# Patient Record
Sex: MALE | Race: WHITE | NOT HISPANIC OR LATINO | Employment: OTHER | URBAN - METROPOLITAN AREA
[De-identification: names, ages, dates, MRNs, and addresses within clinical notes are randomized per-mention and may not be internally consistent; named-entity substitution may affect disease eponyms.]

---

## 2023-02-15 PROBLEM — N17.9 AKI (ACUTE KIDNEY INJURY) (CMS-HCC): Status: ACTIVE | Noted: 2023-02-15

## 2023-02-15 PROBLEM — M25.569 KNEE PAIN: Status: ACTIVE | Noted: 2023-02-15

## 2023-02-15 PROBLEM — E66.01 OBESITY, MORBID, BMI 40.0-49.9 (MULTI): Status: ACTIVE | Noted: 2023-02-15

## 2023-02-15 PROBLEM — G62.9 NEUROPATHY: Status: ACTIVE | Noted: 2023-02-15

## 2023-02-15 PROBLEM — R06.02 SOB (SHORTNESS OF BREATH) ON EXERTION: Status: ACTIVE | Noted: 2023-02-15

## 2023-02-15 PROBLEM — R09.82 POSTNASAL DRIP: Status: ACTIVE | Noted: 2023-02-15

## 2023-02-15 PROBLEM — S96.911A STRAIN OF FOOT, RIGHT: Status: ACTIVE | Noted: 2023-02-15

## 2023-02-15 PROBLEM — N40.0 BENIGN PROSTATIC HYPERPLASIA: Status: ACTIVE | Noted: 2023-02-15

## 2023-02-15 PROBLEM — G47.33 OBSTRUCTIVE SLEEP APNEA SYNDROME: Status: ACTIVE | Noted: 2023-02-15

## 2023-02-15 PROBLEM — G43.909 HEADACHE, MIGRAINE: Status: ACTIVE | Noted: 2023-02-15

## 2023-02-15 PROBLEM — M47.819 ARTHRITIS OF SPINE: Status: ACTIVE | Noted: 2023-02-15

## 2023-02-15 PROBLEM — N18.30 CKD (CHRONIC KIDNEY DISEASE), STAGE III (MULTI): Status: ACTIVE | Noted: 2023-02-15

## 2023-02-15 PROBLEM — R53.83 FATIGUE: Status: ACTIVE | Noted: 2023-02-15

## 2023-02-15 PROBLEM — I25.10 CAD (CORONARY ARTERY DISEASE): Status: ACTIVE | Noted: 2023-02-15

## 2023-02-15 PROBLEM — S99.921A FOOT INJURY, RIGHT, INITIAL ENCOUNTER: Status: ACTIVE | Noted: 2023-02-15

## 2023-02-15 PROBLEM — R20.2 NUMBNESS AND TINGLING IN BOTH HANDS: Status: ACTIVE | Noted: 2023-02-15

## 2023-02-15 PROBLEM — E55.9 VITAMIN D DEFICIENCY: Status: ACTIVE | Noted: 2023-02-15

## 2023-02-15 PROBLEM — E78.5 DYSLIPIDEMIA: Status: ACTIVE | Noted: 2023-02-15

## 2023-02-15 PROBLEM — R60.0 LEG EDEMA: Status: ACTIVE | Noted: 2023-02-15

## 2023-02-15 PROBLEM — E11.9 TYPE 2 DIABETES MELLITUS (MULTI): Status: ACTIVE | Noted: 2023-02-15

## 2023-02-15 PROBLEM — R53.83 MALAISE AND FATIGUE: Status: ACTIVE | Noted: 2023-02-15

## 2023-02-15 PROBLEM — M48.00 SPINAL STENOSIS: Status: ACTIVE | Noted: 2023-02-15

## 2023-02-15 PROBLEM — U07.1 COVID-19: Status: ACTIVE | Noted: 2023-02-15

## 2023-02-15 PROBLEM — R53.81 MALAISE AND FATIGUE: Status: ACTIVE | Noted: 2023-02-15

## 2023-02-15 PROBLEM — M79.89 SWELLING OF LOWER EXTREMITY: Status: ACTIVE | Noted: 2023-02-15

## 2023-02-15 PROBLEM — R07.89 CHEST PAIN, ATYPICAL: Status: ACTIVE | Noted: 2023-02-15

## 2023-02-15 PROBLEM — I10 ESSENTIAL HYPERTENSION: Status: ACTIVE | Noted: 2023-02-15

## 2023-02-15 PROBLEM — R20.0 HAND NUMBNESS: Status: ACTIVE | Noted: 2023-02-15

## 2023-02-15 PROBLEM — R20.0 NUMBNESS OF BOTH LOWER EXTREMITIES: Status: ACTIVE | Noted: 2023-02-15

## 2023-02-15 PROBLEM — R06.83 SNORES: Status: ACTIVE | Noted: 2023-02-15

## 2023-02-15 PROBLEM — K21.9 GERD (GASTROESOPHAGEAL REFLUX DISEASE): Status: ACTIVE | Noted: 2023-02-15

## 2023-02-15 PROBLEM — E11.40 DIABETIC NEUROPATHY (MULTI): Status: ACTIVE | Noted: 2023-02-15

## 2023-02-15 PROBLEM — I25.10 CORONARY ARTERY CALCIFICATION SEEN ON CAT SCAN: Status: ACTIVE | Noted: 2023-02-15

## 2023-02-15 PROBLEM — M17.10 KNEE ARTHROPATHY: Status: ACTIVE | Noted: 2023-02-15

## 2023-02-15 PROBLEM — R20.0 NUMBNESS AND TINGLING IN BOTH HANDS: Status: ACTIVE | Noted: 2023-02-15

## 2023-02-15 PROBLEM — M54.50 LOW BACK PAIN: Status: ACTIVE | Noted: 2023-02-15

## 2023-02-15 PROBLEM — E03.9 HYPOTHYROIDISM: Status: ACTIVE | Noted: 2023-02-15

## 2023-02-15 PROBLEM — R32 URINE INCONTINENCE: Status: ACTIVE | Noted: 2023-02-15

## 2023-02-15 PROBLEM — R91.8 MULTIPLE LUNG NODULES: Status: ACTIVE | Noted: 2023-02-15

## 2023-02-15 PROBLEM — R20.2 PARESTHESIA: Status: ACTIVE | Noted: 2023-02-15

## 2023-02-15 RX ORDER — ASCORBIC ACID 500 MG
500 TABLET ORAL DAILY
COMMUNITY
Start: 2022-02-23 | End: 2023-10-19 | Stop reason: ALTCHOICE

## 2023-02-15 RX ORDER — LANOLIN ALCOHOL/MO/W.PET/CERES
CREAM (GRAM) TOPICAL
COMMUNITY
End: 2023-10-19 | Stop reason: ALTCHOICE

## 2023-02-15 RX ORDER — PIOGLITAZONEHYDROCHLORIDE 45 MG/1
1 TABLET ORAL DAILY
COMMUNITY
Start: 2013-01-18 | End: 2023-03-09 | Stop reason: SDUPTHER

## 2023-02-15 RX ORDER — ASPIRIN 81 MG/1
1 TABLET ORAL DAILY
COMMUNITY

## 2023-02-15 RX ORDER — ATORVASTATIN CALCIUM 40 MG/1
1 TABLET, FILM COATED ORAL DAILY
COMMUNITY
Start: 2020-12-11 | End: 2023-10-02 | Stop reason: SDUPTHER

## 2023-02-15 RX ORDER — SAXAGLIPTIN AND METFORMIN HYDROCHLORIDE 5; 1000 MG/1; MG/1
1 TABLET, FILM COATED, EXTENDED RELEASE ORAL DAILY
COMMUNITY
Start: 2015-09-14 | End: 2023-03-30 | Stop reason: SDUPTHER

## 2023-02-15 RX ORDER — BLOOD SUGAR DIAGNOSTIC
STRIP MISCELLANEOUS 2 TIMES DAILY
COMMUNITY
Start: 2015-12-21 | End: 2023-09-12

## 2023-02-15 RX ORDER — LORATADINE 10 MG/1
1 TABLET ORAL DAILY
COMMUNITY
Start: 2021-12-31

## 2023-02-15 RX ORDER — LEVOTHYROXINE SODIUM 25 UG/1
1 TABLET ORAL DAILY
COMMUNITY
Start: 2012-10-25 | End: 2023-05-31 | Stop reason: SDUPTHER

## 2023-02-15 RX ORDER — HYDROCHLOROTHIAZIDE 25 MG/1
1 TABLET ORAL DAILY
COMMUNITY
Start: 2019-07-09 | End: 2024-04-22

## 2023-02-15 RX ORDER — TAMSULOSIN HYDROCHLORIDE 0.4 MG/1
1 CAPSULE ORAL DAILY
COMMUNITY
Start: 2015-09-10 | End: 2023-10-19 | Stop reason: ALTCHOICE

## 2023-02-15 RX ORDER — ESOMEPRAZOLE MAGNESIUM 20 MG/1
1 TABLET, DELAYED RELEASE ORAL DAILY
COMMUNITY
End: 2023-12-13 | Stop reason: WASHOUT

## 2023-02-15 RX ORDER — FLUTICASONE PROPIONATE 50 MCG
1 SPRAY, SUSPENSION (ML) NASAL DAILY
COMMUNITY
Start: 2017-06-20 | End: 2024-01-10 | Stop reason: SDUPTHER

## 2023-02-15 RX ORDER — TOPIRAMATE 100 MG/1
1 TABLET, FILM COATED ORAL 2 TIMES DAILY
COMMUNITY
Start: 2016-01-26 | End: 2023-12-01

## 2023-02-15 RX ORDER — GABAPENTIN 300 MG/1
300 CAPSULE ORAL
COMMUNITY
Start: 2016-07-28 | End: 2023-04-17 | Stop reason: SDUPTHER

## 2023-02-15 RX ORDER — OLMESARTAN MEDOXOMIL 40 MG/1
20 TABLET ORAL DAILY
COMMUNITY
Start: 2015-11-20 | End: 2023-03-30 | Stop reason: SDUPTHER

## 2023-02-15 RX ORDER — GLIMEPIRIDE 2 MG/1
1 TABLET ORAL
COMMUNITY
Start: 2012-10-08

## 2023-03-09 DIAGNOSIS — E11.9 TYPE 2 DIABETES MELLITUS WITHOUT COMPLICATION, WITHOUT LONG-TERM CURRENT USE OF INSULIN (MULTI): Primary | ICD-10-CM

## 2023-03-13 ENCOUNTER — OFFICE VISIT (OUTPATIENT)
Dept: PRIMARY CARE | Facility: CLINIC | Age: 73
End: 2023-03-13
Payer: MEDICARE

## 2023-03-13 VITALS
HEIGHT: 68 IN | RESPIRATION RATE: 20 BRPM | OXYGEN SATURATION: 96 % | TEMPERATURE: 96.5 F | BODY MASS INDEX: 42.44 KG/M2 | DIASTOLIC BLOOD PRESSURE: 62 MMHG | WEIGHT: 280 LBS | SYSTOLIC BLOOD PRESSURE: 136 MMHG | HEART RATE: 75 BPM

## 2023-03-13 DIAGNOSIS — S49.91XA: ICD-10-CM

## 2023-03-13 DIAGNOSIS — M62.10: Primary | ICD-10-CM

## 2023-03-13 PROCEDURE — 3078F DIAST BP <80 MM HG: CPT | Performed by: NURSE PRACTITIONER

## 2023-03-13 PROCEDURE — 1159F MED LIST DOCD IN RCRD: CPT | Performed by: NURSE PRACTITIONER

## 2023-03-13 PROCEDURE — 4010F ACE/ARB THERAPY RXD/TAKEN: CPT | Performed by: NURSE PRACTITIONER

## 2023-03-13 PROCEDURE — 99214 OFFICE O/P EST MOD 30 MIN: CPT | Performed by: NURSE PRACTITIONER

## 2023-03-13 PROCEDURE — 1036F TOBACCO NON-USER: CPT | Performed by: NURSE PRACTITIONER

## 2023-03-13 PROCEDURE — 3075F SYST BP GE 130 - 139MM HG: CPT | Performed by: NURSE PRACTITIONER

## 2023-03-13 RX ORDER — LANCETS 30 GAUGE
EACH MISCELLANEOUS
COMMUNITY
Start: 2023-01-09

## 2023-03-13 RX ORDER — LANCETS 33 GAUGE
EACH MISCELLANEOUS
COMMUNITY
Start: 2023-01-13 | End: 2023-07-28

## 2023-03-13 RX ORDER — METOPROLOL SUCCINATE 25 MG/1
1 TABLET, EXTENDED RELEASE ORAL DAILY
COMMUNITY
Start: 2023-03-08 | End: 2023-12-13 | Stop reason: SDUPTHER

## 2023-03-13 RX ORDER — MULTIVITAMIN/IRON/FOLIC ACID 18MG-0.4MG
1 TABLET ORAL DAILY
COMMUNITY
Start: 2023-03-08

## 2023-03-13 RX ORDER — RIZATRIPTAN BENZOATE 10 MG/1
TABLET ORAL
COMMUNITY
End: 2023-10-19 | Stop reason: ALTCHOICE

## 2023-03-13 ASSESSMENT — PAIN SCALES - GENERAL: PAINLEVEL: 4

## 2023-03-13 ASSESSMENT — ENCOUNTER SYMPTOMS: JOINT SWELLING: 0

## 2023-03-13 NOTE — PATIENT INSTRUCTIONS
You have  been referred to Orthopaedics for further evaluation and treatment of your ruptured muscle  Rest, Ice pack if needed.  Tylenol if needed for pain.

## 2023-03-13 NOTE — PROGRESS NOTES
"Subjective   Patient ID: Jeffry Lynn is a 72 y.o. male who presents for pulled muscle.    HPI     Patient presents to clinic for evaluation of pulled muscle to right upper arm x1 week.  Patient states he was lifting luggage approximately 50 pounds when he felt a pop in his right upper arm.  He states that blood  accumulated under the arm and he continues to have a point of tenderness. He is able to move his arm without difficulty.  Today he denies pain in the arm and swelling in his arm.      Review of Systems   Musculoskeletal:  Negative for joint swelling.        He denies numbness tingling pain coolness or inability to move the arm.   All other systems reviewed and are negative.      Objective   /62   Pulse 75   Temp 35.8 °C (96.5 °F) (Temporal)   Resp 20   Ht 1.727 m (5' 8\")   Wt 127 kg (280 lb)   SpO2 96%   BMI 42.57 kg/m²     Physical Exam  Constitutional:       Appearance: Normal appearance.   Cardiovascular:      Rate and Rhythm: Normal rate and regular rhythm.   Pulmonary:      Effort: Pulmonary effort is normal.      Breath sounds: Normal breath sounds.   Musculoskeletal:      Comments: Soft tissue bleeding noted to right biceps brachialis around to posterior aspect of right upper arm.  Right arm not warm to touch, no coolness noted, able to move right arm without difficulty, neurovascular intact.   Neurological:      Mental Status: He is alert and oriented to person, place, and time.         Assessment/Plan   Problem List Items Addressed This Visit    None  Visit Diagnoses       Rupture of muscle, nontraumatic    -  Primary    Relevant Orders    Referral to Orthopaedic Surgery-Appointment made for 3/14/2023    Soft tissue injury of right upper arm, initial encounter            Rest, Ice, Elevate.       Tylenol if needed.          "

## 2023-03-15 RX ORDER — PIOGLITAZONEHYDROCHLORIDE 45 MG/1
45 TABLET ORAL DAILY
Qty: 30 TABLET | Refills: 0 | Status: SHIPPED | OUTPATIENT
Start: 2023-03-15 | End: 2023-03-20 | Stop reason: SDUPTHER

## 2023-03-15 NOTE — TELEPHONE ENCOUNTER
Rx Refill Request Telephone Encounter    Name:  Jeffry Lynn  :  [id number]  Medication Name:  Pioglitazone  Dose : 45mg  Route : Oral  Frequency : 1 Daily      Specific Pharmacy location:  [facility]  Date of last appointment:  23  Date of next appointment:  23  Best number to reach patient:  [phone]

## 2023-03-20 RX ORDER — PIOGLITAZONEHYDROCHLORIDE 45 MG/1
45 TABLET ORAL DAILY
Qty: 30 TABLET | Refills: 0 | Status: SHIPPED | OUTPATIENT
Start: 2023-03-20 | End: 2023-03-28

## 2023-03-20 NOTE — TELEPHONE ENCOUNTER
Rx Refill Request Telephone Encounter    Name:  Jeffry Lynn  :  [id number]  Medication Name:  KOMBIGLYZE XR 5-1000MG ORAL TABLETS EXTENDED RELEASE 24HR    Specific Pharmacy location:  [facility] [place] [facility]    PATIENT STATES HE ONLY HAVE ABOUT 7 PILLS LEFT

## 2023-03-22 DIAGNOSIS — E11.9 TYPE 2 DIABETES MELLITUS WITHOUT COMPLICATION, WITHOUT LONG-TERM CURRENT USE OF INSULIN (MULTI): ICD-10-CM

## 2023-03-28 RX ORDER — PIOGLITAZONEHYDROCHLORIDE 45 MG/1
TABLET ORAL
Qty: 30 TABLET | Refills: 11 | Status: SHIPPED | OUTPATIENT
Start: 2023-03-28 | End: 2024-01-30

## 2023-04-05 ENCOUNTER — APPOINTMENT (OUTPATIENT)
Dept: PRIMARY CARE | Facility: CLINIC | Age: 73
End: 2023-04-05
Payer: MEDICARE

## 2023-04-17 ENCOUNTER — OFFICE VISIT (OUTPATIENT)
Dept: PRIMARY CARE | Facility: CLINIC | Age: 73
End: 2023-04-17
Payer: MEDICARE

## 2023-04-17 VITALS
HEIGHT: 68 IN | BODY MASS INDEX: 42.31 KG/M2 | SYSTOLIC BLOOD PRESSURE: 118 MMHG | TEMPERATURE: 97.2 F | WEIGHT: 279.2 LBS | HEART RATE: 70 BPM | DIASTOLIC BLOOD PRESSURE: 52 MMHG | OXYGEN SATURATION: 96 % | RESPIRATION RATE: 12 BRPM

## 2023-04-17 DIAGNOSIS — E11.9 TYPE 2 DIABETES MELLITUS WITHOUT COMPLICATION, WITHOUT LONG-TERM CURRENT USE OF INSULIN (MULTI): Primary | ICD-10-CM

## 2023-04-17 DIAGNOSIS — E11.49 OTHER DIABETIC NEUROLOGICAL COMPLICATION ASSOCIATED WITH TYPE 2 DIABETES MELLITUS (MULTI): ICD-10-CM

## 2023-04-17 DIAGNOSIS — M54.50 ACUTE BILATERAL LOW BACK PAIN WITHOUT SCIATICA: ICD-10-CM

## 2023-04-17 LAB
POC FINGERSTICK BLOOD GLUCOSE: 152 MG/DL (ref 70–100)
POC HEMOGLOBIN A1C: 6.9 % (ref 4.2–6.5)

## 2023-04-17 PROCEDURE — 82962 GLUCOSE BLOOD TEST: CPT | Performed by: NURSE PRACTITIONER

## 2023-04-17 PROCEDURE — 99214 OFFICE O/P EST MOD 30 MIN: CPT | Performed by: NURSE PRACTITIONER

## 2023-04-17 PROCEDURE — 1036F TOBACCO NON-USER: CPT | Performed by: NURSE PRACTITIONER

## 2023-04-17 PROCEDURE — 3078F DIAST BP <80 MM HG: CPT | Performed by: NURSE PRACTITIONER

## 2023-04-17 PROCEDURE — 83036 HEMOGLOBIN GLYCOSYLATED A1C: CPT | Performed by: NURSE PRACTITIONER

## 2023-04-17 PROCEDURE — 4010F ACE/ARB THERAPY RXD/TAKEN: CPT | Performed by: NURSE PRACTITIONER

## 2023-04-17 PROCEDURE — 3074F SYST BP LT 130 MM HG: CPT | Performed by: NURSE PRACTITIONER

## 2023-04-17 PROCEDURE — 1159F MED LIST DOCD IN RCRD: CPT | Performed by: NURSE PRACTITIONER

## 2023-04-17 RX ORDER — GABAPENTIN 300 MG/1
CAPSULE ORAL
Qty: 360 CAPSULE | Refills: 1 | Status: SHIPPED | OUTPATIENT
Start: 2023-04-17 | End: 2023-10-09

## 2023-04-17 ASSESSMENT — PATIENT HEALTH QUESTIONNAIRE - PHQ9
2. FEELING DOWN, DEPRESSED OR HOPELESS: NOT AT ALL
SUM OF ALL RESPONSES TO PHQ9 QUESTIONS 1 AND 2: 0
1. LITTLE INTEREST OR PLEASURE IN DOING THINGS: NOT AT ALL

## 2023-04-17 ASSESSMENT — PAIN SCALES - GENERAL: PAINLEVEL: 4

## 2023-04-17 NOTE — PROGRESS NOTES
"Subjective   Patient ID: Jeffry Lynn is a 72 y.o. male who presents for Follow-up (3 mo follow up).    HPI     Patient presents to clinic for diabetes check.  Patient presented to clinic with his meter has been checking his blood sugars at home meter average is 128.  Although patient states he has concerns if the meter is recording correctly.  He states he had a blood sugar of 66 this morning.  He states he had breakfast after reading of 66.    We tested his One Touch meter and compare it to ours his numbers stayed at 91 hours at 152.      Generally has been feeling well-states he did a lot of outside work around the house has a sore back otherwise patient without any other specific complaints or concerns.      Appetite normal bowel and bladder normal.      Has upcoming appointment May 5, 2023 with Nephrologist outside of  for further evaluation of  and Creatinine 1.56       ROS:  Low Back pain denies urinary symptoms all other systems negative.        Objective   /52 (BP Location: Left arm, Patient Position: Sitting, BP Cuff Size: Large adult)   Pulse 70   Temp 36.2 °C (97.2 °F) (Temporal)   Resp 12   Ht 1.727 m (5' 8\")   Wt 127 kg (279 lb 3.2 oz)   SpO2 96%   BMI 42.45 kg/m²     Physical Exam  Constitutional:       Appearance: Normal appearance. He is not ill-appearing.   HENT:      Mouth/Throat:      Mouth: Mucous membranes are moist.   Cardiovascular:      Rate and Rhythm: Normal rate and regular rhythm.   Pulmonary:      Effort: Pulmonary effort is normal.      Breath sounds: Normal breath sounds.   Musculoskeletal:         General: Normal range of motion.      Comments: Point tenderness low back both sides-Negative spinal tenderness   Skin:     General: Skin is warm and dry.   Neurological:      Mental Status: He is alert and oriented to person, place, and time.         Assessment/Plan   Problem List Items Addressed This Visit          Nervous    Diabetic neuropathy (CMS/HCC)       " Endocrine/Metabolic    Type 2 diabetes mellitus (CMS/HCC) - Primary    Relevant Medications    gabapentin (Neurontin) 300 mg capsule    Other Relevant Orders    POCT glycosylated hemoglobin (Hb A1C) manually resulted (Completed)    POCT Fingerstick Glucose manually resulted (Completed)  A1c 6.9         Other    Low back pain    Tylenol if needed    Warm or heat pack as needed.

## 2023-04-17 NOTE — PATIENT INSTRUCTIONS
Continue medications as prescribed.  Healthy diet and drink plenty of water.  A1c 6.9 today.  Your Glucose meter is reading inaccurate. Please return it back to pharmacy for replacement.   Follow-up in 2 months for Physical Exam  Call office any new concerns.

## 2023-05-31 DIAGNOSIS — E03.9 HYPOTHYROIDISM, UNSPECIFIED TYPE: Primary | ICD-10-CM

## 2023-06-01 RX ORDER — LEVOTHYROXINE SODIUM 25 UG/1
25 TABLET ORAL DAILY
Qty: 90 TABLET | Refills: 1 | Status: SHIPPED | OUTPATIENT
Start: 2023-06-01 | End: 2023-10-30

## 2023-08-09 ENCOUNTER — APPOINTMENT (OUTPATIENT)
Dept: PRIMARY CARE | Facility: CLINIC | Age: 73
End: 2023-08-09
Payer: MEDICARE

## 2023-08-09 DIAGNOSIS — E11.69 TYPE 2 DIABETES MELLITUS WITH OTHER SPECIFIED COMPLICATION, WITHOUT LONG-TERM CURRENT USE OF INSULIN (MULTI): ICD-10-CM

## 2023-08-09 RX ORDER — SAXAGLIPTIN AND METFORMIN HYDROCHLORIDE 5; 1000 MG/1; MG/1
1 TABLET, FILM COATED, EXTENDED RELEASE ORAL DAILY
Qty: 90 TABLET | Refills: 0 | Status: SHIPPED | OUTPATIENT
Start: 2023-08-09 | End: 2023-10-19 | Stop reason: ALTCHOICE

## 2023-08-10 ENCOUNTER — OFFICE VISIT (OUTPATIENT)
Dept: PRIMARY CARE | Facility: CLINIC | Age: 73
End: 2023-08-10
Payer: MEDICARE

## 2023-08-10 VITALS
RESPIRATION RATE: 17 BRPM | OXYGEN SATURATION: 96 % | HEART RATE: 85 BPM | BODY MASS INDEX: 42.97 KG/M2 | WEIGHT: 282.6 LBS | DIASTOLIC BLOOD PRESSURE: 70 MMHG | SYSTOLIC BLOOD PRESSURE: 140 MMHG | TEMPERATURE: 97.6 F

## 2023-08-10 DIAGNOSIS — M54.50 RIGHT-SIDED LOW BACK PAIN WITHOUT SCIATICA, UNSPECIFIED CHRONICITY: ICD-10-CM

## 2023-08-10 DIAGNOSIS — E11.9 TYPE 2 DIABETES MELLITUS WITHOUT COMPLICATION, WITHOUT LONG-TERM CURRENT USE OF INSULIN (MULTI): Primary | ICD-10-CM

## 2023-08-10 LAB — POC HEMOGLOBIN A1C: 6.6 % (ref 4.2–6.5)

## 2023-08-10 PROCEDURE — 1125F AMNT PAIN NOTED PAIN PRSNT: CPT | Performed by: NURSE PRACTITIONER

## 2023-08-10 PROCEDURE — 3078F DIAST BP <80 MM HG: CPT | Performed by: NURSE PRACTITIONER

## 2023-08-10 PROCEDURE — 99214 OFFICE O/P EST MOD 30 MIN: CPT | Performed by: NURSE PRACTITIONER

## 2023-08-10 PROCEDURE — 1036F TOBACCO NON-USER: CPT | Performed by: NURSE PRACTITIONER

## 2023-08-10 PROCEDURE — 1159F MED LIST DOCD IN RCRD: CPT | Performed by: NURSE PRACTITIONER

## 2023-08-10 PROCEDURE — 4010F ACE/ARB THERAPY RXD/TAKEN: CPT | Performed by: NURSE PRACTITIONER

## 2023-08-10 PROCEDURE — 1160F RVW MEDS BY RX/DR IN RCRD: CPT | Performed by: NURSE PRACTITIONER

## 2023-08-10 PROCEDURE — 3077F SYST BP >= 140 MM HG: CPT | Performed by: NURSE PRACTITIONER

## 2023-08-10 PROCEDURE — 83036 HEMOGLOBIN GLYCOSYLATED A1C: CPT | Performed by: NURSE PRACTITIONER

## 2023-08-10 RX ORDER — LIDOCAINE 50 MG/G
1 PATCH TOPICAL DAILY
Qty: 30 PATCH | Refills: 0 | Status: SHIPPED | OUTPATIENT
Start: 2023-08-10 | End: 2024-04-17 | Stop reason: WASHOUT

## 2023-08-10 ASSESSMENT — ENCOUNTER SYMPTOMS
NUMBNESS: 0
POLYDIPSIA: 0
DYSURIA: 0
BACK PAIN: 1
WEAKNESS: 0
POLYPHAGIA: 0
FREQUENCY: 0

## 2023-08-10 ASSESSMENT — PAIN SCALES - GENERAL: PAINLEVEL: 5

## 2023-08-10 NOTE — PATIENT INSTRUCTIONS
Hemoglobin A1c has improved not  6.6  Continue healthy diet.  Please have Xray of back will refer a spine specialist as needed.  Ice or heat to back.  Follow-up in 3 months.

## 2023-08-10 NOTE — PROGRESS NOTES
Subjective   Patient ID: Jeffry Lynn is a 72 y.o. male who presents for Back Pain (Lower right side. Reports ongoing for the last 3 weeks. Reports pain went away, then lifted a rock at home for yardwork and hurt back further when bending down to sit rock on ground. Tylenol and icing- reports minimal relief. )     Back Pain  Pertinent negatives include no dysuria, numbness or weakness.      Patient presents to clinic for diabetes check.  Patient checks his blood sugars daily blood   Blood sugar ranges 90-120s.  Appetite normal bowel and bladder normal.    Patient tells me he had a recent visit with his cardiologist who referred him to see a nephrologist because he had elevation in his kidney function test.  Nephrologist advised him to be seen by endocrinology.  Patient states he has an upcoming appointment with endocrinologist Dr. Antoine September 20, 2023.    Today patient also complains of right sided lower back pain after lifting a 15 to 20 pound rock.  He denies urinary symptoms, numbness or tingling in his legs or toes.  Patient has a history of spinal surgery approximately 9 years ago he had L4 and L5 fused.    Both bowel and bladder normal.      Review of Systems   Endocrine: Negative for polydipsia, polyphagia and polyuria.   Genitourinary:  Negative for dysuria, frequency and urgency.   Musculoskeletal:  Positive for back pain.   Neurological:  Negative for weakness and numbness.       Objective   /70 (BP Location: Left arm, Patient Position: Sitting, BP Cuff Size: Large adult)   Pulse 85   Temp 36.4 °C (97.6 °F) (Temporal)   Resp 17   Wt 128 kg (282 lb 9.6 oz)   SpO2 96%   BMI 42.97 kg/m²     Physical Exam  Vitals reviewed.   Constitutional:       Appearance: Normal appearance.   Cardiovascular:      Rate and Rhythm: Normal rate and regular rhythm.   Pulmonary:      Effort: Pulmonary effort is normal.      Breath sounds: Normal breath sounds.   Abdominal:      Tenderness: There is no right  CVA tenderness or left CVA tenderness.   Musculoskeletal:      Comments: Tenderness right side or back.   Skin:     General: Skin is warm and dry.   Neurological:      Mental Status: He is alert and oriented to person, place, and time.         Assessment/Plan   Problem List Items Addressed This Visit       Low back pain    Relevant Medications    lidocaine (Lidoderm) 5 % patch    Other Relevant Orders    XR lumbar spine complete 4+ views    Referral to Pain Medicine  Ice or heat to the back as needed.  Pain management referral submitted.    Type 2 diabetes mellitus (CMS/[facility]) - Primary    Relevant Orders    POCT glycosylated hemoglobin (Hb A1C) manually resulted (Completed) A1c 6.6 today trending downward from previous reading of 6.93 months ago.

## 2023-08-11 NOTE — TELEPHONE ENCOUNTER
Result Communication    Resulted Orders   XR lumbar spine complete 4+ views    Narrative    Interpreted By:  MP VARELA DO  STUDY:  Lumbar Spine Radiographs; 8/10/23 at 3:00 PM     INDICATION:  Right low back pain.  Prior surgery 2014.     COMPARISON:  None available.     ACCESSION NUMBER(S):  [id number]     ORDERING CLINICIAN:  NERI DEL TORO CNP     TECHNIQUE:  Five view(s) of the lumbar spine.     FINDINGS:    There is minimal leftward convexity of the lumbar spine.  This  preservation of lordosis.  There is pedicle screw and sher fixation  L4-5 with intact hardware.  There is intervertebral body spacer device  which appears slightly posterior displaced with 8 mm anterolisthesis  L4 on L5.  There is disc space narrowing and endplate degenerative  changes.  Oblique images demonstrate no spondylolysis.  There are  atherosclerotic changes of the abdominal aorta.  Sacroiliac joints are  unremarkable.       Impression    Post surgical changes at L4-5 with intact hardware.  Degenerative  changes as described.  No acute fracture or malalignment.                 Signed by Mp Varela DO       2:33 PM      Results were successfully communicated with the patient and they acknowledged their understanding.

## 2023-10-02 DIAGNOSIS — E78.5 DYSLIPIDEMIA: ICD-10-CM

## 2023-10-03 RX ORDER — ATORVASTATIN CALCIUM 40 MG/1
40 TABLET, FILM COATED ORAL DAILY
Qty: 90 TABLET | Refills: 0 | Status: SHIPPED | OUTPATIENT
Start: 2023-10-03 | End: 2023-12-15 | Stop reason: SDUPTHER

## 2023-10-08 DIAGNOSIS — E11.9 TYPE 2 DIABETES MELLITUS WITHOUT COMPLICATION, WITHOUT LONG-TERM CURRENT USE OF INSULIN (MULTI): ICD-10-CM

## 2023-10-09 RX ORDER — GABAPENTIN 300 MG/1
CAPSULE ORAL
Qty: 360 CAPSULE | Refills: 1 | Status: SHIPPED | OUTPATIENT
Start: 2023-10-09 | End: 2024-02-21 | Stop reason: SDUPTHER

## 2023-10-19 ENCOUNTER — OFFICE VISIT (OUTPATIENT)
Dept: PRIMARY CARE | Facility: CLINIC | Age: 73
End: 2023-10-19
Payer: MEDICARE

## 2023-10-19 VITALS
WEIGHT: 279 LBS | DIASTOLIC BLOOD PRESSURE: 80 MMHG | HEART RATE: 84 BPM | HEIGHT: 68 IN | BODY MASS INDEX: 42.28 KG/M2 | SYSTOLIC BLOOD PRESSURE: 132 MMHG | OXYGEN SATURATION: 96 % | TEMPERATURE: 97.9 F

## 2023-10-19 DIAGNOSIS — J01.11 ACUTE RECURRENT FRONTAL SINUSITIS: Primary | ICD-10-CM

## 2023-10-19 DIAGNOSIS — Z00.00 PHYSICAL EXAM: ICD-10-CM

## 2023-10-19 PROCEDURE — G0008 ADMIN INFLUENZA VIRUS VAC: HCPCS | Performed by: NURSE PRACTITIONER

## 2023-10-19 PROCEDURE — 1125F AMNT PAIN NOTED PAIN PRSNT: CPT | Performed by: NURSE PRACTITIONER

## 2023-10-19 PROCEDURE — 1036F TOBACCO NON-USER: CPT | Performed by: NURSE PRACTITIONER

## 2023-10-19 PROCEDURE — 99397 PER PM REEVAL EST PAT 65+ YR: CPT | Performed by: NURSE PRACTITIONER

## 2023-10-19 PROCEDURE — 3075F SYST BP GE 130 - 139MM HG: CPT | Performed by: NURSE PRACTITIONER

## 2023-10-19 PROCEDURE — 3079F DIAST BP 80-89 MM HG: CPT | Performed by: NURSE PRACTITIONER

## 2023-10-19 PROCEDURE — 1160F RVW MEDS BY RX/DR IN RCRD: CPT | Performed by: NURSE PRACTITIONER

## 2023-10-19 PROCEDURE — 1159F MED LIST DOCD IN RCRD: CPT | Performed by: NURSE PRACTITIONER

## 2023-10-19 PROCEDURE — 90662 IIV NO PRSV INCREASED AG IM: CPT | Performed by: NURSE PRACTITIONER

## 2023-10-19 RX ORDER — METFORMIN HYDROCHLORIDE 500 MG/1
500 TABLET, EXTENDED RELEASE ORAL
COMMUNITY

## 2023-10-19 RX ORDER — AMOXICILLIN AND CLAVULANATE POTASSIUM 875; 125 MG/1; MG/1
875 TABLET, FILM COATED ORAL 2 TIMES DAILY
Qty: 20 TABLET | Refills: 0 | Status: SHIPPED | OUTPATIENT
Start: 2023-10-19 | End: 2023-10-29

## 2023-10-19 RX ORDER — DULAGLUTIDE 0.75 MG/.5ML
0.75 INJECTION, SOLUTION SUBCUTANEOUS
COMMUNITY

## 2023-10-19 RX ORDER — AMOXICILLIN AND CLAVULANATE POTASSIUM 875; 125 MG/1; MG/1
875 TABLET, FILM COATED ORAL 2 TIMES DAILY
Qty: 20 TABLET | Refills: 0 | Status: SHIPPED | OUTPATIENT
Start: 2023-10-19 | End: 2023-10-19 | Stop reason: SDUPTHER

## 2023-10-19 ASSESSMENT — ENCOUNTER SYMPTOMS
FREQUENCY: 0
VOMITING: 0
DIARRHEA: 0
FLANK PAIN: 0
ABDOMINAL PAIN: 0
POLYPHAGIA: 0
SINUS COMPLAINT: 1
FEVER: 0
POLYDIPSIA: 0
CHILLS: 0
NAUSEA: 0

## 2023-10-19 ASSESSMENT — PATIENT HEALTH QUESTIONNAIRE - PHQ9
2. FEELING DOWN, DEPRESSED OR HOPELESS: NOT AT ALL
1. LITTLE INTEREST OR PLEASURE IN DOING THINGS: NOT AT ALL
SUM OF ALL RESPONSES TO PHQ9 QUESTIONS 1 AND 2: 0

## 2023-10-19 ASSESSMENT — PAIN SCALES - GENERAL: PAINLEVEL: 4

## 2023-10-19 NOTE — PROGRESS NOTES
"Subjective   Patient ID: Jeffry Lynn is a 73 y.o. male who presents for Annual Exam and Sinus Problem.    Sinus Problem  Pertinent negatives include no chills.        Patient presents to clinic for annual visit and evaluation of sinus symptoms x3 weeks    Patient is diabetic being followed by Dr. Antoine last hemoglobin A1c 6.5- He tells me blood sugars run 90-120s at home. He has an upcoming appointment with Dr. Antoine who has also ordered blood work for patient December 19, 2023.  Current weight 279 pounds 3 pound weight loss in the last 3 months.    Today he complains of sinus pressure in the forehead with postnasal drainage x3 weeks.  He states he also had a cough that resolved took a COVID test that was negative.  Denies fevers sore throat loss of taste or smell nausea vomiting or any other symptoms.    Appetite normal bowel and bladder normal.      Preventive screenings:  Colonoscopy 5/4/2022   Shingles vaccine 2019  Influenza vaccine updated today in clinic  COVID-vaccine x2 times booster will receive updated booster at local pharmacy.  Pneumonia vaccine 2018  Tdap-not up-to-date-declines today.      Review of Systems   Constitutional:  Negative for chills and fever.   HENT:          See HPI   Gastrointestinal:  Negative for abdominal pain, diarrhea, nausea and vomiting.   Endocrine: Negative for polydipsia, polyphagia and polyuria.   Genitourinary:  Negative for flank pain, frequency and urgency.   All other systems reviewed and are negative.      Objective   /80 (BP Location: Left arm, Patient Position: Sitting, BP Cuff Size: Large adult)   Pulse 84   Temp 36.6 °C (97.9 °F)   Ht 1.727 m (5' 8\")   Wt 127 kg (279 lb)   SpO2 96%   BMI 42.42 kg/m²     Physical Exam  Vitals reviewed.   Constitutional:       Appearance: Normal appearance. He is obese. He is not toxic-appearing.   HENT:      Right Ear: Tympanic membrane and external ear normal.      Left Ear: Tympanic membrane and external ear " normal.      Nose:      Right Turbinates: Not enlarged, swollen or pale.      Left Turbinates: Not enlarged, swollen or pale.      Right Sinus: Frontal sinus tenderness present.      Left Sinus: Frontal sinus tenderness present.      Mouth/Throat:      Mouth: Mucous membranes are moist.      Pharynx: Oropharynx is clear.   Eyes:      Pupils: Pupils are equal, round, and reactive to light.   Cardiovascular:      Rate and Rhythm: Normal rate and regular rhythm.   Pulmonary:      Effort: Pulmonary effort is normal.      Breath sounds: Normal breath sounds.   Abdominal:      General: Bowel sounds are normal.      Palpations: Abdomen is soft.      Tenderness: There is no abdominal tenderness. There is no guarding or rebound.   Musculoskeletal:         General: Normal range of motion.      Cervical back: Normal range of motion and neck supple.   Skin:     General: Skin is warm and dry.   Neurological:      Mental Status: He is alert and oriented to person, place, and time.   Psychiatric:         Mood and Affect: Mood normal.         Assessment/Plan   Problem List Items Addressed This Visit    None  Visit Diagnoses       Acute recurrent frontal sinusitis    -  Primary    Relevant Medications    amoxicillin-pot clavulanate (Augmentin) 875-125 mg tablet    Physical exam      Continue medications as prescribed  Healthy diet regular exercises as tolerated  Follow-up 6 months sooner if needed  Follow-up with endocrinologist Dr. Lyons for management of diabetes mellitus.

## 2023-10-19 NOTE — PROGRESS NOTES
Patient tolerated vaccine well. No c/o pain, dizziness or lightheadedness. No s/sx of infection noted.

## 2023-10-19 NOTE — PATIENT INSTRUCTIONS
Continue medications as prescribed.  Follow-up with Dr. Antoine as scheduled.  Healthy diet and drink plenty of water.  Taker antibiotic as prescribed until complete. Flonase nasal spray as needed.   Please schedule all necessary health screenings ophthalmology and dentist.    Follow-up in 4 months.  Call office any new concerns.

## 2023-10-29 DIAGNOSIS — E03.9 HYPOTHYROIDISM, UNSPECIFIED TYPE: ICD-10-CM

## 2023-10-30 RX ORDER — LEVOTHYROXINE SODIUM 25 UG/1
25 TABLET ORAL DAILY
Qty: 90 TABLET | Refills: 2 | Status: SHIPPED | OUTPATIENT
Start: 2023-10-30

## 2023-11-14 ENCOUNTER — OFFICE VISIT (OUTPATIENT)
Dept: SLEEP MEDICINE | Facility: CLINIC | Age: 73
End: 2023-11-14
Payer: MEDICARE

## 2023-11-14 VITALS
OXYGEN SATURATION: 97 % | BODY MASS INDEX: 42.04 KG/M2 | HEIGHT: 68 IN | WEIGHT: 277.4 LBS | DIASTOLIC BLOOD PRESSURE: 67 MMHG | TEMPERATURE: 98.4 F | RESPIRATION RATE: 16 BRPM | HEART RATE: 68 BPM | SYSTOLIC BLOOD PRESSURE: 129 MMHG

## 2023-11-14 DIAGNOSIS — G47.33 OBSTRUCTIVE SLEEP APNEA: Primary | ICD-10-CM

## 2023-11-14 PROCEDURE — 3074F SYST BP LT 130 MM HG: CPT | Performed by: NURSE PRACTITIONER

## 2023-11-14 PROCEDURE — 1159F MED LIST DOCD IN RCRD: CPT | Performed by: NURSE PRACTITIONER

## 2023-11-14 PROCEDURE — 99213 OFFICE O/P EST LOW 20 MIN: CPT | Performed by: NURSE PRACTITIONER

## 2023-11-14 PROCEDURE — 3078F DIAST BP <80 MM HG: CPT | Performed by: NURSE PRACTITIONER

## 2023-11-14 PROCEDURE — 1036F TOBACCO NON-USER: CPT | Performed by: NURSE PRACTITIONER

## 2023-11-14 PROCEDURE — 1160F RVW MEDS BY RX/DR IN RCRD: CPT | Performed by: NURSE PRACTITIONER

## 2023-11-14 PROCEDURE — 1126F AMNT PAIN NOTED NONE PRSNT: CPT | Performed by: NURSE PRACTITIONER

## 2023-11-14 ASSESSMENT — ENCOUNTER SYMPTOMS
DEPRESSION: 0
OCCASIONAL FEELINGS OF UNSTEADINESS: 0
LOSS OF SENSATION IN FEET: 1

## 2023-11-14 ASSESSMENT — PATIENT HEALTH QUESTIONNAIRE - PHQ9
SUM OF ALL RESPONSES TO PHQ9 QUESTIONS 1 AND 2: 0
1. LITTLE INTEREST OR PLEASURE IN DOING THINGS: NOT AT ALL
2. FEELING DOWN, DEPRESSED OR HOPELESS: NOT AT ALL

## 2023-11-14 ASSESSMENT — COLUMBIA-SUICIDE SEVERITY RATING SCALE - C-SSRS
1. IN THE PAST MONTH, HAVE YOU WISHED YOU WERE DEAD OR WISHED YOU COULD GO TO SLEEP AND NOT WAKE UP?: NO
6. HAVE YOU EVER DONE ANYTHING, STARTED TO DO ANYTHING, OR PREPARED TO DO ANYTHING TO END YOUR LIFE?: NO
2. HAVE YOU ACTUALLY HAD ANY THOUGHTS OF KILLING YOURSELF?: NO

## 2023-11-14 ASSESSMENT — PAIN SCALES - GENERAL: PAINLEVEL: 0-NO PAIN

## 2023-11-14 NOTE — PROGRESS NOTES
[unfilled] Patient: Jeffry Lynn    [id number]  : 1950 -- AGE 73 y.o.    Provider: BESSY Banks     Location [facility] [employer] [place] [address]   Service Date: 2023              [facility]  Followup Visit Note          HISTORY OF PRESENT ILLNESS     The patient's referring provider is: No ref. provider found    HISTORY OF PRESENT ILLNESS   Jeffry Lynn is a 73 y.o. male who presents to a [facility] for followup.     The patient  has a past medical history of ANSELOM (acute kidney injury) (CMS/[facility]), Apnea, sleep, Arthritis, BPH (benign prostatic hyperplasia), CAD (coronary artery disease), Cholestasis, CKD (chronic kidney disease), stage III (CMS/HCC), Coronary artery calcification seen on CAT scan, Diabetic neuropathy (CMS/HCC), Dyslipidemia, Essential familial hypercholesterolemia, Essential hypertension, GERD (gastroesophageal reflux disease), History of acute prostatitis, History of deep venous thrombosis, History of melena, Hyperesthesia, Hypotension, Hypothyroidism, Knee arthropathy, Left shoulder tendinitis, Lumbar canal stenosis, Migraine with aura, not intractable, without status migrainosus, Multiple lung nodules, Obesity, morbid, BMI 40.0-49.9 (CMS/HCC), Obstructive sleep apnea, Paresthesia, Personal history of gout, Post-nasal drip, Rickets, active, Sialoadenitis of submandibular gland, Snores, SOBOE (shortness of breath on exertion), Spondylolisthesis of lumbar region, Type 2 diabetes mellitus (CMS/HCC), Unilateral primary osteoarthritis, left knee, Urine incontinence, and Vitamin D deficiency..    PAST SLEEP HISTORY    JEFFRY has had a home sleep study in the past completed on 2020. The results were as follows:    AHI: 29 (59 central, 48 Obs, 181 hypopnea)   O2 Handy: 76%    CURRENT HISTORY    On today's visit, the patient reports:  He was last seen by KIRA Jara on 2023 and CPAP was continued at same  "settings.    In the interim:  He states he is doing pretty good  \"Put on weight\" and up to 289.  Cardiologist noted kidney function affected--> was told he was on \"too many meds\"  Was referred to Dr. Antoine--for DM management-->meds adjusted    Has some nausea at night with dry heaves, not every night--?sinuses, PND and will not tolerate the CPAP  With CPAP, gets 4.5-6 hours sleep  Denies c/o with current mask or pressure settings      Sleep Schedule: He goes to bed at 11p and his sleep latency is 5-10 minutes. He wakes by  7a. He has 1 awakenings per night that last for a few  minutes. He gets about 4-6  hours of sleep per night.    PAP Adherence:  DURABLE MEDICAL EQUIPMENT COMPANY: [employer]  Machine: THERAPY: RESMED AIRSENSE 11 PRESSURE SETTINGS: 6 cm H2O - 16 cm H2O  Mask: MASK TYPE: N30i  Issues with therapy: ISSUES WITH THERAPY: Sensation of too much pressure  Benefits with PAP: PERCEIVED BENEFITS OF PAP: refreshing sleep decreased or no snoring better sleep quality decreased morning headaches      RLS Followup:   SPPRLSFUSX: none.      Daytime Symptoms    Patient report some daytime symptoms including: DAYTIME SYMPTOMS: none  Patient denies daytime symptoms including: Denies: denies    Naps:   Yes. Naps ARE/ARENOT: are refreshing.  Fatigue: mild    ESS: 9  KEISHA 6    REVIEW OF SYSTEMS     REVIEW OF SYSTEMS  Review of Systems   All other systems reviewed and are negative.        ALLERGIES AND MEDICATIONS     ALLERGIES  Allergies   Allergen Reactions    Propoxyphene N-Acetaminophen Nausea Only       MEDICATIONS: He has a current medication list which includes the following prescription(s): aspirin - Take 1 tablet (81 mg) by mouth once daily, atorvastatin - Take 1 tablet (40 mg) by mouth once daily, trulicity - Inject 0.75 mg under the skin 1 (one) time per week, esomeprazole magnesium - Take 1 capsule by mouth once daily, fluticasone - Administer 1 spray into affected nostril(s) once daily, " gabapentin - TAKE 2 CAPSULES BY MOUTH IN THE  MORNING AND 2 CAPSULES BY MOUTH  IN THE EVENING TWICE DAILY, glimepiride - Take 1 tablet (2 mg) by mouth once daily in the morning. Take before meals, hydrochlorothiazide - Take 1 tablet (25 mg) by mouth once daily, levothyroxine - TAKE 1 TABLET BY MOUTH ONCE  DAILY, lidocaine - Place 1 patch over 12 hours on the skin once daily. Apply to painful area 12 hours per day, remove for 12 hours, loratadine - Take 1 tablet (10 mg) by mouth once daily, metformin xr - Take 1 tablet (500 mg) by mouth 2 times a day with meals. Do not crush, chew, or split, metoprolol succinate xl - Take 1 tablet (25 mg) by mouth once daily, centrum - Take 1 tablet by mouth once daily, onetouch delica plus lancet - TEST TWICE DAILY AND AS NEEDED, onetouch verio flex meter - use as directed, onetouch verio test strips - TEST TWICE DAILY AS NEEDED, pioglitazone - TAKE 1 TABLET BY MOUTH ONCE  DAILY, and topiramate - Take 1 tablet (100 mg) by mouth 2 times a day.    PAST MEDICAL HISTORY : He  has a past medical history of ANSELMO (acute kidney injury) (CMS/[facility]), Apnea, sleep, Arthritis, BPH (benign prostatic hyperplasia), CAD (coronary artery disease), Cholestasis, CKD (chronic kidney disease), stage III (CMS/HCC), Coronary artery calcification seen on CAT scan, Diabetic neuropathy (CMS/[facility]), Dyslipidemia, Essential familial hypercholesterolemia, Essential hypertension, GERD (gastroesophageal reflux disease), History of acute prostatitis, History of deep venous thrombosis, History of melena, Hyperesthesia, Hypotension, Hypothyroidism, Knee arthropathy, Left shoulder tendinitis, Lumbar canal stenosis, Migraine with aura, not intractable, without status migrainosus, Multiple lung nodules, Obesity, morbid, BMI 40.0-49.9 (CMS/HCC), Obstructive sleep apnea, Paresthesia, Personal history of gout, Post-nasal drip, Rickets, active, Sialoadenitis of submandibular gland, Snores, SOBOE (shortness of breath on  "exertion), Spondylolisthesis of lumbar region, Type 2 diabetes mellitus (CMS/HCC), Unilateral primary osteoarthritis, left knee, Urine incontinence, and Vitamin D deficiency.    PAST SURGICAL HISTORY: He  has a past surgical history that includes Lumbar fusion; Knee surgery; Umbilical hernia repair; Tonsillectomy (1950); Colonoscopy; and Carpal tunnel release.     FAMILY HISTORY: No changes since previous visit. Otherwise non-contributory as charted.       SOCIAL HISTORY  He  reports that he quit smoking about 45 years ago. His smoking use included cigarettes. He has never used smokeless tobacco. He reports current alcohol use. He reports that he does not use drugs.       PHYSICAL EXAM     Vitals:    11/14/23 1016   BP: 129/67   Pulse: 68   Resp: 16   Temp: 36.9 °C (98.4 °F)   SpO2: 97%     CURRENT WEIGHT: [unfilled]  BMI: [unfilled]  PREVIOUS WEIGHTS:  Wt Readings from Last 3 Encounters:   10/19/23 127 kg (279 lb)   08/10/23 128 kg (282 lb 9.6 oz)   06/19/23 129 kg (284 lb 2 oz)       Physical Exam  PHYSICAL EXAM: GENERAL: alert pleasant and cooperative no acute distress  MODIFIED MALLAMPATI SCORE: III (soft and hard palate and base of uvula visible)  CHEST EXAM: breath sounds are clear to auscultation bilaterally without rales, rhonchi, or wheezes  CARDIAC EXAM: Heart sounds are normal.  Regular rate and rhythm without murmur, rubs, or gallop      RESULTS/DATA     No results found for: \"IRON\", \"TRANSFERRIN\", \"IRONSAT\", \"TIBC\", \"FERRITIN\"    PAP Adherence  A PAP adherence download was obtained and data was reviewed personally today in clinic. (see scanned document in EPIC)  COMPLIANCE REPORT RESULTS: Date Range:  10/14-11/12/2023 - 26 Days used: 24 > 4 hours usage:  87 % Average usage hours on dates used: 5  hours Residual AHI 7.7    He shares that he is more comfortable with the wide mask.  He states he will get some air leak--states may occur when he has sinus difficulties or when he needs to change the pillows.  We " reviewed the recommended schedule to change supplies  We also reviewed cleaning CPAP after illness        ASSESSMENT/PLAN     Mr. Lynn is a 73 y.o. male and  has a past medical history of ANSELMO (acute kidney injury) (CMS/[facility]), Apnea, sleep, Arthritis, BPH (benign prostatic hyperplasia), CAD (coronary artery disease), Cholestasis, CKD (chronic kidney disease), stage III (CMS/[facility]), Coronary artery calcification seen on CAT scan, Diabetic neuropathy (CMS/[facility]), Dyslipidemia, Essential familial hypercholesterolemia, Essential hypertension, GERD (gastroesophageal reflux disease), History of acute prostatitis, History of deep venous thrombosis, History of melena, Hyperesthesia, Hypotension, Hypothyroidism, Knee arthropathy, Left shoulder tendinitis, Lumbar canal stenosis, Migraine with aura, not intractable, without status migrainosus, Multiple lung nodules, Obesity, morbid, BMI 40.0-49.9 (CMS/[facility]), Obstructive sleep apnea, Paresthesia, Personal history of gout, Post-nasal drip, Rickets, active, Sialoadenitis of submandibular gland, Snores, SOBOE (shortness of breath on exertion), Spondylolisthesis of lumbar region, Type 2 diabetes mellitus (CMS/[facility]), Unilateral primary osteoarthritis, left knee, Urine incontinence, and Vitamin D deficiency.       LORENA:  Continue at same settings  Continue to monitor rAHI vs leaks  Consider change in mask if leak continues and affects rAHI  Supply order to [facility].  Follow up 1 year or sooner with concerns

## 2023-11-14 NOTE — PATIENT INSTRUCTIONS
[facility] Sleep Medicine  [facility] [address] MEDICAL [facility] 1  [address] [address]       NAME: Jeffry Lynn   DATE: 11/14/2023     Your Sleep Provider Today: BESSY Banks  Your Primary Care Physician: BESSY Campos   Your Referring Provider: No ref. provider found    DIAGNOSIS:   No diagnosis found.    Thank you for coming to the Sleep Medicine Clinic today! Your sleep medicine provider today was: BESSY Banks Below is a summary of your treatment plan, other important information, and our contact numbers:      TREATMENT PLAN     You are doing well with CPAP-  You are averaging about 5 hours of usage for the last 26/30 days  The report demonstrates that you have a mask leak at times.  Despite the leak, you sleep apnea is being managed well.    I will send [employer] an order to update your supplies  ENJOY your HOLIDAYS!    See you next year:     **Please follow up earlier if you have any of the follow symptoms:  -- Snoring on CPAP  -- Stop Breathing on CPAP  --Increased fatigue or Sleepiness  --Issue tolerating the pressure        PAP Therapy Cleaning and Maintenance Recommendations  Reminder   Save an old set of disposable supplies in case the equipment breaks or gets lost.   Daily Care    Wipe the inside and outside of your mask / nasal pillows with a damp cloth.   Empty the water out of your humidifier in the morning.   Open your water reservoir and allow it to dry between uses.   Use distilled water, avoid tap water and never use well water     Weekly Care   Wash your supplies with mild soap and water. Use plain dish soap or baby shampoo.  Disconnect your tubing from the machine, remove the water reservoir and take your mask apart for cleaning. Clean any washable filters. Rinse well.   Do not use any soaps that have additives that make them 'anti-bacterial'.   Wipe dry with a clean cloth and let supplies air  dry for the day, it is recommended that you do this early in the day if you use your machine at night. You can hang your tubing up to dry on the shower sher. If the tubing is still wet in the evening, you can attach it to your PAP machine and run air through it for a few minutes to help remove more water.   You can also soak your water chamber in vinegar solution monthly (1 part white vinegar to 3 parts water for 20 minutes). Rinse well after soaking.   When you reassemble the equipment, check for air leaks.   Seasonal Care (every 3 months)    Inspect your mask seal or nasal pillow inserts. If they are leaking and not providing a good seal, call your home care/DME company for replacement. You should replace these at least every 1 to 3 months.   Wipe your machine off with a damp cloth if it is mine. Avoid use of oils or polishes.   If your machine comes with a white disposable filter, this should be changed at the beginning of each season at a minimum. Call your home care/DME company to order a replacement.   When you have a cold or nasal infection   After a respiratory illness, clean your equipment with soap and water as instructed above. Then soak your equipment in 1 part white vinegar mixed with 3 parts water for 20 minutes (ex: 1 cup vinegar with 3 cups water). Rinse well after soaking.   DO NOT USE BLEACH to clean your equipment, it can irritate your respiratory tract.   If you are hospitalized    If you are hospitalized, take your old back-up set of supplies with you to the hospital, leaving your newer supplies at home.    Ask the hospital if you can use your home PAP machine while you are staying in the hospital. You will be more comfortable using the machine and equipment you are used to.   When you are discharged, throw away the old disposable equipment and return to using your newer equipment at home. Wipe your machine off with soap and water when you bring it home.                   Follow-up Appointment:    No follow-ups on file.      IMPORTANT INFORMATION     Call 911 for medical emergencies.  Our offices are generally open from Monday-Friday, 9 am - 5 pm.  If you need to get in touch with me, you may either call me and my team(number is below) or you can use [email or web address].  If a referral for a test, for CPAP, or for another specialist was made, and you have not heard about scheduling this within a week, please call scheduling at [phone]).  If you are unable to make your appointment for clinic or an overnight study, kindly call the office at least 48 hours in advance to cancel and reschedule.  If you are on CPAP, please bring your device's card or the device to each clinic appointment.   There are no supporting services by either the sleep doctors or their staff on weekends and Holidays, or after 5 PM on weekdays.   If you have been asked to come to a sleep study, make sure you bring toiletries, a comfy pillow, and any nighttime medications that you may regularly take. Also be sure to eat dinner before you arrive. We generally do not provide meals.      PRESCRIPTIONS     We require 7 days advanced notice for prescription refills. If we do not receive the request in this time, we cannot guarantee that your medication will be refilled in time.      IMPORTANT PHONE NUMBERS     Sleep Medicine Clinic Fax: [phone]  Appointments (for Pediatric Sleep Clinic): [phone] - option 1  Appointments (for Adult Sleep Clinic): [phone] - option 2  Appointments (For Sleep Studies): [phone] - option 3  Behavioral Sleep Medicine: [phone]  Sleep Surgery: [phone]  ENT (Otolaryngology): [phone]  Headache Clinic (Neurology): [phone]  Neurology: [phone]  Psychiatry: [phone]  Pulmonary Function Testing (PFT) Center: [phone]  Pulmonary Medicine: [phone]  [employer] (DME): [phone]  [facility] ([place]): [phone]  [facility]  (DME): [phone]      OUR ADULT SLEEP MEDICINE TEAM   Please do not hesitate to call the office or sleep nurse with any questions between appointments:    Adult Sleep Nurses (Gloria Peralta, RN and Chanelle Woodson, RN):  For clinical questions and refilling prescriptions: [phone]  Email sleep diaries and other documents at: [email or web address]    Adult Sleep Medicine Secretaries:  Mariah Steiner (For Ander/Zuniga/Krise/Strohl/Yeh/Hernandez):   P: [phone]  F: [phone]  Susanna Ramirez (For [place]): P: [phone]  Fax: [phone]  Kristin Villegas (For Jurcevic/Blank): P: [phone]  F: [phone]  Patricia Macedo (For [place]): P: [phone]  F: [phone]  Rayna Raya (For Mary/Mary/Zakhary): P: [phone]  F: [phone]  Hina Pimentel (For Carlos/Ace): P: [phone]  F: [phone]     Adult Sleep Medicine Advanced Practice Providers:  Jay Gonzales ([place], [place])  Radha Hernandez ([facility])  Dee Carroll CNP ([place], [place], [place])  Janell Chowdhury CNP ([place], [place], [place])  Priyanka Huerta ([place], [place], [place])  Britta Bello CNP ([facility])        OUR SLEEP TESTING LOCATIONS     Our team will contact you to schedule your sleep study, however, you can contact us as follow:  Main Phone Line (scheduling only): [phone]), option 3  Adult and Pediatric Locations  [facility] (6 years and older): [facility] - 4th floor ([address] [place] [address]) After hours line: [phone]  [facility] (Main campus: All ages): [facility], 6th floor. After hours line: [phone]   [place] (5 years and older; younger considered on case-by-case basis): [address]; [address], [address]. Scheduling  After hours line: [phone]   [place] (6 years and older): [address]; [address]; [address]  [facility] (6  "years and older): [address], [address]  After hours line: [phone]   [place] (13 years and older) in [place]: [address], 2nd floor  After hours line: [phone]   [place] (13 year and older): [address]  After hours line: [phone]     Adult Only Locations:   [place] (18 years and older): [address] [place] [address], 2nd floor   [place] (18 years and older): [address]; 4th floor  After hours line: [phone]  [facility] (18 years and older) at [place]: [id number] [place] [address]  After hours line: [phone]          CONTACTING YOUR SLEEP MEDICINE PROVIDER     Send a message directly to your provider through \"My Chart\", which is the email service through your  Records Account: [email or web address]   Call [phone] and leave a message. One of the administrative assistants will forward the message to your sleep medicine provider through \"My Chart\" and/or email.     Your sleep medicine provider for this visit was: Georgina Hernandez, APRN-CNP      "

## 2023-11-28 DIAGNOSIS — E78.5 DYSLIPIDEMIA: ICD-10-CM

## 2023-11-29 DIAGNOSIS — G43.909 MIGRAINE, UNSPECIFIED, NOT INTRACTABLE, WITHOUT STATUS MIGRAINOSUS: ICD-10-CM

## 2023-11-29 NOTE — TELEPHONE ENCOUNTER
/Rx Refill Request Telephone Encounter    Name:  Jeffry Lynn  :  [id number]  Medication Name:    Requested Prescriptions     Pending Prescriptions Disp Refills    topiramate (Topamax) 100 mg tablet [Pharmacy Med Name: TOPIRAMATE 100 MG TABLET] 180 tablet 3     Sig: TAKE 1 TABLET BY MOUTH TWICE A DAY   Specific Pharmacy location:   [id number] [address] AT [place] [id number]  Phone: [phone] Fax: [phone]  Date of last appointment:  10/19/2023  Date of next appointment:  None scheduled  Best number to reach patient:  [phone] (home)

## 2023-12-01 RX ORDER — TOPIRAMATE 100 MG/1
100 TABLET, FILM COATED ORAL 2 TIMES DAILY
Qty: 180 TABLET | Refills: 3 | Status: SHIPPED | OUTPATIENT
Start: 2023-12-01

## 2023-12-12 PROBLEM — E66.01 MORBID OBESITY (MULTI): Status: ACTIVE | Noted: 2023-12-12

## 2023-12-12 PROBLEM — N25.81 HYPERPARATHYROIDISM DUE TO RENAL INSUFFICIENCY (MULTI): Status: ACTIVE | Noted: 2023-12-12

## 2023-12-12 PROBLEM — E78.5 DYSLIPIDEMIA: Status: RESOLVED | Noted: 2023-02-15 | Resolved: 2023-12-12

## 2023-12-12 PROBLEM — M17.10 PRIMARY LOCALIZED OSTEOARTHROSIS, LOWER LEG: Status: ACTIVE | Noted: 2023-12-12

## 2023-12-12 PROBLEM — I25.10 CAD (CORONARY ARTERY DISEASE): Status: RESOLVED | Noted: 2023-02-15 | Resolved: 2023-12-12

## 2023-12-12 PROBLEM — M17.10 KNEE ARTHROPATHY: Status: RESOLVED | Noted: 2023-02-15 | Resolved: 2023-12-12

## 2023-12-12 PROBLEM — R68.89 TOTAL SELF-CARE DEFICIT: Status: ACTIVE | Noted: 2023-12-12

## 2023-12-12 PROBLEM — E66.01 MORBID OBESITY (MULTI): Status: RESOLVED | Noted: 2023-12-12 | Resolved: 2023-12-12

## 2023-12-12 PROBLEM — E78.5 HYPERLIPIDEMIA: Status: ACTIVE | Noted: 2023-12-12

## 2023-12-12 PROBLEM — G44.209 MUSCLE TENSION HEADACHE: Status: ACTIVE | Noted: 2023-12-12

## 2023-12-12 PROBLEM — S49.90XA SHOULDER INJURY: Status: ACTIVE | Noted: 2023-03-14

## 2023-12-12 PROBLEM — M79.89 SWELLING OF LOWER EXTREMITY: Status: RESOLVED | Noted: 2023-02-15 | Resolved: 2023-12-12

## 2023-12-13 ENCOUNTER — OFFICE VISIT (OUTPATIENT)
Dept: CARDIOLOGY | Facility: CLINIC | Age: 73
End: 2023-12-13
Payer: MEDICARE

## 2023-12-13 VITALS
HEIGHT: 68 IN | WEIGHT: 275 LBS | OXYGEN SATURATION: 95 % | HEART RATE: 76 BPM | BODY MASS INDEX: 41.68 KG/M2 | DIASTOLIC BLOOD PRESSURE: 72 MMHG | SYSTOLIC BLOOD PRESSURE: 150 MMHG

## 2023-12-13 DIAGNOSIS — I25.10 CORONARY ARTERY CALCIFICATION SEEN ON CAT SCAN: ICD-10-CM

## 2023-12-13 DIAGNOSIS — R60.0 LEG EDEMA: ICD-10-CM

## 2023-12-13 DIAGNOSIS — E78.00 PURE HYPERCHOLESTEROLEMIA: ICD-10-CM

## 2023-12-13 DIAGNOSIS — N18.30 STAGE 3 CHRONIC KIDNEY DISEASE, UNSPECIFIED WHETHER STAGE 3A OR 3B CKD (MULTI): ICD-10-CM

## 2023-12-13 DIAGNOSIS — G47.33 OBSTRUCTIVE SLEEP APNEA SYNDROME: ICD-10-CM

## 2023-12-13 DIAGNOSIS — R07.89 CHEST PAIN, ATYPICAL: Primary | ICD-10-CM

## 2023-12-13 DIAGNOSIS — I10 ESSENTIAL HYPERTENSION: ICD-10-CM

## 2023-12-13 PROCEDURE — 1160F RVW MEDS BY RX/DR IN RCRD: CPT | Performed by: INTERNAL MEDICINE

## 2023-12-13 PROCEDURE — 3078F DIAST BP <80 MM HG: CPT | Performed by: INTERNAL MEDICINE

## 2023-12-13 PROCEDURE — 1159F MED LIST DOCD IN RCRD: CPT | Performed by: INTERNAL MEDICINE

## 2023-12-13 PROCEDURE — 1036F TOBACCO NON-USER: CPT | Performed by: INTERNAL MEDICINE

## 2023-12-13 PROCEDURE — 3077F SYST BP >= 140 MM HG: CPT | Performed by: INTERNAL MEDICINE

## 2023-12-13 PROCEDURE — 1126F AMNT PAIN NOTED NONE PRSNT: CPT | Performed by: INTERNAL MEDICINE

## 2023-12-13 PROCEDURE — 99214 OFFICE O/P EST MOD 30 MIN: CPT | Performed by: INTERNAL MEDICINE

## 2023-12-13 RX ORDER — HYDROGEN PEROXIDE 3 %
20 SOLUTION, NON-ORAL MISCELLANEOUS
COMMUNITY

## 2023-12-13 RX ORDER — METOPROLOL SUCCINATE 50 MG/1
50 TABLET, EXTENDED RELEASE ORAL DAILY
Qty: 90 TABLET | Refills: 3 | Status: SHIPPED | OUTPATIENT
Start: 2023-12-13 | End: 2024-12-12

## 2023-12-13 NOTE — PROGRESS NOTES
"Chief Complaint:   Hypertension (6 month follow up)     History Of Present Illness:    Jeffry Lynn is a 73 y.o. male presenting with CV DZ.  Several weeks ago had some L upper chest pain associated with arm movements  Some GROSS  No definite angina  Patient denies palpitations/dizziness/lightheadedness/edema/claudication    No regular exercise but walks in grocery store12/2023       Last Recorded Vitals:  Vitals:    12/13/23 1406 12/13/23 1428   BP: 148/78 150/72   BP Location: Left arm    Patient Position: Sitting    BP Cuff Size: Large adult    Pulse: 76    SpO2: 95%    Weight: 125 kg (275 lb)    Height: 1.727 m (5' 8\")             Allergies:  Propoxyphene n-acetaminophen    Outpatient Medications:  Current Outpatient Medications   Medication Instructions    aspirin 81 mg EC tablet 1 tablet, oral, Daily    atorvastatin (LIPITOR) 40 mg, oral, Daily    esomeprazole (NEXIUM) 20 mg, oral, Daily before breakfast, Do not open capsule.    fluticasone (Flonase) 50 mcg/actuation nasal spray 1 spray, nasal, Daily    gabapentin (Neurontin) 300 mg capsule TAKE 2 CAPSULES BY MOUTH IN THE  MORNING AND 2 CAPSULES BY MOUTH  IN THE EVENING TWICE DAILY    glimepiride (Amaryl) 2 mg tablet 1 tablet, oral, Daily before breakfast    hydroCHLOROthiazide (HYDRODiuril) 25 mg tablet 1 tablet, oral, Daily    levothyroxine (SYNTHROID, LEVOXYL) 25 mcg, oral, Daily    lidocaine (Lidoderm) 5 % patch 1 patch, transdermal, Daily, Apply to painful area 12 hours per day, remove for 12 hours.    loratadine (Claritin) 10 mg tablet 1 tablet, oral, Daily    metFORMIN XR (GLUCOPHAGE-XR) 500 mg, oral, 2 times daily with meals, Do not crush, chew, or split.    metoprolol succinate XL (Toprol-XL) 25 mg 24 hr tablet 1 tablet, oral, Daily    multivitamin-iron-folic acid (Centrum)  mg-mcg tablet tablet 1 tablet, oral, Daily    OneTouch Delica Plus Lancet 33 gauge misc TEST TWICE DAILY AND AS NEEDED    OneTouch Verio Flex meter misc use as directed.    " OneTouch Verio test strips strip TEST TWICE DAILY AS NEEDED    pioglitazone (Actos) 45 mg tablet TAKE 1 TABLET BY MOUTH ONCE  DAILY    topiramate (TOPAMAX) 100 mg, oral, 2 times daily    Trulicity 0.75 mg, subcutaneous, Weekly       Physical Exam:  Constitutional:       Appearance: Healthy appearance. Not in distress. Obese.   Neck:      Vascular: No JVR. JVD normal.   Pulmonary:      Effort: Pulmonary effort is normal.      Breath sounds: Normal breath sounds. No wheezing. No rhonchi. No rales.   Chest:      Chest wall: Not tender to palpatation.   Cardiovascular:      PMI at left midclavicular line. Normal rate. Regular rhythm. Normal S1. Normal S2.       Murmurs: There is no murmur.      No gallop.  No click. No rub.   Pulses:     Intact distal pulses.   Edema:     Peripheral edema absent.   Abdominal:      General: Abdomen is protuberant. Bowel sounds are normal.      Palpations: Abdomen is soft.      Tenderness: There is no abdominal tenderness.   Musculoskeletal: Normal range of motion.         General: No tenderness. Skin:     General: Skin is warm and dry.   Neurological:      General: No focal deficit present.      Mental Status: Alert and oriented to person, place and time.          Last Labs:  CBC -  Lab Results   Component Value Date    WBC 7.8 03/28/2022    HGB 12.4 (L) 03/28/2022    HCT 39.5 (L) 03/28/2022    MCV 96 03/28/2022     03/28/2022       CMP -  Lab Results   Component Value Date    CALCIUM 9.6 08/24/2022    PROT 6.9 03/28/2022    ALBUMIN 4.3 03/28/2022    AST 14 03/28/2022    ALT 14 03/28/2022    ALKPHOS 102 03/28/2022    BILITOT 0.7 03/28/2022       LIPID PANEL -   Lab Results   Component Value Date    CHOL 131 03/02/2022    TRIG 124 03/02/2022    HDL 32.5 (A) 03/02/2022    CHHDL 4.0 03/02/2022    LDLF 74 03/02/2022    VLDL 25 03/02/2022 6/2023  COYRP=948  NP=444  LDL=41    RENAL FUNCTION PANEL -   Lab Results   Component Value Date    GLUCOSE 126 (H) 08/24/2022      08/24/2022    K 4.3 08/24/2022     08/24/2022    CO2 26 08/24/2022    ANIONGAP 12 08/24/2022    BUN 30 (H) 08/24/2022    CREATININE 1.99 (H) 08/24/2022    GFRMALE 35 (A) 08/24/2022    CALCIUM 9.6 08/24/2022    ALBUMIN 4.3 03/28/2022 12/2023 CR=1.5    Lab Results   Component Value Date    BNP 23 04/27/2019    HGBA1C 6.6 (A) 08/10/2023           Lab review: I have personally reviewed the laboratory result(s)       Problem List Items Addressed This Visit       Chest pain, atypical - Primary    Overview     In light of CAC and no functional testing in 3 years will check stress test         CKD (chronic kidney disease), stage III (CMS/HCC)    Overview     Cr=1.43 1/2020   Cr=1.56 3/2022 - recheck BMP   Cr=1.99 8/2022 - decreased olmesartan and olmesartan then stopped  Cr=1.5   Seeing Nephrology          Coronary artery calcification seen on CAT scan    Overview     Extensive 4V cor calcification per 11/2020 chest CT   Pt with no definite angina. But atypical CP as above thus check stress test (1/2021 nuc stress test with poor exercise but NL perfusion / NL LVEF )  On ASA / statin / beta blocker   Follow         Essential hypertension    Overview     BP elevated-increase metoprolol  No ACE/ARB or increase in hydrochlorothiazide as has renal insuff         Obstructive sleep apnea syndrome    Overview     Moderate - wearing CPAP         Leg edema    Overview     Suspect more peripheral than central - 1/2021 echo with Nl LVEF   Stable - watch salt intake / elevate legs   Encourage use of compression socks   No increase in diuretic as Cr increased         Hyperlipidemia    Overview     Warrants HI statin with DM / HTN / CAC - on atorvastatin   6/2023 LDL=41          Weight loss  Lexiscan stress test=CAC/CP  Increase metoprolol to 50 mg daily for BP        Corey Farris, DO

## 2023-12-15 RX ORDER — ATORVASTATIN CALCIUM 40 MG/1
40 TABLET, FILM COATED ORAL DAILY
Qty: 90 TABLET | Refills: 3 | Status: SHIPPED | OUTPATIENT
Start: 2023-12-27

## 2023-12-19 ENCOUNTER — ANCILLARY PROCEDURE (OUTPATIENT)
Dept: RADIOLOGY | Facility: CLINIC | Age: 73
End: 2023-12-19
Payer: MEDICARE

## 2023-12-19 ENCOUNTER — HOSPITAL ENCOUNTER (OUTPATIENT)
Dept: CARDIOLOGY | Facility: CLINIC | Age: 73
Discharge: HOME | End: 2023-12-19
Payer: MEDICARE

## 2023-12-19 DIAGNOSIS — R07.89 CHEST PAIN, ATYPICAL: Primary | ICD-10-CM

## 2023-12-19 DIAGNOSIS — I25.10 CORONARY ARTERY CALCIFICATION SEEN ON CAT SCAN: ICD-10-CM

## 2023-12-19 DIAGNOSIS — I25.10 CAD (CORONARY ARTERY DISEASE): Primary | ICD-10-CM

## 2023-12-19 PROCEDURE — 78452 HT MUSCLE IMAGE SPECT MULT: CPT

## 2023-12-19 PROCEDURE — 2500000004 HC RX 250 GENERAL PHARMACY W/ HCPCS (ALT 636 FOR OP/ED): Performed by: INTERNAL MEDICINE

## 2023-12-19 PROCEDURE — 78452 HT MUSCLE IMAGE SPECT MULT: CPT | Performed by: INTERNAL MEDICINE

## 2023-12-19 PROCEDURE — 93016 CV STRESS TEST SUPVJ ONLY: CPT | Performed by: INTERNAL MEDICINE

## 2023-12-19 PROCEDURE — 93017 CV STRESS TEST TRACING ONLY: CPT | Mod: MUE

## 2023-12-19 PROCEDURE — 93017 CV STRESS TEST TRACING ONLY: CPT

## 2023-12-19 RX ORDER — REGADENOSON 0.08 MG/ML
0.4 INJECTION, SOLUTION INTRAVENOUS ONCE
Status: COMPLETED | OUTPATIENT
Start: 2023-12-19 | End: 2023-12-19

## 2023-12-19 RX ADMIN — REGADENOSON 0.4 MG: 0.08 INJECTION, SOLUTION INTRAVENOUS at 09:25

## 2024-01-06 DIAGNOSIS — E11.9 TYPE 2 DIABETES MELLITUS WITHOUT COMPLICATION, WITHOUT LONG-TERM CURRENT USE OF INSULIN (MULTI): ICD-10-CM

## 2024-01-08 RX ORDER — BLOOD-GLUCOSE METER
EACH MISCELLANEOUS
Qty: 100 STRIP | Refills: 1 | Status: SHIPPED | OUTPATIENT
Start: 2024-01-08 | End: 2024-04-05

## 2024-01-10 ENCOUNTER — OFFICE VISIT (OUTPATIENT)
Dept: PRIMARY CARE | Facility: CLINIC | Age: 74
End: 2024-01-10
Payer: MEDICARE

## 2024-01-10 VITALS
BODY MASS INDEX: 41.37 KG/M2 | HEART RATE: 58 BPM | DIASTOLIC BLOOD PRESSURE: 78 MMHG | OXYGEN SATURATION: 96 % | WEIGHT: 273 LBS | HEIGHT: 68 IN | SYSTOLIC BLOOD PRESSURE: 138 MMHG

## 2024-01-10 DIAGNOSIS — Z23 ENCOUNTER FOR VACCINATION: ICD-10-CM

## 2024-01-10 DIAGNOSIS — I25.10 CORONARY ARTERY CALCIFICATION SEEN ON CAT SCAN: ICD-10-CM

## 2024-01-10 DIAGNOSIS — E11.69 TYPE 2 DIABETES MELLITUS WITH OTHER SPECIFIED COMPLICATION, WITH LONG-TERM CURRENT USE OF INSULIN (MULTI): ICD-10-CM

## 2024-01-10 DIAGNOSIS — N40.0 BENIGN PROSTATIC HYPERPLASIA, UNSPECIFIED WHETHER LOWER URINARY TRACT SYMPTOMS PRESENT: ICD-10-CM

## 2024-01-10 DIAGNOSIS — Z00.00 ANNUAL PHYSICAL EXAM: Primary | ICD-10-CM

## 2024-01-10 DIAGNOSIS — E03.9 HYPOTHYROIDISM, UNSPECIFIED TYPE: ICD-10-CM

## 2024-01-10 DIAGNOSIS — J34.9 SINUS PROBLEM: ICD-10-CM

## 2024-01-10 DIAGNOSIS — I10 ESSENTIAL HYPERTENSION: ICD-10-CM

## 2024-01-10 DIAGNOSIS — Z79.4 TYPE 2 DIABETES MELLITUS WITH OTHER SPECIFIED COMPLICATION, WITH LONG-TERM CURRENT USE OF INSULIN (MULTI): ICD-10-CM

## 2024-01-10 DIAGNOSIS — Z13.6 ENCOUNTER FOR ABDOMINAL AORTIC ANEURYSM (AAA) SCREENING: ICD-10-CM

## 2024-01-10 DIAGNOSIS — N18.30 STAGE 3 CHRONIC KIDNEY DISEASE, UNSPECIFIED WHETHER STAGE 3A OR 3B CKD (MULTI): ICD-10-CM

## 2024-01-10 DIAGNOSIS — G47.33 OBSTRUCTIVE SLEEP APNEA SYNDROME: ICD-10-CM

## 2024-01-10 DIAGNOSIS — K21.9 GASTROESOPHAGEAL REFLUX DISEASE WITHOUT ESOPHAGITIS: ICD-10-CM

## 2024-01-10 DIAGNOSIS — G58.9 PINCHED NERVE IN NECK: ICD-10-CM

## 2024-01-10 PROCEDURE — 1160F RVW MEDS BY RX/DR IN RCRD: CPT | Performed by: NURSE PRACTITIONER

## 2024-01-10 PROCEDURE — 1036F TOBACCO NON-USER: CPT | Performed by: NURSE PRACTITIONER

## 2024-01-10 PROCEDURE — 3078F DIAST BP <80 MM HG: CPT | Performed by: NURSE PRACTITIONER

## 2024-01-10 PROCEDURE — 1126F AMNT PAIN NOTED NONE PRSNT: CPT | Performed by: NURSE PRACTITIONER

## 2024-01-10 PROCEDURE — G0009 ADMIN PNEUMOCOCCAL VACCINE: HCPCS | Performed by: NURSE PRACTITIONER

## 2024-01-10 PROCEDURE — 90677 PCV20 VACCINE IM: CPT | Performed by: NURSE PRACTITIONER

## 2024-01-10 PROCEDURE — 99215 OFFICE O/P EST HI 40 MIN: CPT | Performed by: NURSE PRACTITIONER

## 2024-01-10 PROCEDURE — 3075F SYST BP GE 130 - 139MM HG: CPT | Performed by: NURSE PRACTITIONER

## 2024-01-10 PROCEDURE — 1159F MED LIST DOCD IN RCRD: CPT | Performed by: NURSE PRACTITIONER

## 2024-01-10 RX ORDER — FLUTICASONE PROPIONATE 50 MCG
1 SPRAY, SUSPENSION (ML) NASAL DAILY
Qty: 16 G | Refills: 1 | Status: SHIPPED | OUTPATIENT
Start: 2024-01-10 | End: 2024-04-17 | Stop reason: SDUPTHER

## 2024-01-10 RX ORDER — METHYLPREDNISOLONE 4 MG/1
TABLET ORAL
Qty: 21 TABLET | Refills: 0 | Status: SHIPPED | OUTPATIENT
Start: 2024-01-10 | End: 2024-01-17

## 2024-01-10 ASSESSMENT — ENCOUNTER SYMPTOMS
WEAKNESS: 0
DIZZINESS: 0
HEMATURIA: 0
ENDOCRINE NEGATIVE: 1
NEUROLOGICAL NEGATIVE: 1
WOUND: 0
LIGHT-HEADEDNESS: 0
MYALGIAS: 1
NECK PAIN: 1
NERVOUS/ANXIOUS: 0
DYSURIA: 0
RESPIRATORY NEGATIVE: 1
FACIAL ASYMMETRY: 0
FLANK PAIN: 0
CARDIOVASCULAR NEGATIVE: 1
HEMATOLOGIC/LYMPHATIC NEGATIVE: 1
SLEEP DISTURBANCE: 0
EYES NEGATIVE: 1
FREQUENCY: 0
GASTROINTESTINAL NEGATIVE: 1
CONFUSION: 0
NECK STIFFNESS: 1
DIFFICULTY URINATING: 0
POLYPHAGIA: 0
PSYCHIATRIC NEGATIVE: 1
DEPRESSION: 0
CONSTITUTIONAL NEGATIVE: 1

## 2024-01-16 ENCOUNTER — TELEPHONE (OUTPATIENT)
Dept: PRIMARY CARE | Facility: CLINIC | Age: 74
End: 2024-01-16
Payer: MEDICARE

## 2024-01-25 ENCOUNTER — HOSPITAL ENCOUNTER (OUTPATIENT)
Dept: VASCULAR MEDICINE | Facility: HOSPITAL | Age: 74
Discharge: HOME | End: 2024-01-25
Payer: MEDICARE

## 2024-01-25 DIAGNOSIS — Z13.6 ENCOUNTER FOR ABDOMINAL AORTIC ANEURYSM (AAA) SCREENING: ICD-10-CM

## 2024-01-25 PROCEDURE — 76706 US ABDL AORTA SCREEN AAA: CPT | Performed by: SURGERY

## 2024-01-25 PROCEDURE — 76706 US ABDL AORTA SCREEN AAA: CPT

## 2024-01-29 DIAGNOSIS — E11.9 TYPE 2 DIABETES MELLITUS WITHOUT COMPLICATION, WITHOUT LONG-TERM CURRENT USE OF INSULIN (MULTI): ICD-10-CM

## 2024-01-30 RX ORDER — PIOGLITAZONEHYDROCHLORIDE 45 MG/1
TABLET ORAL
Qty: 90 TABLET | Refills: 3 | Status: SHIPPED | OUTPATIENT
Start: 2024-01-30

## 2024-02-19 DIAGNOSIS — E11.9 TYPE 2 DIABETES MELLITUS WITHOUT COMPLICATION, WITHOUT LONG-TERM CURRENT USE OF INSULIN (MULTI): ICD-10-CM

## 2024-02-20 ENCOUNTER — OFFICE VISIT (OUTPATIENT)
Dept: PAIN MEDICINE | Facility: CLINIC | Age: 74
End: 2024-02-20
Payer: MEDICARE

## 2024-02-20 VITALS — HEART RATE: 50 BPM | DIASTOLIC BLOOD PRESSURE: 85 MMHG | SYSTOLIC BLOOD PRESSURE: 183 MMHG | RESPIRATION RATE: 20 BRPM

## 2024-02-20 DIAGNOSIS — M54.50 CHRONIC BILATERAL LOW BACK PAIN WITHOUT SCIATICA: Primary | ICD-10-CM

## 2024-02-20 DIAGNOSIS — G89.29 CHRONIC BILATERAL LOW BACK PAIN WITHOUT SCIATICA: Primary | ICD-10-CM

## 2024-02-20 PROCEDURE — 1159F MED LIST DOCD IN RCRD: CPT | Performed by: NURSE PRACTITIONER

## 2024-02-20 PROCEDURE — 1036F TOBACCO NON-USER: CPT | Performed by: NURSE PRACTITIONER

## 2024-02-20 PROCEDURE — 3079F DIAST BP 80-89 MM HG: CPT | Performed by: NURSE PRACTITIONER

## 2024-02-20 PROCEDURE — 99214 OFFICE O/P EST MOD 30 MIN: CPT | Performed by: NURSE PRACTITIONER

## 2024-02-20 PROCEDURE — 3077F SYST BP >= 140 MM HG: CPT | Performed by: NURSE PRACTITIONER

## 2024-02-20 PROCEDURE — 1125F AMNT PAIN NOTED PAIN PRSNT: CPT | Performed by: NURSE PRACTITIONER

## 2024-02-20 PROCEDURE — 99214 OFFICE O/P EST MOD 30 MIN: CPT | Mod: ZK | Performed by: NURSE PRACTITIONER

## 2024-02-20 RX ORDER — TRAMADOL HYDROCHLORIDE 50 MG/1
50 TABLET ORAL 2 TIMES DAILY PRN
Qty: 60 TABLET | Refills: 0 | Status: SHIPPED | OUTPATIENT
Start: 2024-02-20 | End: 2024-03-21

## 2024-02-20 ASSESSMENT — PAIN SCALES - GENERAL
PAINLEVEL: 7
PAINLEVEL_OUTOF10: 7

## 2024-02-20 ASSESSMENT — PAIN DESCRIPTION - DESCRIPTORS: DESCRIPTORS: SHOOTING;SHARP

## 2024-02-20 ASSESSMENT — PAIN - FUNCTIONAL ASSESSMENT: PAIN_FUNCTIONAL_ASSESSMENT: 0-10

## 2024-02-20 NOTE — H&P
Subjective   Patient ID: Jeffry Lynn is a 73 y.o. male who presents for Back Pain.  HPI  73 years old, male he is here today for a follow up. He is known in this clinic because of chronic back pain. He is maintained on Gabapentin from another Provider.  Today he states that he is having a flare up with his back pain. Level of pain is about 8/10 at this time.  Started about a week and a half ago, while he and wife on a cruise.  He states that he lifted a heavy suitcase and that flared up his back pain. He had Physical Therapy for his back less than a  Year ago, and it helped some.  He describes the pain as going across his back. OARRS obtained and reviewed, no abuse or misuse with prescribed medication noted.  He is able to perform activities of daily living, with some difficulty.  He had history of 2 back surgery in 2014. He had fusion at level L4-L5 complicated with a seroma that required second intervention and rehabilitation for multiple weeks. His Oswestry Low Back disability score is 21 (moderate disability level).    Review of Systems   Review of systems x 10 is negative.   No recent injury or falls reported.   No recent change in medical condition reported.   No recent weakness reported.   Still able to control bowel and bladder function.   Denies fever, cough, shortness of breath recently.   No interval change with medication/health issues reported.    Objective   Physical Exam  Awake,alert, no acute distress, appropriate.  Spine is of normal curvature.  Full ROM on all 4 extremities, sensation and motor intact, no vascular compromise.  No pedal edema, normal gait, slow.  Skin warm, dry, intact, turgor is normal.  Denies any numbness, tingling.  BP (!) 183/85 (BP Location: Right arm, Patient Position: Sitting)   Pulse 50   Resp 20   Taking Metoprolol, Hydrodiuril as prescribed by his other Doctor.  Assessment/Plan     Discuss options for him. Explained about diagnostic facet median nerve branch block  to rule out the facet as the cause of the back pain. If there is improvement will proceeed with radiofrequency ablation for further control of the back pain.  Will obtain first lumbar spine xray for further assessment of his back pain. He is also requesting for a stronger pain medications for his back. Discussed case with Dr. Nuñez, he recommends Tramadol. Will try on Tramadol 50 mg BID as needed for pain for one month. Continue Gabapentin as prescribed by another Doctor.  Explained plan to this patient, and patient verbalized understanding and agreement with the plan. If there is questions or concerns, please feel free to contact me to clarify.    Chronic back pain associated with lumbago, lumbar spondylosis, s/p lumbar fusion with acute exacervation.       Mali Alfonso, ESTRELLA-CNP 02/20/24 11:03 AM

## 2024-02-20 NOTE — PROGRESS NOTES
Subjective   Jeffry Lynn is a 73 y.o. male who presents for evaluation of his back pain.  The pain is located in the lumbar area..  The pain started a few weeks ago and symptoms have been worsening. He states his pain 7/10 and hurting after he lifted something heavy.   Past Medical History:   Diagnosis Date    ANSELMO (acute kidney injury) (CMS/HCC)     Apnea, sleep     Arthritis     BPH (benign prostatic hyperplasia)     CAD (coronary artery disease)     Cholestasis     CKD (chronic kidney disease), stage III (CMS/HCC)     Coronary artery calcification seen on CAT scan     Diabetic neuropathy (CMS/[facility])     Dyslipidemia     Essential familial hypercholesterolemia     Essential hypertension     GERD (gastroesophageal reflux disease)     History of acute prostatitis     History of deep venous thrombosis     History of melena     Hyperesthesia     Abdominal hyperesthesia    Hypotension     Hypothyroidism     Knee arthropathy     Left shoulder tendinitis     Lumbar canal stenosis     Migraine with aura, not intractable, without status migrainosus     Multiple lung nodules     Obesity, morbid, BMI 40.0-49.9 (CMS/HCC)     Obstructive sleep apnea     Paresthesia     Personal history of gout     Post-nasal drip     Rickets, active     Sialoadenitis of submandibular gland     Snores     SOBOE (shortness of breath on exertion)     Spondylolisthesis of lumbar region     Type 2 diabetes mellitus (CMS/HCC)     Unilateral primary osteoarthritis, left knee     Left knee DJD    Urine incontinence     Vitamin D deficiency      Past Surgical History:   Procedure Laterality Date    CARPAL TUNNEL RELEASE      COLONOSCOPY      KNEE SURGERY      LUMBAR FUSION      TONSILLECTOMY  1950    UMBILICAL HERNIA REPAIR         I have reviewed the nurses notes and am aware of family/social history.     Review of Systems    Objective   Physical Exam    ASSESSMENT:     PLAN:   Discussed treatment plan with patient.   Call or return to clinic prn if  these symptoms worsen or fail to improve as anticipated.     Tracey Mccoy RN

## 2024-02-21 ENCOUNTER — HOSPITAL ENCOUNTER (OUTPATIENT)
Dept: RADIOLOGY | Facility: HOSPITAL | Age: 74
Discharge: HOME | End: 2024-02-21
Payer: MEDICARE

## 2024-02-21 DIAGNOSIS — G89.29 CHRONIC BILATERAL LOW BACK PAIN WITHOUT SCIATICA: ICD-10-CM

## 2024-02-21 DIAGNOSIS — M54.50 CHRONIC BILATERAL LOW BACK PAIN WITHOUT SCIATICA: ICD-10-CM

## 2024-02-21 PROCEDURE — 72110 X-RAY EXAM L-2 SPINE 4/>VWS: CPT | Performed by: RADIOLOGY

## 2024-02-21 PROCEDURE — 72110 X-RAY EXAM L-2 SPINE 4/>VWS: CPT

## 2024-02-21 RX ORDER — GABAPENTIN 300 MG/1
CAPSULE ORAL
Qty: 360 CAPSULE | Refills: 3 | Status: SHIPPED | OUTPATIENT
Start: 2024-02-21 | End: 2024-06-04 | Stop reason: SDUPTHER

## 2024-02-27 ENCOUNTER — OFFICE VISIT (OUTPATIENT)
Dept: URGENT CARE | Facility: CLINIC | Age: 74
End: 2024-02-27
Payer: MEDICARE

## 2024-02-27 VITALS
TEMPERATURE: 97.4 F | DIASTOLIC BLOOD PRESSURE: 76 MMHG | RESPIRATION RATE: 20 BRPM | SYSTOLIC BLOOD PRESSURE: 144 MMHG | HEART RATE: 72 BPM | OXYGEN SATURATION: 93 %

## 2024-02-27 DIAGNOSIS — Z78.9 RECENT FOREIGN TRAVEL: ICD-10-CM

## 2024-02-27 DIAGNOSIS — J01.90 ACUTE SINUSITIS, RECURRENCE NOT SPECIFIED, UNSPECIFIED LOCATION: Primary | ICD-10-CM

## 2024-02-27 PROCEDURE — 1125F AMNT PAIN NOTED PAIN PRSNT: CPT | Performed by: PHYSICIAN ASSISTANT

## 2024-02-27 PROCEDURE — 99203 OFFICE O/P NEW LOW 30 MIN: CPT | Performed by: PHYSICIAN ASSISTANT

## 2024-02-27 PROCEDURE — 1036F TOBACCO NON-USER: CPT | Performed by: PHYSICIAN ASSISTANT

## 2024-02-27 PROCEDURE — 3078F DIAST BP <80 MM HG: CPT | Performed by: PHYSICIAN ASSISTANT

## 2024-02-27 PROCEDURE — 3077F SYST BP >= 140 MM HG: CPT | Performed by: PHYSICIAN ASSISTANT

## 2024-02-27 PROCEDURE — 1159F MED LIST DOCD IN RCRD: CPT | Performed by: PHYSICIAN ASSISTANT

## 2024-02-27 RX ORDER — BENZONATATE 100 MG/1
100 CAPSULE ORAL 3 TIMES DAILY PRN
Qty: 30 CAPSULE | Refills: 0 | Status: SHIPPED | OUTPATIENT
Start: 2024-02-27 | End: 2024-03-08

## 2024-02-27 RX ORDER — DOXYCYCLINE 100 MG/1
100 CAPSULE ORAL 2 TIMES DAILY
Qty: 20 CAPSULE | Refills: 0 | Status: SHIPPED | OUTPATIENT
Start: 2024-02-27 | End: 2024-03-08

## 2024-02-27 ASSESSMENT — ENCOUNTER SYMPTOMS
SINUS PRESSURE: 1
SORE THROAT: 1
COUGH: 1

## 2024-02-27 ASSESSMENT — PAIN SCALES - GENERAL: PAINLEVEL: 1

## 2024-02-27 NOTE — PROGRESS NOTES
Subjective   Patient ID: Jeffry Lynn is a 73 y.o. male.    Patient is a 73-year-old male who complains of worsening congestion, sinus pressure and cough that he has been experiencing for the past 2 weeks.  Patient denies fever, chills or myalgia.  Patient states that he recently returned from a cruise.  Patient states that his wife developed similar symptoms but her symptoms are now resolving.      Cough  Associated symptoms include a sore throat.   The following portions of the chart were reviewed this encounter and updated as appropriate:       Review of Systems   HENT:  Positive for congestion, sinus pressure and sore throat.    Respiratory:  Positive for cough.    All other systems reviewed and are negative.  Objective   Physical Exam  Vitals and nursing note reviewed.   Constitutional:       Appearance: Normal appearance. He is normal weight.   HENT:      Head: Normocephalic and atraumatic.      Right Ear: Tympanic membrane, ear canal and external ear normal.      Left Ear: Tympanic membrane, ear canal and external ear normal.      Nose: Nose normal. No congestion or rhinorrhea.      Mouth/Throat:      Mouth: Mucous membranes are moist.      Pharynx: Oropharynx is clear. No oropharyngeal exudate or posterior oropharyngeal erythema.   Eyes:      Extraocular Movements: Extraocular movements intact.      Conjunctiva/sclera: Conjunctivae normal.      Pupils: Pupils are equal, round, and reactive to light.   Cardiovascular:      Rate and Rhythm: Normal rate and regular rhythm.      Pulses: Normal pulses.      Heart sounds: Normal heart sounds.   Pulmonary:      Effort: Pulmonary effort is normal. No respiratory distress.      Breath sounds: Normal breath sounds. No stridor. No wheezing, rhonchi or rales.   Musculoskeletal:      Cervical back: Normal range of motion and neck supple.   Skin:     General: Skin is warm and dry.      Capillary Refill: Capillary refill takes less than 2 seconds.   Neurological:       General: No focal deficit present.      Mental Status: He is alert and oriented to person, place, and time.   Psychiatric:         Mood and Affect: Mood normal.         Behavior: Behavior normal.         Thought Content: Thought content normal.         Judgment: Judgment normal.     Assessment/Plan   Physical exam findings as noted above.  Patient was provided with prescriptions for doxycycline 100 mg and Tessalon 100 mg.  Supportive care instructions were discussed and the patient verbalizes good understanding of same.    CLINICAL IMPRESSION:  Acute Sinusitis;  Recent Foreign Travel    Diagnoses and all orders for this visit:  Acute sinusitis, recurrence not specified, unspecified location  -     doxycycline (Monodox) 100 mg capsule; Take 1 capsule (100 mg) by mouth 2 times a day for 10 days. Take with at least 8 ounces (large glass) of water, do not lie down for 30 minutes after  -     benzonatate (Tessalon Perles) 100 mg capsule; Take 1 capsule (100 mg) by mouth 3 times a day as needed for cough for up to 10 days.  Recent foreign travel

## 2024-02-29 DIAGNOSIS — M47.816 LUMBAR SPONDYLOSIS: Primary | ICD-10-CM

## 2024-04-04 ENCOUNTER — TELEPHONE (OUTPATIENT)
Dept: PRIMARY CARE | Facility: CLINIC | Age: 74
End: 2024-04-04
Payer: MEDICARE

## 2024-04-04 DIAGNOSIS — S93.699A: ICD-10-CM

## 2024-04-04 DIAGNOSIS — R53.1 WEAKNESS: Primary | ICD-10-CM

## 2024-04-05 DIAGNOSIS — E11.9 TYPE 2 DIABETES MELLITUS WITHOUT COMPLICATION, WITHOUT LONG-TERM CURRENT USE OF INSULIN (MULTI): ICD-10-CM

## 2024-04-05 RX ORDER — BLOOD-GLUCOSE METER
EACH MISCELLANEOUS
Qty: 200 STRIP | Refills: 3 | Status: SHIPPED | OUTPATIENT
Start: 2024-04-05

## 2024-04-08 ENCOUNTER — HOSPITAL ENCOUNTER (OUTPATIENT)
Dept: RADIOLOGY | Facility: EXTERNAL LOCATION | Age: 74
Discharge: HOME | End: 2024-04-08

## 2024-04-08 ENCOUNTER — OFFICE VISIT (OUTPATIENT)
Dept: URGENT CARE | Facility: CLINIC | Age: 74
End: 2024-04-08
Payer: MEDICARE

## 2024-04-08 VITALS
TEMPERATURE: 97.9 F | SYSTOLIC BLOOD PRESSURE: 125 MMHG | WEIGHT: 262 LBS | DIASTOLIC BLOOD PRESSURE: 71 MMHG | RESPIRATION RATE: 20 BRPM | BODY MASS INDEX: 39.84 KG/M2 | HEART RATE: 74 BPM | OXYGEN SATURATION: 96 %

## 2024-04-08 DIAGNOSIS — S93.402A SPRAIN OF LEFT ANKLE, UNSPECIFIED LIGAMENT, INITIAL ENCOUNTER: ICD-10-CM

## 2024-04-08 DIAGNOSIS — S93.402A SPRAIN OF LEFT ANKLE, UNSPECIFIED LIGAMENT, INITIAL ENCOUNTER: Primary | ICD-10-CM

## 2024-04-08 PROCEDURE — 3074F SYST BP LT 130 MM HG: CPT | Performed by: PHYSICIAN ASSISTANT

## 2024-04-08 PROCEDURE — 3078F DIAST BP <80 MM HG: CPT | Performed by: PHYSICIAN ASSISTANT

## 2024-04-08 PROCEDURE — 1036F TOBACCO NON-USER: CPT | Performed by: PHYSICIAN ASSISTANT

## 2024-04-08 PROCEDURE — 99213 OFFICE O/P EST LOW 20 MIN: CPT | Performed by: PHYSICIAN ASSISTANT

## 2024-04-08 PROCEDURE — 1159F MED LIST DOCD IN RCRD: CPT | Performed by: PHYSICIAN ASSISTANT

## 2024-04-08 NOTE — PROGRESS NOTES
Subjective   Patient ID: Jeffry Lynn is a 73 y.o. male.    Patient is a 73-year-old male who complains of continuing pain and swelling to his left ankle secondary to a fall injury that the patient sustained 8 days ago.  Patient reports that he was walking in his yard when he tripped over a stake twisting his left ankle.  Patient has been bearing weight and ambulating since his injury.  Patient denies paresthesia or paralysis to his left toes and foot.  Patient states he has no history of fracture or surgery to his left ankle.  Patient states that he is concerned that he is experienced no notable improvement in his pain and swelling.  Patient denies pain or injury to his left hip and knee.      Ankle Injury  The following portions of the chart were reviewed this encounter and updated as appropriate:       Review of Systems   Musculoskeletal:         Left Ankle Pain and Swelling   All other systems reviewed and are negative.  Objective   Physical Exam  Vitals and nursing note reviewed.   Constitutional:       Appearance: Normal appearance. He is obese.   Cardiovascular:      Pulses: Normal pulses.   Pulmonary:      Effort: Pulmonary effort is normal.      Breath sounds: Normal breath sounds.   Musculoskeletal:         General: Swelling, tenderness and signs of injury present. No deformity. Normal range of motion.   Skin:     General: Skin is warm and dry.      Capillary Refill: Capillary refill takes less than 2 seconds.      Findings: Bruising present. No erythema.   Neurological:      General: No focal deficit present.      Mental Status: He is alert and oriented to person, place, and time.      Motor: No weakness.      Gait: Gait normal.   Psychiatric:         Mood and Affect: Mood normal.         Behavior: Behavior normal.         Thought Content: Thought content normal.         Judgment: Judgment normal.     Assessment/Plan   Physical exam findings as noted above.  X-ray left ankle is negative for acute  findings and the radiologist does report degenerative changes which the patient states he is aware of.  Patient was provided with contact information for Dr. Goran Kelley and he was instructed to contact his office to schedule an appointment for further evaluation and management.  Additional supportive care was discussed and the patient verbalizes clear understanding of all of the above instructions.    CLINICAL IMPRESSION:  Sprain/Strain Left Ankle    Diagnoses and all orders for this visit:  Sprain of left ankle, unspecified ligament, initial encounter  -     XR ankle left 3+ views; Future      Patient disposition: Home

## 2024-04-16 DIAGNOSIS — G47.33 OBSTRUCTIVE SLEEP APNEA SYNDROME: Primary | ICD-10-CM

## 2024-04-17 ENCOUNTER — OFFICE VISIT (OUTPATIENT)
Dept: PRIMARY CARE | Facility: CLINIC | Age: 74
End: 2024-04-17
Payer: MEDICARE

## 2024-04-17 VITALS
BODY MASS INDEX: 40.14 KG/M2 | DIASTOLIC BLOOD PRESSURE: 78 MMHG | OXYGEN SATURATION: 97 % | WEIGHT: 264 LBS | SYSTOLIC BLOOD PRESSURE: 149 MMHG | HEART RATE: 58 BPM

## 2024-04-17 DIAGNOSIS — G47.33 OBSTRUCTIVE SLEEP APNEA SYNDROME: ICD-10-CM

## 2024-04-17 DIAGNOSIS — J34.9 SINUS PROBLEM: ICD-10-CM

## 2024-04-17 DIAGNOSIS — L03.116 CELLULITIS OF LEFT LOWER EXTREMITY: Primary | ICD-10-CM

## 2024-04-17 PROCEDURE — 1036F TOBACCO NON-USER: CPT | Performed by: NURSE PRACTITIONER

## 2024-04-17 PROCEDURE — 99213 OFFICE O/P EST LOW 20 MIN: CPT | Performed by: NURSE PRACTITIONER

## 2024-04-17 PROCEDURE — 3077F SYST BP >= 140 MM HG: CPT | Performed by: NURSE PRACTITIONER

## 2024-04-17 PROCEDURE — 1159F MED LIST DOCD IN RCRD: CPT | Performed by: NURSE PRACTITIONER

## 2024-04-17 PROCEDURE — 3078F DIAST BP <80 MM HG: CPT | Performed by: NURSE PRACTITIONER

## 2024-04-17 RX ORDER — CEPHALEXIN 500 MG/1
500 CAPSULE ORAL 2 TIMES DAILY
Qty: 20 CAPSULE | Refills: 0 | Status: SHIPPED | OUTPATIENT
Start: 2024-04-17 | End: 2024-04-27

## 2024-04-17 RX ORDER — FLUTICASONE PROPIONATE 50 MCG
1 SPRAY, SUSPENSION (ML) NASAL DAILY
Qty: 16 G | Refills: 1 | Status: SHIPPED | OUTPATIENT
Start: 2024-04-17

## 2024-04-17 ASSESSMENT — ENCOUNTER SYMPTOMS
NERVOUS/ANXIOUS: 0
FACIAL ASYMMETRY: 0
GASTROINTESTINAL NEGATIVE: 1
WOUND: 0
PSYCHIATRIC NEGATIVE: 1
CONFUSION: 0
DIZZINESS: 0
WEAKNESS: 0
RESPIRATORY NEGATIVE: 1
CARDIOVASCULAR NEGATIVE: 1
SLEEP DISTURBANCE: 0
POLYPHAGIA: 0
EYES NEGATIVE: 1
LIGHT-HEADEDNESS: 0
HEMATOLOGIC/LYMPHATIC NEGATIVE: 1
MUSCULOSKELETAL NEGATIVE: 1
NEUROLOGICAL NEGATIVE: 1
CONSTITUTIONAL NEGATIVE: 1

## 2024-04-17 NOTE — PROGRESS NOTES
Subjective   Patient ID: Jeffry Lynn is a 73 y.o. male who presents for Ankle Injury (LT/Hit it on something/No broken bones/Went to  urgent care/Aches/3/31).    HPI presents to clinic for sick visit  Hit leg on sign and developed swelling   Was seen in urgeert care.  Worsening soft tissue swelling and pain noted to left anterior leg       Review of Systems   Constitutional: Negative.    Eyes: Negative.    Respiratory: Negative.     Cardiovascular: Negative.    Gastrointestinal: Negative.    Endocrine: Negative for polyphagia.   Genitourinary: Negative.    Musculoskeletal: Negative.    Skin:  Positive for rash. Negative for pallor and wound.   Neurological: Negative.  Negative for dizziness, facial asymmetry, weakness and light-headedness.   Hematological: Negative.    Psychiatric/Behavioral: Negative.  Negative for confusion, sleep disturbance and suicidal ideas. The patient is not nervous/anxious.    All other systems reviewed and are negative.      Objective   /78 (BP Location: Right arm, Patient Position: Sitting, BP Cuff Size: Large adult)   Pulse 58   Wt 120 kg (264 lb)   SpO2 97%   BMI 40.14 kg/m²     Physical Exam  Vitals and nursing note reviewed.   Constitutional:       Appearance: Normal appearance. He is normal weight.   HENT:      Head: Normocephalic.      Nose: Nose normal.      Mouth/Throat:      Mouth: Mucous membranes are moist.   Eyes:      Extraocular Movements: Extraocular movements intact.      Conjunctiva/sclera: Conjunctivae normal.      Pupils: Pupils are equal, round, and reactive to light.   Cardiovascular:      Rate and Rhythm: Normal rate and regular rhythm.      Pulses: Normal pulses.      Heart sounds: Normal heart sounds.   Pulmonary:      Effort: Pulmonary effort is normal.   Abdominal:      General: Abdomen is flat. Bowel sounds are normal.      Palpations: Abdomen is soft.   Musculoskeletal:         General: Normal range of motion.      Cervical back: Normal range of  motion.      Left lower leg: Edema present.   Skin:     General: Skin is warm and dry.      Findings: Lesion present.          Neurological:      General: No focal deficit present.      Mental Status: He is alert and oriented to person, place, and time.   Psychiatric:         Mood and Affect: Mood normal.         Behavior: Behavior normal.         Assessment/Plan   1. Obstructive sleep apnea syndrome    - fluticasone (Flonase) 50 mcg/actuation nasal spray; Administer 1 spray into each nostril once daily.  Dispense: 16 g; Refill: 1    2. Sinus problem    - fluticasone (Flonase) 50 mcg/actuation nasal spray; Administer 1 spray into each nostril once daily.  Dispense: 16 g; Refill: 1    3. Cellulitis of left lower extremity    - cephalexin (Keflex) 500 mg capsule; Take 1 capsule (500 mg) by mouth 2 times a day for 10 days.  Dispense: 20 capsule; Refill: 0      FU as needed

## 2024-04-20 DIAGNOSIS — R60.0 LEG EDEMA: ICD-10-CM

## 2024-04-20 DIAGNOSIS — I10 ESSENTIAL HYPERTENSION: ICD-10-CM

## 2024-04-22 RX ORDER — HYDROCHLOROTHIAZIDE 25 MG/1
25 TABLET ORAL DAILY
Qty: 90 TABLET | Refills: 2 | Status: SHIPPED | OUTPATIENT
Start: 2024-05-16

## 2024-05-20 PROBLEM — Z86.718 HISTORY OF DEEP VENOUS THROMBOSIS: Status: ACTIVE | Noted: 2024-05-20

## 2024-05-20 PROBLEM — I95.9 HYPOTENSION: Status: ACTIVE | Noted: 2024-05-20

## 2024-05-20 PROBLEM — K92.1 MELENA: Status: ACTIVE | Noted: 2024-05-20

## 2024-05-20 PROBLEM — T14.8XXA MUSCLE STRAIN: Status: ACTIVE | Noted: 2024-05-20

## 2024-05-20 PROBLEM — E87.6 HYPOKALEMIA: Status: ACTIVE | Noted: 2024-05-20

## 2024-05-20 PROBLEM — M79.672 PAIN OF LEFT FOOT: Status: ACTIVE | Noted: 2024-02-22

## 2024-05-20 PROBLEM — R42 DIZZINESS: Status: ACTIVE | Noted: 2024-05-20

## 2024-05-20 PROBLEM — M17.12 OSTEOARTHRITIS OF LEFT KNEE: Status: ACTIVE | Noted: 2023-12-12

## 2024-05-20 PROBLEM — T14.8XXA COMPRESSION INJURY OF NERVE: Status: ACTIVE | Noted: 2024-05-20

## 2024-05-20 PROBLEM — R51.9 HEADACHE: Status: ACTIVE | Noted: 2024-05-20

## 2024-05-20 PROBLEM — M79.674 PAIN IN RIGHT TOE(S): Status: ACTIVE | Noted: 2024-01-24

## 2024-05-20 PROBLEM — R06.2 WHEEZING: Status: ACTIVE | Noted: 2024-05-20

## 2024-05-20 PROBLEM — J12.9 VIRAL PNEUMONIA: Status: ACTIVE | Noted: 2024-05-20

## 2024-05-20 PROBLEM — K83.1 CHOLESTASIS (CMS-HCC): Status: ACTIVE | Noted: 2024-05-20

## 2024-05-20 PROBLEM — B35.1 ONYCHOMYCOSIS: Status: ACTIVE | Noted: 2024-01-24

## 2024-06-04 ENCOUNTER — OFFICE VISIT (OUTPATIENT)
Dept: PRIMARY CARE | Facility: CLINIC | Age: 74
End: 2024-06-04
Payer: MEDICARE

## 2024-06-04 ENCOUNTER — HOSPITAL ENCOUNTER (OUTPATIENT)
Dept: RADIOLOGY | Facility: HOSPITAL | Age: 74
Discharge: HOME | End: 2024-06-04
Payer: MEDICARE

## 2024-06-04 VITALS
DIASTOLIC BLOOD PRESSURE: 62 MMHG | WEIGHT: 263 LBS | OXYGEN SATURATION: 97 % | BODY MASS INDEX: 39.99 KG/M2 | HEART RATE: 58 BPM | TEMPERATURE: 97.3 F | SYSTOLIC BLOOD PRESSURE: 138 MMHG

## 2024-06-04 DIAGNOSIS — Z00.00 ANNUAL PHYSICAL EXAM: ICD-10-CM

## 2024-06-04 DIAGNOSIS — R06.02 SOB (SHORTNESS OF BREATH): ICD-10-CM

## 2024-06-04 DIAGNOSIS — M79.89 LEG SWELLING: Primary | ICD-10-CM

## 2024-06-04 DIAGNOSIS — E08.59 DIABETES MELLITUS DUE TO UNDERLYING CONDITION WITH OTHER CIRCULATORY COMPLICATIONS (MULTI): ICD-10-CM

## 2024-06-04 DIAGNOSIS — R60.0 LEG EDEMA: ICD-10-CM

## 2024-06-04 DIAGNOSIS — E11.9 TYPE 2 DIABETES MELLITUS WITHOUT COMPLICATION, WITHOUT LONG-TERM CURRENT USE OF INSULIN (MULTI): ICD-10-CM

## 2024-06-04 PROCEDURE — 3078F DIAST BP <80 MM HG: CPT | Performed by: NURSE PRACTITIONER

## 2024-06-04 PROCEDURE — 3075F SYST BP GE 130 - 139MM HG: CPT | Performed by: NURSE PRACTITIONER

## 2024-06-04 PROCEDURE — 1159F MED LIST DOCD IN RCRD: CPT | Performed by: NURSE PRACTITIONER

## 2024-06-04 PROCEDURE — 71046 X-RAY EXAM CHEST 2 VIEWS: CPT | Performed by: RADIOLOGY

## 2024-06-04 PROCEDURE — 1036F TOBACCO NON-USER: CPT | Performed by: NURSE PRACTITIONER

## 2024-06-04 PROCEDURE — 99213 OFFICE O/P EST LOW 20 MIN: CPT | Performed by: NURSE PRACTITIONER

## 2024-06-04 PROCEDURE — 71046 X-RAY EXAM CHEST 2 VIEWS: CPT

## 2024-06-04 RX ORDER — GABAPENTIN 600 MG/1
600 TABLET ORAL 3 TIMES DAILY
Qty: 90 CAPSULE | Refills: 1 | Status: SHIPPED | OUTPATIENT
Start: 2024-06-04 | End: 2024-07-04

## 2024-06-04 NOTE — PROGRESS NOTES
"Subjective   Patient ID: Jeffry Lynn is a 73 y.o. male who presents for Follow-up (Bruising  still present /neurapothy).    HPI     Jefrfy is here for FU.   Hit leg on sign and developed swelling   Was seen in [facility] care.  Worsening soft tissue swelling and pain noted to left anterior leg    was provided antibiotics    Still with 2+ LE edema   And \" shiny : leg appearance   Is on ACTOS: ( following with Endocrine)  Follows with Dr. Farris : cardiology       Review of Systems    Objective   /62   Pulse 58   Temp 36.3 °C (97.3 °F)   Wt 119 kg (263 lb)   SpO2 97%   BMI 39.99 kg/m²     Physical Exam    Assessment/Plan   1. Leg swelling  - Transthoracic Echo (TTE) Complete; Future  - Vascular US PVR without exercise; Future    2. Type 2 diabetes mellitus without complication, without long-term current use of insulin (Multi)  - gabapentin (Neurontin) 600 mg tablet; Take 1 tablet (600 mg) by mouth 3 times a day.  Dispense: 90 capsule; Refill: 1    3. Leg edema  - Transthoracic Echo (TTE) Complete; Future  - CBC; Future    4. 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 Annual physical exam    - Comprehensive Metabolic Panel; Future  - CBC; Future  - Prostate Specific Antigen, Screen; Future  - TSH with reflex to Free T4 if abnormal; Future  - Triiodothyronine, Total; Future  - Lipid Panel; Future  - Hemoglobin A1C; Future  - Creatinine, urine, random; Future  - Protein, Urine Random; Future  - Albumin, urine, random; Future    6. SOB (shortness of breath)    - XR chest 2 views; Future           FU in 1 month     "

## 2024-06-06 ENCOUNTER — LAB (OUTPATIENT)
Dept: LAB | Facility: LAB | Age: 74
End: 2024-06-06
Payer: MEDICARE

## 2024-06-06 DIAGNOSIS — R60.0 LEG EDEMA: ICD-10-CM

## 2024-06-06 DIAGNOSIS — Z00.00 ANNUAL PHYSICAL EXAM: ICD-10-CM

## 2024-06-06 LAB
ALBUMIN SERPL BCP-MCNC: 4.1 G/DL (ref 3.4–5)
ALP SERPL-CCNC: 98 U/L (ref 33–136)
ALT SERPL W P-5'-P-CCNC: 10 U/L (ref 10–52)
ANION GAP SERPL CALC-SCNC: 13 MMOL/L (ref 10–20)
AST SERPL W P-5'-P-CCNC: 13 U/L (ref 9–39)
BILIRUB SERPL-MCNC: 0.6 MG/DL (ref 0–1.2)
BUN SERPL-MCNC: 28 MG/DL (ref 6–23)
CALCIUM SERPL-MCNC: 9.5 MG/DL (ref 8.6–10.3)
CHLORIDE SERPL-SCNC: 106 MMOL/L (ref 98–107)
CHOLEST SERPL-MCNC: 115 MG/DL (ref 0–199)
CHOLESTEROL/HDL RATIO: 3.8
CO2 SERPL-SCNC: 27 MMOL/L (ref 21–32)
CREAT SERPL-MCNC: 1.5 MG/DL (ref 0.5–1.3)
CREAT UR-MCNC: 175.8 MG/DL (ref 20–370)
CREAT UR-MCNC: 175.8 MG/DL (ref 20–370)
EGFRCR SERPLBLD CKD-EPI 2021: 49 ML/MIN/1.73M*2
ERYTHROCYTE [DISTWIDTH] IN BLOOD BY AUTOMATED COUNT: 14.7 % (ref 11.5–14.5)
EST. AVERAGE GLUCOSE BLD GHB EST-MCNC: 166 MG/DL
GLUCOSE SERPL-MCNC: 131 MG/DL (ref 74–99)
HBA1C MFR BLD: 7.4 %
HCT VFR BLD AUTO: 39.5 % (ref 41–52)
HDLC SERPL-MCNC: 30.4 MG/DL
HGB BLD-MCNC: 12.4 G/DL (ref 13.5–17.5)
LDLC SERPL CALC-MCNC: 63 MG/DL
MCH RBC QN AUTO: 29.7 PG (ref 26–34)
MCHC RBC AUTO-ENTMCNC: 31.4 G/DL (ref 32–36)
MCV RBC AUTO: 95 FL (ref 80–100)
MICROALBUMIN UR-MCNC: 45.1 MG/L
MICROALBUMIN/CREAT UR: 25.7 UG/MG CREAT
NON HDL CHOLESTEROL: 85 MG/DL (ref 0–149)
NRBC BLD-RTO: 0 /100 WBCS (ref 0–0)
PLATELET # BLD AUTO: 260 X10*3/UL (ref 150–450)
POTASSIUM SERPL-SCNC: 3.9 MMOL/L (ref 3.5–5.3)
PROT SERPL-MCNC: 6.6 G/DL (ref 6.4–8.2)
PROT UR-ACNC: 56 MG/DL (ref 5–25)
PROT/CREAT UR: 0.32 MG/MG CREAT (ref 0–0.17)
PSA SERPL-MCNC: 0.99 NG/ML
RBC # BLD AUTO: 4.17 X10*6/UL (ref 4.5–5.9)
SODIUM SERPL-SCNC: 142 MMOL/L (ref 136–145)
T3 SERPL-MCNC: 94 NG/DL (ref 60–200)
TRIGL SERPL-MCNC: 109 MG/DL (ref 0–149)
TSH SERPL-ACNC: 2.87 MIU/L (ref 0.44–3.98)
VLDL: 22 MG/DL (ref 0–40)
WBC # BLD AUTO: 5.9 X10*3/UL (ref 4.4–11.3)

## 2024-06-06 PROCEDURE — 83036 HEMOGLOBIN GLYCOSYLATED A1C: CPT

## 2024-06-06 PROCEDURE — 84156 ASSAY OF PROTEIN URINE: CPT

## 2024-06-06 PROCEDURE — 82043 UR ALBUMIN QUANTITATIVE: CPT

## 2024-06-06 PROCEDURE — 82570 ASSAY OF URINE CREATININE: CPT

## 2024-06-06 PROCEDURE — 80053 COMPREHEN METABOLIC PANEL: CPT

## 2024-06-06 PROCEDURE — 80061 LIPID PANEL: CPT

## 2024-06-06 PROCEDURE — 36415 COLL VENOUS BLD VENIPUNCTURE: CPT

## 2024-06-06 PROCEDURE — 84443 ASSAY THYROID STIM HORMONE: CPT

## 2024-06-06 PROCEDURE — 84480 ASSAY TRIIODOTHYRONINE (T3): CPT

## 2024-06-06 PROCEDURE — 84153 ASSAY OF PSA TOTAL: CPT

## 2024-06-06 PROCEDURE — 85027 COMPLETE CBC AUTOMATED: CPT

## 2024-06-12 ENCOUNTER — APPOINTMENT (OUTPATIENT)
Dept: CARDIOLOGY | Facility: CLINIC | Age: 74
End: 2024-06-12
Payer: MEDICARE

## 2024-06-12 VITALS
DIASTOLIC BLOOD PRESSURE: 65 MMHG | SYSTOLIC BLOOD PRESSURE: 130 MMHG | WEIGHT: 264 LBS | OXYGEN SATURATION: 94 % | BODY MASS INDEX: 40.14 KG/M2 | HEART RATE: 71 BPM

## 2024-06-12 DIAGNOSIS — G47.33 OBSTRUCTIVE SLEEP APNEA SYNDROME: ICD-10-CM

## 2024-06-12 DIAGNOSIS — R07.89 CHEST PAIN, ATYPICAL: ICD-10-CM

## 2024-06-12 DIAGNOSIS — I25.10 CORONARY ARTERY CALCIFICATION SEEN ON CAT SCAN: ICD-10-CM

## 2024-06-12 DIAGNOSIS — E78.00 PURE HYPERCHOLESTEROLEMIA: ICD-10-CM

## 2024-06-12 DIAGNOSIS — I10 ESSENTIAL HYPERTENSION: Primary | ICD-10-CM

## 2024-06-12 PROCEDURE — 1036F TOBACCO NON-USER: CPT | Performed by: INTERNAL MEDICINE

## 2024-06-12 PROCEDURE — 3048F LDL-C <100 MG/DL: CPT | Performed by: INTERNAL MEDICINE

## 2024-06-12 PROCEDURE — 1159F MED LIST DOCD IN RCRD: CPT | Performed by: INTERNAL MEDICINE

## 2024-06-12 PROCEDURE — 99213 OFFICE O/P EST LOW 20 MIN: CPT | Performed by: INTERNAL MEDICINE

## 2024-06-12 PROCEDURE — 3051F HG A1C>EQUAL 7.0%<8.0%: CPT | Performed by: INTERNAL MEDICINE

## 2024-06-12 PROCEDURE — 3078F DIAST BP <80 MM HG: CPT | Performed by: INTERNAL MEDICINE

## 2024-06-12 PROCEDURE — 3060F POS MICROALBUMINURIA REV: CPT | Performed by: INTERNAL MEDICINE

## 2024-06-12 PROCEDURE — 3075F SYST BP GE 130 - 139MM HG: CPT | Performed by: INTERNAL MEDICINE

## 2024-06-12 PROCEDURE — 93000 ELECTROCARDIOGRAM COMPLETE: CPT | Performed by: INTERNAL MEDICINE

## 2024-06-12 RX ORDER — TAMSULOSIN HYDROCHLORIDE 0.4 MG/1
CAPSULE ORAL
COMMUNITY

## 2024-06-12 NOTE — PROGRESS NOTES
Chief Complaint:   Follow-up (Patient is here for 3 month follow up)     History Of Present Illness:    Jeffry Lynn is a 73 y.o. male presenting with CV DZ.  No additional CP    Patient denies SOB/palpitations/dizziness/lightheadedness/edema/claudication    Active-walks     Last Recorded Vitals:  Vitals:    06/12/24 1011 06/12/24 1033   BP: 147/75 130/65   BP Location: Left arm    Patient Position: Sitting    BP Cuff Size: Adult    Pulse: 71    SpO2: 94%    Weight: 120 kg (264 lb)             Allergies:  Patient has no known allergies.    Outpatient Medications:  Current Outpatient Medications   Medication Instructions    aspirin 81 mg EC tablet 1 tablet, oral, Daily    atorvastatin (LIPITOR) 40 mg, oral, Daily    blood sugar diagnostic (OneTouch Verio test strips) strip TEST TWICE DAILY AS NEEDED    esomeprazole (NEXIUM) 20 mg, oral, Daily before breakfast, Do not open capsule.    fluticasone (Flonase) 50 mcg/actuation nasal spray 1 spray, Each Nostril, Daily    gabapentin (NEURONTIN) 600 mg, oral, 3 times daily    glimepiride (Amaryl) 2 mg tablet 1 tablet, oral, Daily before breakfast    hydroCHLOROthiazide (HYDRODIURIL) 25 mg, oral, Daily    levothyroxine (SYNTHROID, LEVOXYL) 25 mcg, oral, Daily    loratadine (Claritin) 10 mg tablet 1 tablet, oral, Daily    metFORMIN XR (GLUCOPHAGE-XR) 500 mg, oral, 2 times daily (morning and late afternoon), Do not crush, chew, or split.    metoprolol succinate XL (TOPROL-XL) 50 mg, oral, Daily    multivitamin-iron-folic acid (Centrum)  mg-mcg tablet tablet 1 tablet, oral, Daily    OneTouch Delica Plus Lancet 33 gauge misc TEST TWICE DAILY AND AS NEEDED    OneTouch Verio Flex meter misc use as directed.    pioglitazone (Actos) 45 mg tablet TAKE 1 TABLET BY MOUTH ONCE  DAILY    tamsulosin (Flomax) 0.4 mg 24 hr capsule     topiramate (TOPAMAX) 100 mg, oral, 2 times daily    Trulicity 0.75 mg, subcutaneous, Once Weekly       Physical Exam:  Constitutional:        Appearance: Healthy appearance. Not in distress. Obese.   Neck:      Vascular: No JVR. JVD normal.   Pulmonary:      Effort: Pulmonary effort is normal.      Breath sounds: Normal breath sounds. No wheezing. No rhonchi. No rales.   Chest:      Chest wall: Not tender to palpatation.   Cardiovascular:      PMI at left midclavicular line. Normal rate. Regular rhythm. Normal S1. Normal S2.       Murmurs: There is no murmur.      No gallop.  No click. No rub.   Pulses:     Intact distal pulses.   Edema:     Peripheral edema absent.   Abdominal:      General: Abdomen is protuberant. Bowel sounds are normal.      Palpations: Abdomen is soft.      Tenderness: There is no abdominal tenderness.   Musculoskeletal: Normal range of motion.         General: No tenderness. Skin:     General: Skin is warm and dry.   Neurological:      General: No focal deficit present.      Mental Status: Alert and oriented to person, place and time.          Last Labs:  CBC -  Lab Results   Component Value Date    WBC 5.9 06/06/2024    HGB 12.4 (L) 06/06/2024    HCT 39.5 (L) 06/06/2024    MCV 95 06/06/2024     06/06/2024       CMP -  Lab Results   Component Value Date    CALCIUM 9.5 06/06/2024    PROT 6.6 06/06/2024    ALBUMIN 4.1 06/06/2024    AST 13 06/06/2024    ALT 10 06/06/2024    ALKPHOS 98 06/06/2024    BILITOT 0.6 06/06/2024       LIPID PANEL -   Lab Results   Component Value Date    CHOL 115 06/06/2024    TRIG 109 06/06/2024    HDL 30.4 06/06/2024    CHHDL 3.8 06/06/2024    LDLF 74 03/02/2022    VLDL 22 06/06/2024    NHDL 85 06/06/2024       Ldl=63    RENAL FUNCTION PANEL -   Lab Results   Component Value Date    GLUCOSE 131 (H) 06/06/2024     06/06/2024    K 3.9 06/06/2024     06/06/2024    CO2 27 06/06/2024    ANIONGAP 13 06/06/2024    BUN 28 (H) 06/06/2024    CREATININE 1.50 (H) 06/06/2024    GFRMALE 35 (A) 08/24/2022    CALCIUM 9.5 06/06/2024    ALBUMIN 4.1 06/06/2024 12/2023 CR=1.5    Lab Results   Component  Value Date    BNP 23 04/27/2019    HGBA1C 7.4 (H) 06/06/2024    HGBA1C 6.6 (A) 08/10/2023           Lab review: I have personally reviewed the laboratory result(s)       Problem List Items Addressed This Visit       Chest pain, atypical    Overview     Resolved  12/2023 stress test NL         Coronary artery calcification seen on CAT scan    Overview     Extensive 4V cor calcification per 11/2020 chest CT   Pt with no definite angina.   12/2023 John nuc stress test NL  On ASA / statin / beta blocker   Follow         Essential hypertension - Primary    Overview     BP controlled  No ACE/ARB or increase in hydrochlorothiazide as has renal insuff         Relevant Orders    ECG 12 Lead (Completed)    Obstructive sleep apnea syndrome    Overview     Moderate - wearing CPAP         Hyperlipidemia    Overview     Warrants HI statin with DM / HTN / CAC - on atorvastatin   6/2024 LDL=63          Weight loss  Lipids        Corey Farris, DO

## 2024-07-01 ENCOUNTER — LAB (OUTPATIENT)
Dept: LAB | Facility: LAB | Age: 74
End: 2024-07-01
Payer: MEDICARE

## 2024-07-01 DIAGNOSIS — N40.1 BENIGN PROSTATIC HYPERPLASIA WITH LOWER URINARY TRACT SYMPTOMS: Primary | ICD-10-CM

## 2024-07-01 DIAGNOSIS — N18.32 CHRONIC KIDNEY DISEASE, STAGE 3B (MULTI): ICD-10-CM

## 2024-07-01 DIAGNOSIS — N25.81 SECONDARY HYPERPARATHYROIDISM OF RENAL ORIGIN (MULTI): ICD-10-CM

## 2024-07-01 LAB
25(OH)D3 SERPL-MCNC: 32 NG/ML (ref 30–100)
ALBUMIN SERPL BCP-MCNC: 4.1 G/DL (ref 3.4–5)
ANION GAP SERPL CALC-SCNC: 11 MMOL/L (ref 10–20)
APPEARANCE UR: CLEAR
BILIRUB UR STRIP.AUTO-MCNC: NEGATIVE MG/DL
BUN SERPL-MCNC: 20 MG/DL (ref 6–23)
CALCIUM SERPL-MCNC: 9.2 MG/DL (ref 8.6–10.3)
CHLORIDE SERPL-SCNC: 104 MMOL/L (ref 98–107)
CO2 SERPL-SCNC: 29 MMOL/L (ref 21–32)
COLOR UR: NORMAL
CREAT SERPL-MCNC: 1.62 MG/DL (ref 0.5–1.3)
CREAT UR-MCNC: 116.8 MG/DL (ref 20–370)
EGFRCR SERPLBLD CKD-EPI 2021: 44 ML/MIN/1.73M*2
GLUCOSE SERPL-MCNC: 131 MG/DL (ref 74–99)
GLUCOSE UR STRIP.AUTO-MCNC: NORMAL MG/DL
KETONES UR STRIP.AUTO-MCNC: NEGATIVE MG/DL
LEUKOCYTE ESTERASE UR QL STRIP.AUTO: NEGATIVE
MICROALBUMIN UR-MCNC: 7.1 MG/L
MICROALBUMIN/CREAT UR: 6.1 UG/MG CREAT
NITRITE UR QL STRIP.AUTO: NEGATIVE
PH UR STRIP.AUTO: 6.5 [PH]
PHOSPHATE SERPL-MCNC: 3.9 MG/DL (ref 2.5–4.9)
POTASSIUM SERPL-SCNC: 3.8 MMOL/L (ref 3.5–5.3)
PROT UR STRIP.AUTO-MCNC: NEGATIVE MG/DL
PSA SERPL-MCNC: 0.95 NG/ML
PTH-INTACT SERPL-MCNC: 126.2 PG/ML (ref 18.5–88)
RBC # UR STRIP.AUTO: NEGATIVE /UL
SODIUM SERPL-SCNC: 140 MMOL/L (ref 136–145)
SP GR UR STRIP.AUTO: 1.01
UROBILINOGEN UR STRIP.AUTO-MCNC: NORMAL MG/DL

## 2024-07-01 PROCEDURE — 82570 ASSAY OF URINE CREATININE: CPT

## 2024-07-01 PROCEDURE — 82043 UR ALBUMIN QUANTITATIVE: CPT

## 2024-07-01 PROCEDURE — 82306 VITAMIN D 25 HYDROXY: CPT

## 2024-07-01 PROCEDURE — 80069 RENAL FUNCTION PANEL: CPT

## 2024-07-01 PROCEDURE — 81003 URINALYSIS AUTO W/O SCOPE: CPT

## 2024-07-01 PROCEDURE — 84153 ASSAY OF PSA TOTAL: CPT

## 2024-07-01 PROCEDURE — 36415 COLL VENOUS BLD VENIPUNCTURE: CPT

## 2024-07-01 PROCEDURE — 83970 ASSAY OF PARATHORMONE: CPT

## 2024-07-02 ENCOUNTER — APPOINTMENT (OUTPATIENT)
Dept: PRIMARY CARE | Facility: CLINIC | Age: 74
End: 2024-07-02
Payer: MEDICARE

## 2024-07-02 VITALS
BODY MASS INDEX: 40.16 KG/M2 | SYSTOLIC BLOOD PRESSURE: 138 MMHG | HEART RATE: 57 BPM | OXYGEN SATURATION: 96 % | DIASTOLIC BLOOD PRESSURE: 72 MMHG | HEIGHT: 68 IN | TEMPERATURE: 96.8 F | WEIGHT: 265 LBS

## 2024-07-02 DIAGNOSIS — M79.642 PAIN OF LEFT HAND: ICD-10-CM

## 2024-07-02 DIAGNOSIS — Z00.00 ROUTINE GENERAL MEDICAL EXAMINATION AT HEALTH CARE FACILITY: Primary | ICD-10-CM

## 2024-07-02 DIAGNOSIS — N25.81 HYPERPARATHYROIDISM DUE TO RENAL INSUFFICIENCY (MULTI): ICD-10-CM

## 2024-07-02 DIAGNOSIS — J42 CHRONIC BRONCHITIS, UNSPECIFIED CHRONIC BRONCHITIS TYPE (MULTI): ICD-10-CM

## 2024-07-02 PROBLEM — J01.90 ACUTE SINUSITIS: Status: RESOLVED | Noted: 2024-02-27 | Resolved: 2024-07-02

## 2024-07-02 PROCEDURE — 1158F ADVNC CARE PLAN TLK DOCD: CPT | Performed by: NURSE PRACTITIONER

## 2024-07-02 PROCEDURE — 3078F DIAST BP <80 MM HG: CPT | Performed by: NURSE PRACTITIONER

## 2024-07-02 PROCEDURE — 1159F MED LIST DOCD IN RCRD: CPT | Performed by: NURSE PRACTITIONER

## 2024-07-02 PROCEDURE — 3075F SYST BP GE 130 - 139MM HG: CPT | Performed by: NURSE PRACTITIONER

## 2024-07-02 PROCEDURE — 3061F NEG MICROALBUMINURIA REV: CPT | Performed by: NURSE PRACTITIONER

## 2024-07-02 PROCEDURE — 3048F LDL-C <100 MG/DL: CPT | Performed by: NURSE PRACTITIONER

## 2024-07-02 PROCEDURE — 3051F HG A1C>EQUAL 7.0%<8.0%: CPT | Performed by: NURSE PRACTITIONER

## 2024-07-02 PROCEDURE — G0439 PPPS, SUBSEQ VISIT: HCPCS | Performed by: NURSE PRACTITIONER

## 2024-07-02 PROCEDURE — 1036F TOBACCO NON-USER: CPT | Performed by: NURSE PRACTITIONER

## 2024-07-02 PROCEDURE — 1125F AMNT PAIN NOTED PAIN PRSNT: CPT | Performed by: NURSE PRACTITIONER

## 2024-07-02 PROCEDURE — 1123F ACP DISCUSS/DSCN MKR DOCD: CPT | Performed by: NURSE PRACTITIONER

## 2024-07-02 RX ORDER — RIZATRIPTAN BENZOATE 10 MG/1
TABLET ORAL
COMMUNITY

## 2024-07-02 ASSESSMENT — PAIN SCALES - GENERAL: PAINLEVEL: 8

## 2024-07-02 NOTE — PROGRESS NOTES
"Subjective   Reason for Visit: Jeffry Lynn is an 73 y.o. male here for a Medicare Wellness visit.          Reviewed all medications by prescribing practitioner or clinical pharmacist (such as prescriptions, OTCs, herbal therapies and supplements) and documented in the medical record.    HPI  Jeffry is here for medicare visit  Follows with cardiology and nephrology/endocrinology   Of note: patients wife just , feeling sad/ tearful  Eating /drinking ok   UTD on recent testing   No formal complaints   UTD on eye/ dental visits    C/o left hand pain joint pain     ACP dicussed nephew with take over as POA if medically necsseary     Patient Care Team:  Sarika Fernandez APRN-CNP as PCP - General (Internal Medicine)  Corey Farris DO as Consulting Physician (Cardiology)     Review of Systems    Objective   Vitals:  /72 (BP Location: Left arm, Patient Position: Sitting, BP Cuff Size: Adult)   Pulse 57   Temp 36 °C (96.8 °F) (Temporal)   Ht 1.727 m (5' 8\")   Wt 120 kg (265 lb)   SpO2 96%   BMI 40.29 kg/m²       Physical Exam    Assessment/Plan   1. Pain of left hand  - XR hand left 3+ views; Future    2. Chronic bronchitis, unspecified chronic bronchitis type (Multi)  Stable     3. Hyperparathyroidism due to renal insufficiency (Multi)  Continue with nephology     FU in 2 weeks for depression screening   1 yearly medicare visit        "

## 2024-07-03 ENCOUNTER — HOSPITAL ENCOUNTER (OUTPATIENT)
Dept: RADIOLOGY | Facility: HOSPITAL | Age: 74
Discharge: HOME | End: 2024-07-03
Payer: MEDICARE

## 2024-07-03 DIAGNOSIS — M79.642 PAIN OF LEFT HAND: ICD-10-CM

## 2024-07-03 PROCEDURE — 73130 X-RAY EXAM OF HAND: CPT | Mod: LEFT SIDE | Performed by: RADIOLOGY

## 2024-07-03 PROCEDURE — 73130 X-RAY EXAM OF HAND: CPT | Mod: LT

## 2024-07-07 DIAGNOSIS — M25.532 WRIST PAIN, ACUTE, LEFT: Primary | ICD-10-CM

## 2024-07-10 ENCOUNTER — HOSPITAL ENCOUNTER (OUTPATIENT)
Dept: VASCULAR MEDICINE | Facility: CLINIC | Age: 74
Discharge: HOME | End: 2024-07-10
Payer: MEDICARE

## 2024-07-10 ENCOUNTER — HOSPITAL ENCOUNTER (OUTPATIENT)
Dept: CARDIOLOGY | Facility: CLINIC | Age: 74
Discharge: HOME | End: 2024-07-10
Payer: MEDICARE

## 2024-07-10 VITALS
HEIGHT: 68 IN | WEIGHT: 265 LBS | BODY MASS INDEX: 40.16 KG/M2 | DIASTOLIC BLOOD PRESSURE: 72 MMHG | SYSTOLIC BLOOD PRESSURE: 138 MMHG

## 2024-07-10 DIAGNOSIS — M79.89 LEG SWELLING: ICD-10-CM

## 2024-07-10 DIAGNOSIS — I25.10 CORONARY ARTERY CALCIFICATION SEEN ON CAT SCAN: Primary | ICD-10-CM

## 2024-07-10 DIAGNOSIS — R06.02 SHORTNESS OF BREATH: ICD-10-CM

## 2024-07-10 DIAGNOSIS — E08.59 DIABETES MELLITUS DUE TO UNDERLYING CONDITION WITH OTHER CIRCULATORY COMPLICATIONS (MULTI): ICD-10-CM

## 2024-07-10 DIAGNOSIS — R60.0 LEG EDEMA: ICD-10-CM

## 2024-07-10 LAB
AORTIC VALVE MEAN GRADIENT: 5.4 MMHG
AORTIC VALVE PEAK VELOCITY: 1.49 M/S
AV PEAK GRADIENT: 8.9 MMHG
AVA (PEAK VEL): 2.5 CM2
AVA (VTI): 2.36 CM2
EJECTION FRACTION APICAL 4 CHAMBER: 62.3
EJECTION FRACTION: 69 %
LEFT ATRIUM VOLUME AREA LENGTH INDEX BSA: 29.5 ML/M2
LEFT VENTRICLE INTERNAL DIMENSION DIASTOLE: 4.86 CM (ref 3.5–6)
LEFT VENTRICULAR OUTFLOW TRACT DIAMETER: 2.22 CM
MITRAL VALVE E/A RATIO: 0.72
RIGHT VENTRICLE FREE WALL PEAK S': 1.8 CM/S
TRICUSPID ANNULAR PLANE SYSTOLIC EXCURSION: 3.5 CM

## 2024-07-10 PROCEDURE — 93306 TTE W/DOPPLER COMPLETE: CPT | Performed by: INTERNAL MEDICINE

## 2024-07-10 PROCEDURE — 2500000004 HC RX 250 GENERAL PHARMACY W/ HCPCS (ALT 636 FOR OP/ED): Performed by: INTERNAL MEDICINE

## 2024-07-10 PROCEDURE — 93923 UPR/LXTR ART STDY 3+ LVLS: CPT | Performed by: INTERNAL MEDICINE

## 2024-07-10 PROCEDURE — 93306 TTE W/DOPPLER COMPLETE: CPT

## 2024-07-10 PROCEDURE — 93923 UPR/LXTR ART STDY 3+ LVLS: CPT

## 2024-07-10 RX ORDER — REGADENOSON 0.08 MG/ML
0.4 INJECTION, SOLUTION INTRAVENOUS ONCE
Status: DISCONTINUED | OUTPATIENT
Start: 2024-07-10 | End: 2024-07-10

## 2024-07-12 ENCOUNTER — HOSPITAL ENCOUNTER (OUTPATIENT)
Dept: RADIOLOGY | Facility: HOSPITAL | Age: 74
Discharge: HOME | End: 2024-07-12
Payer: MEDICARE

## 2024-07-12 DIAGNOSIS — M25.532 WRIST PAIN, ACUTE, LEFT: ICD-10-CM

## 2024-07-12 PROCEDURE — 73110 X-RAY EXAM OF WRIST: CPT | Mod: LT

## 2024-07-18 DIAGNOSIS — E08.59 DIABETES MELLITUS DUE TO UNDERLYING CONDITION WITH OTHER CIRCULATORY COMPLICATIONS (MULTI): Primary | ICD-10-CM

## 2024-07-18 RX ORDER — GLIMEPIRIDE 2 MG/1
2 TABLET ORAL
Qty: 90 TABLET | Refills: 3 | Status: SHIPPED | OUTPATIENT
Start: 2024-07-18 | End: 2025-07-13

## 2024-07-26 ENCOUNTER — APPOINTMENT (OUTPATIENT)
Dept: PRIMARY CARE | Facility: CLINIC | Age: 74
End: 2024-07-26
Payer: MEDICARE

## 2024-07-26 VITALS
HEART RATE: 63 BPM | WEIGHT: 266 LBS | BODY MASS INDEX: 40.32 KG/M2 | HEIGHT: 68 IN | DIASTOLIC BLOOD PRESSURE: 70 MMHG | OXYGEN SATURATION: 97 % | SYSTOLIC BLOOD PRESSURE: 130 MMHG

## 2024-07-26 DIAGNOSIS — F43.21 GRIEF: Primary | ICD-10-CM

## 2024-07-26 PROCEDURE — 1123F ACP DISCUSS/DSCN MKR DOCD: CPT | Performed by: NURSE PRACTITIONER

## 2024-07-26 PROCEDURE — 3008F BODY MASS INDEX DOCD: CPT | Performed by: NURSE PRACTITIONER

## 2024-07-26 PROCEDURE — 99213 OFFICE O/P EST LOW 20 MIN: CPT | Performed by: NURSE PRACTITIONER

## 2024-07-26 PROCEDURE — 1158F ADVNC CARE PLAN TLK DOCD: CPT | Performed by: NURSE PRACTITIONER

## 2024-07-26 PROCEDURE — 1159F MED LIST DOCD IN RCRD: CPT | Performed by: NURSE PRACTITIONER

## 2024-07-26 PROCEDURE — 3051F HG A1C>EQUAL 7.0%<8.0%: CPT | Performed by: NURSE PRACTITIONER

## 2024-07-26 PROCEDURE — 1036F TOBACCO NON-USER: CPT | Performed by: NURSE PRACTITIONER

## 2024-07-26 PROCEDURE — 3078F DIAST BP <80 MM HG: CPT | Performed by: NURSE PRACTITIONER

## 2024-07-26 PROCEDURE — 3061F NEG MICROALBUMINURIA REV: CPT | Performed by: NURSE PRACTITIONER

## 2024-07-26 PROCEDURE — 1160F RVW MEDS BY RX/DR IN RCRD: CPT | Performed by: NURSE PRACTITIONER

## 2024-07-26 PROCEDURE — 3048F LDL-C <100 MG/DL: CPT | Performed by: NURSE PRACTITIONER

## 2024-07-26 PROCEDURE — 3075F SYST BP GE 130 - 139MM HG: CPT | Performed by: NURSE PRACTITIONER

## 2024-07-26 ASSESSMENT — ENCOUNTER SYMPTOMS
EYES NEGATIVE: 1
WEAKNESS: 0
CONFUSION: 0
FACIAL ASYMMETRY: 0
RESPIRATORY NEGATIVE: 1
GASTROINTESTINAL NEGATIVE: 1
NEUROLOGICAL NEGATIVE: 1
MUSCULOSKELETAL NEGATIVE: 1
POLYPHAGIA: 0
LIGHT-HEADEDNESS: 0
NERVOUS/ANXIOUS: 0
ALLERGIC/IMMUNOLOGIC NEGATIVE: 1
DIZZINESS: 0
DEPRESSION: 1
HEMATOLOGIC/LYMPHATIC NEGATIVE: 1
CARDIOVASCULAR NEGATIVE: 1
WOUND: 0
CONSTITUTIONAL NEGATIVE: 1
SLEEP DISTURBANCE: 0
ENDOCRINE NEGATIVE: 1
PSYCHIATRIC NEGATIVE: 1

## 2024-07-26 NOTE — PROGRESS NOTES
"Subjective   Patient ID: Jeffry Lynn is a 73 y.o. male who presents for Follow-up (Follow up for ankle, and depression screening ).    VERENICE Gresham is here for up visit   Recently fell: planting flowers down an hill doing ok denies LOC   Ankle looks better   Saw cardiology  no medication changes noted  Renal function stable  Working with  to complete advanced directives   Depression screening completed: no thought of SI or HI   Does not want take any meds   Staying active with family and friends  Eating ok drinking ok   Not sleeping well using Cpap   Review of Systems   Constitutional: Negative.    HENT: Negative.     Eyes: Negative.    Respiratory: Negative.     Cardiovascular: Negative.    Gastrointestinal: Negative.    Endocrine: Negative.  Negative for polyphagia.   Genitourinary: Negative.    Musculoskeletal: Negative.    Skin: Negative.  Negative for pallor, rash and wound.   Allergic/Immunologic: Negative.    Neurological: Negative.  Negative for dizziness, facial asymmetry, weakness and light-headedness.   Hematological: Negative.    Psychiatric/Behavioral: Negative.  Negative for confusion, sleep disturbance and suicidal ideas. The patient is not nervous/anxious.    All other systems reviewed and are negative.      Objective   /70   Pulse 63   Ht 1.727 m (5' 8\")   Wt 121 kg (266 lb)   SpO2 97%   BMI 40.45 kg/m²     Physical Exam  Vitals and nursing note reviewed.   Constitutional:       Appearance: Normal appearance. He is normal weight.   HENT:      Head: Normocephalic.      Nose: Nose normal.      Mouth/Throat:      Mouth: Mucous membranes are moist.   Eyes:      Extraocular Movements: Extraocular movements intact.      Conjunctiva/sclera: Conjunctivae normal.      Pupils: Pupils are equal, round, and reactive to light.   Cardiovascular:      Rate and Rhythm: Normal rate and regular rhythm.      Pulses: Normal pulses.      Heart sounds: Normal heart sounds.   Pulmonary:      Effort: " Pulmonary effort is normal.   Abdominal:      General: Abdomen is flat. Bowel sounds are normal.      Palpations: Abdomen is soft.   Musculoskeletal:         General: Normal range of motion.      Cervical back: Normal range of motion.   Skin:     General: Skin is warm and dry.   Neurological:      General: No focal deficit present.      Mental Status: He is alert and oriented to person, place, and time.   Psychiatric:         Mood and Affect: Mood normal.         Behavior: Behavior normal.         Assessment/Plan   1. Grief  Stable       6 month FU or sooner if problems arise    Patient was identified as a fall risk. Risk prevention instructions provided.

## 2024-07-30 ENCOUNTER — OFFICE VISIT (OUTPATIENT)
Dept: ORTHOPEDIC SURGERY | Facility: CLINIC | Age: 74
End: 2024-07-30
Payer: MEDICARE

## 2024-07-30 VITALS — WEIGHT: 265 LBS | BODY MASS INDEX: 40.16 KG/M2 | HEIGHT: 68 IN

## 2024-07-30 DIAGNOSIS — S63.502A LEFT WRIST SPRAIN, INITIAL ENCOUNTER: Primary | ICD-10-CM

## 2024-07-30 DIAGNOSIS — M25.532 WRIST PAIN, ACUTE, LEFT: ICD-10-CM

## 2024-07-30 PROCEDURE — 3008F BODY MASS INDEX DOCD: CPT | Performed by: ORTHOPAEDIC SURGERY

## 2024-07-30 PROCEDURE — 3061F NEG MICROALBUMINURIA REV: CPT | Performed by: ORTHOPAEDIC SURGERY

## 2024-07-30 PROCEDURE — 3051F HG A1C>EQUAL 7.0%<8.0%: CPT | Performed by: ORTHOPAEDIC SURGERY

## 2024-07-30 PROCEDURE — 1036F TOBACCO NON-USER: CPT | Performed by: ORTHOPAEDIC SURGERY

## 2024-07-30 PROCEDURE — 99213 OFFICE O/P EST LOW 20 MIN: CPT | Performed by: ORTHOPAEDIC SURGERY

## 2024-07-30 PROCEDURE — 3048F LDL-C <100 MG/DL: CPT | Performed by: ORTHOPAEDIC SURGERY

## 2024-07-30 PROCEDURE — 1123F ACP DISCUSS/DSCN MKR DOCD: CPT | Performed by: ORTHOPAEDIC SURGERY

## 2024-07-30 PROCEDURE — L3908 WHO COCK-UP NONMOLDE PRE OTS: HCPCS | Performed by: ORTHOPAEDIC SURGERY

## 2024-07-30 PROCEDURE — 1159F MED LIST DOCD IN RCRD: CPT | Performed by: ORTHOPAEDIC SURGERY

## 2024-07-30 ASSESSMENT — PATIENT HEALTH QUESTIONNAIRE - PHQ9
1. LITTLE INTEREST OR PLEASURE IN DOING THINGS: NOT AT ALL
10. IF YOU CHECKED OFF ANY PROBLEMS, HOW DIFFICULT HAVE THESE PROBLEMS MADE IT FOR YOU TO DO YOUR WORK, TAKE CARE OF THINGS AT HOME, OR GET ALONG WITH OTHER PEOPLE: VERY DIFFICULT
SUM OF ALL RESPONSES TO PHQ9 QUESTIONS 1 AND 2: 1
2. FEELING DOWN, DEPRESSED OR HOPELESS: SEVERAL DAYS

## 2024-07-30 NOTE — PROGRESS NOTES
History of Present Illness:   Patient with history of bilateral total knee arthroplasty presents today for evaluation of left wrist pain.  The patient denies any significant issues with the wrist but did have a fall recently.  He states he has some discomfort in the wrist but also some catching occasionally and some swelling in his index and long finger.    Denies significant neurologic symptoms has a history of carpal tunnel release on that side    Review of Systems   GENERAL: Negative for malaise, significant weight loss, fever  MUSCULOSKELETAL: see HPI  NEURO:  Negative    Physical Examination:  Left wrist:  Skin healthy and intact  No gross swelling or ecchymosis    Volar flexion, dorsiflexion, pronation/supination without limitation  Mild tenderness to palpation over distal radius and radiocarpal joint  No tenderness to palpation over distal ulna or TFCC  No tenderness to palpation over the scaphoid  Negative piano key sign  Negative Finkelstein test  Negative Díaz's test    Neurovascular exam normal distally      Imaging:  X-rays demonstrate some mild widening of the scapholunate interval, possible old dorsal triquetral fracture, mild arthritis    Assessment:   Patient with likely scapholunate ligament injury, mild osteoarthritis of the hand as well as a possible old avulsion fracture and early trigger finger left index and long    Plan:  Patient unable to take anti-inflammatories due to his renal insufficiency.  We discussed occupational therapy and continued home exercises for the trigger fingers.  If those symptoms worsen could consider corticosteroid injections.    Regarding his wrist discussed wrist splint to be worn removable brace as needed for the next 4 to 6 weeks.  If symptoms worsen consider referral to specialist.

## 2024-08-12 ENCOUNTER — OFFICE VISIT (OUTPATIENT)
Dept: PRIMARY CARE | Facility: CLINIC | Age: 74
End: 2024-08-12
Payer: MEDICARE

## 2024-08-12 VITALS
BODY MASS INDEX: 39.84 KG/M2 | DIASTOLIC BLOOD PRESSURE: 68 MMHG | OXYGEN SATURATION: 98 % | SYSTOLIC BLOOD PRESSURE: 130 MMHG | HEART RATE: 64 BPM | WEIGHT: 262 LBS

## 2024-08-12 DIAGNOSIS — M79.89 LEG SWELLING: Primary | ICD-10-CM

## 2024-08-12 DIAGNOSIS — L97.929 ULCER OF LEFT LOWER EXTREMITY, UNSPECIFIED ULCER STAGE (MULTI): ICD-10-CM

## 2024-08-12 PROCEDURE — 1036F TOBACCO NON-USER: CPT | Performed by: STUDENT IN AN ORGANIZED HEALTH CARE EDUCATION/TRAINING PROGRAM

## 2024-08-12 PROCEDURE — 3048F LDL-C <100 MG/DL: CPT | Performed by: STUDENT IN AN ORGANIZED HEALTH CARE EDUCATION/TRAINING PROGRAM

## 2024-08-12 PROCEDURE — 3051F HG A1C>EQUAL 7.0%<8.0%: CPT | Performed by: STUDENT IN AN ORGANIZED HEALTH CARE EDUCATION/TRAINING PROGRAM

## 2024-08-12 PROCEDURE — 3075F SYST BP GE 130 - 139MM HG: CPT | Performed by: STUDENT IN AN ORGANIZED HEALTH CARE EDUCATION/TRAINING PROGRAM

## 2024-08-12 PROCEDURE — 1158F ADVNC CARE PLAN TLK DOCD: CPT | Performed by: STUDENT IN AN ORGANIZED HEALTH CARE EDUCATION/TRAINING PROGRAM

## 2024-08-12 PROCEDURE — 3078F DIAST BP <80 MM HG: CPT | Performed by: STUDENT IN AN ORGANIZED HEALTH CARE EDUCATION/TRAINING PROGRAM

## 2024-08-12 PROCEDURE — 3061F NEG MICROALBUMINURIA REV: CPT | Performed by: STUDENT IN AN ORGANIZED HEALTH CARE EDUCATION/TRAINING PROGRAM

## 2024-08-12 PROCEDURE — 99214 OFFICE O/P EST MOD 30 MIN: CPT | Performed by: STUDENT IN AN ORGANIZED HEALTH CARE EDUCATION/TRAINING PROGRAM

## 2024-08-12 PROCEDURE — 1159F MED LIST DOCD IN RCRD: CPT | Performed by: STUDENT IN AN ORGANIZED HEALTH CARE EDUCATION/TRAINING PROGRAM

## 2024-08-12 PROCEDURE — 1123F ACP DISCUSS/DSCN MKR DOCD: CPT | Performed by: STUDENT IN AN ORGANIZED HEALTH CARE EDUCATION/TRAINING PROGRAM

## 2024-08-12 RX ORDER — FUROSEMIDE 20 MG/1
20 TABLET ORAL DAILY
Qty: 5 TABLET | Refills: 0 | Status: SHIPPED | OUTPATIENT
Start: 2024-08-12 | End: 2025-08-12

## 2024-08-12 ASSESSMENT — PATIENT HEALTH QUESTIONNAIRE - PHQ9
1. LITTLE INTEREST OR PLEASURE IN DOING THINGS: NOT AT ALL
2. FEELING DOWN, DEPRESSED OR HOPELESS: NOT AT ALL
SUM OF ALL RESPONSES TO PHQ9 QUESTIONS 1 AND 2: 0
2. FEELING DOWN, DEPRESSED OR HOPELESS: NOT AT ALL
SUM OF ALL RESPONSES TO PHQ9 QUESTIONS 1 AND 2: 0
1. LITTLE INTEREST OR PLEASURE IN DOING THINGS: NOT AT ALL

## 2024-08-12 ASSESSMENT — ENCOUNTER SYMPTOMS: DEPRESSION: 0

## 2024-08-12 NOTE — PROGRESS NOTES
Subjective   Patient ID: Jeffry Lynn is a 73 y.o. male who presents for Follow-up (Sore/LT lower leg/Has a wound/ulcer ).    HPI     Pt presents with ulcer/wound on left lower leg. He noticed the ulcer last Friday, 8/09. States that the ulcer is painful, erythematous, non-pruritic. Patient described an injury he had on Mar 31st in the same area, says that it was swollen and bruised for 2 months. Thinks that arterial damage may be causing ulcer.     Review of Systems   All other systems reviewed and are negative.      Objective   /68 (BP Location: Right arm, Patient Position: Sitting, BP Cuff Size: Large adult)   Pulse 64   Wt 119 kg (262 lb)   SpO2 98%   BMI 39.84 kg/m²     Physical Exam  Constitutional:       Appearance: Normal appearance.   HENT:      Head: Normocephalic and atraumatic.      Right Ear: Tympanic membrane and ear canal normal.      Left Ear: Tympanic membrane and ear canal normal.      Mouth/Throat:      Mouth: Mucous membranes are moist.      Pharynx: Oropharynx is clear.   Eyes:      Extraocular Movements: Extraocular movements intact.      Conjunctiva/sclera: Conjunctivae normal.      Pupils: Pupils are equal, round, and reactive to light.   Cardiovascular:      Rate and Rhythm: Normal rate and regular rhythm.      Pulses: Normal pulses.      Heart sounds: Normal heart sounds.   Pulmonary:      Effort: Pulmonary effort is normal.      Breath sounds: Normal breath sounds.   Abdominal:      General: Abdomen is flat. Bowel sounds are normal.      Palpations: Abdomen is soft.   Musculoskeletal:         General: Normal range of motion.      Cervical back: Normal range of motion and neck supple.   Skin:     General: Skin is warm and dry.      Capillary Refill: Capillary refill takes 2 to 3 seconds.   Neurological:      General: No focal deficit present.      Mental Status: He is alert and oriented to person, place, and time. Mental status is at baseline.   Psychiatric:         Mood and  Affect: Mood normal.         Behavior: Behavior normal.         Thought Content: Thought content normal.         Judgment: Judgment normal.         Assessment/Plan   1. Leg swelling    - furosemide (Lasix) 20 mg tablet; Take 1 tablet (20 mg) by mouth once daily.  Dispense: 5 tablet; Refill: 0  - some lasix go get swelling down    2. Diabetic leg ulcer  - ulcer on leg with clean mt, no redness or puss  - wound care  - follow up in 1 week

## 2024-08-16 NOTE — PROGRESS NOTES
"       Occupational Therapy Evaluation    Patient Name:  Jeffry Lynn   Patient MRN: [id number]  Date: 8/20/2024                              ASSESSMENT:  Patient was referred to occupational therapy for an evaluation and treatment s/p ***. OT evaluation completed this date.  Patient's main functional deficits include *** .  Patient would benefit from skilled OT in order to increase ***  Patient was issued written and illustrated handouts for *** for HEP to address these deficits. Patient verbalizes good understanding of HEP.    PLAN:       OT intervention plan includes: education/instruction, home program, manual therapy, therapeutic activities, therapeutic exercises, and ***.  Frequency and duration: *** time(s) a week, for *** weeks.   Potential to achieve rehab goals is ***.    Plan of care was developed with input and agreement by the patient.     Insurance:  Visit number: 1 of ***  Insurance Type: Payor: [employer] / Plan: [employer] MEDICARE / Product Type: *No Product type* /   Authorization or Plan of Care date Range:   Copay:  Referred by: Zia Lawrence MD     SUBJECTIVE:  Patient is a 73 old who attends OT evaluation today accompanied by ***. he reports he had been experiencing L wrist pain for several weeks. Notes no significant injury but did have a fall recently. Patient had a consult with orthopaedic surgery who conducted x-rays indicating \"some mild widening of the scapholunate interval, possible old dorsal triquetral fracture, mild arthritis.\" Patient also experiences some \"catching\" and swelling of the index and long finger. Orthopaedic prescribed patient an anti-inflammatory, provided patient with OT orders, provided him with a removable wrist brae, and educated patient on trigger finger exercises.     he is ***HD   his chief complaint is ***.  his goal for Occupational Therapy is to ***     he lives with *** in a ***. he works *** time as a ***.    General:  Reason for " "visit: ***  Referred by: ***    Type of surgery: No surgery found  Date of surgery: No surgery found  Days since surgery: No surgery found        Current Problem:         Problem List Items Addressed This Visit    None      Medical Screening:       Reviewed medical history form with patient and medical screening assessed.        Medical History Form scanned into chart    Precautions:         Fall Risk: {Fall Risk:50024::\"None\"}         Denies: {talprecautions:27429}        Past Surgical history:        Past Medical history: {talprecautions:52236}        Pain Assessment:      Pain Assessment: 0-10      Pain (0-10): {TJP Pain 0-10:56233}       Pain Location L wrist and digits    OBJECTIVE:  Active range of motion:  L Wrist:  - Flexion: ***  - Extension: ***  - Rd dev: ***  - Ul dev: ***    L 2nd Digit:  - MCP ext: ***  - MCP flex: ***  - PIP ext: ***  - PIP flex: ***  - DIP ext: ***  - DIP flex: ***    L 3rd Digit:  - MCP ext: ***  - MCP flex: ***  - PIP ext: ***  - PIP flex: ***  - DIP ext: ***  - DIP flex: ***    Strength (MMT):  L Wrist:  - Flexion: ***/5  - Extension: ***/5  - Rd Dev. ***/5  - Ul Dev. ***/5     strength:  - RUE : ***  - LUE : ***    Pinch Strength:  - Lateral:       - RUE: ***       - LUE: ***  - 3 Jaw:       - RUE: ***       - LUE: ***    Outcome Measures:  OT Adult Other Outcome Measures:   9 Hole Peg Test: R: ***      L: ***  {OT Outcome Measures:12138}  (***%)      Goals:  Patient will present with a pain score of ***/10 in ***.    Patient to increase *** AROM by *** degrees in order to improve independent performance in daily activities.     Patient will improve ***  strength by ***lb to improve performance in lifting and grasping tasks.     Patient to improve *** hand coordination on 9 hole peg test to *** for dexterity tasks such as ***    Patient to improve QuickDASH score to at or below 20% to increase independency in ADLs and IADLs.     Patient will demonstrate full *** " "ROM to complete ADLs/IADLs independently by discharge.     Patient will report understanding of home program, demonstrate independence and verbalize precautions.     Patient will report follow through with wearing of orthosis as instructed by therapist.     Patient will report pain free use of hand for completing ADLs/IADLS by discharge.     Patient's scar will be supple and demonstrate good healing that does not limit function by discharge.     Treatment Performed: (\"NP\" = Not Performed)     Treatment:  Low Complex OT Eval    Therapeutic Exercise:  -   Therapeutic Activities:   -   Manual Therapy:  -   Neuromuscular Re-Education:   -   Modalities:   -     Education: Reviewed home exercise program.  "

## 2024-08-19 ENCOUNTER — APPOINTMENT (OUTPATIENT)
Dept: PRIMARY CARE | Facility: CLINIC | Age: 74
End: 2024-08-19
Payer: MEDICARE

## 2024-08-19 ENCOUNTER — LAB (OUTPATIENT)
Dept: LAB | Facility: LAB | Age: 74
End: 2024-08-19
Payer: MEDICARE

## 2024-08-19 VITALS
OXYGEN SATURATION: 97 % | HEART RATE: 63 BPM | WEIGHT: 260 LBS | DIASTOLIC BLOOD PRESSURE: 68 MMHG | SYSTOLIC BLOOD PRESSURE: 132 MMHG | BODY MASS INDEX: 39.53 KG/M2

## 2024-08-19 DIAGNOSIS — E03.9 HYPOTHYROIDISM, UNSPECIFIED: ICD-10-CM

## 2024-08-19 DIAGNOSIS — E78.5 HYPERLIPIDEMIA, UNSPECIFIED: ICD-10-CM

## 2024-08-19 DIAGNOSIS — L97.929 ULCER OF LEFT LOWER EXTREMITY, UNSPECIFIED ULCER STAGE (MULTI): Primary | ICD-10-CM

## 2024-08-19 DIAGNOSIS — E11.9 TYPE 2 DIABETES MELLITUS WITHOUT COMPLICATIONS (MULTI): Primary | ICD-10-CM

## 2024-08-19 LAB
ALBUMIN SERPL BCP-MCNC: 4.1 G/DL (ref 3.4–5)
ALP SERPL-CCNC: 103 U/L (ref 33–136)
ALT SERPL W P-5'-P-CCNC: 10 U/L (ref 10–52)
ANION GAP SERPL CALC-SCNC: 14 MMOL/L (ref 10–20)
AST SERPL W P-5'-P-CCNC: 14 U/L (ref 9–39)
BILIRUB SERPL-MCNC: 0.7 MG/DL (ref 0–1.2)
BUN SERPL-MCNC: 27 MG/DL (ref 6–23)
CALCIUM SERPL-MCNC: 9.8 MG/DL (ref 8.6–10.6)
CHLORIDE SERPL-SCNC: 101 MMOL/L (ref 98–107)
CHOLEST SERPL-MCNC: 119 MG/DL (ref 0–199)
CHOLESTEROL/HDL RATIO: 3.4
CO2 SERPL-SCNC: 30 MMOL/L (ref 21–32)
CREAT SERPL-MCNC: 1.7 MG/DL (ref 0.5–1.3)
CREAT UR-MCNC: 97.1 MG/DL (ref 20–370)
EGFRCR SERPLBLD CKD-EPI 2021: 42 ML/MIN/1.73M*2
EST. AVERAGE GLUCOSE BLD GHB EST-MCNC: 171 MG/DL
GLUCOSE SERPL-MCNC: 141 MG/DL (ref 74–99)
HBA1C MFR BLD: 7.6 %
HDLC SERPL-MCNC: 34.5 MG/DL
LDLC SERPL CALC-MCNC: 55 MG/DL
MICROALBUMIN UR-MCNC: 7.6 MG/L
MICROALBUMIN/CREAT UR: 7.8 UG/MG CREAT
NON HDL CHOLESTEROL: 85 MG/DL (ref 0–149)
POTASSIUM SERPL-SCNC: 3.5 MMOL/L (ref 3.5–5.3)
PROT SERPL-MCNC: 6.9 G/DL (ref 6.4–8.2)
SODIUM SERPL-SCNC: 141 MMOL/L (ref 136–145)
TRIGL SERPL-MCNC: 147 MG/DL (ref 0–149)
TSH SERPL-ACNC: 4.05 MIU/L (ref 0.44–3.98)
VLDL: 29 MG/DL (ref 0–40)

## 2024-08-19 PROCEDURE — 3075F SYST BP GE 130 - 139MM HG: CPT | Performed by: STUDENT IN AN ORGANIZED HEALTH CARE EDUCATION/TRAINING PROGRAM

## 2024-08-19 PROCEDURE — 1159F MED LIST DOCD IN RCRD: CPT | Performed by: STUDENT IN AN ORGANIZED HEALTH CARE EDUCATION/TRAINING PROGRAM

## 2024-08-19 PROCEDURE — 3078F DIAST BP <80 MM HG: CPT | Performed by: STUDENT IN AN ORGANIZED HEALTH CARE EDUCATION/TRAINING PROGRAM

## 2024-08-19 PROCEDURE — 36415 COLL VENOUS BLD VENIPUNCTURE: CPT

## 2024-08-19 PROCEDURE — 82043 UR ALBUMIN QUANTITATIVE: CPT

## 2024-08-19 PROCEDURE — 84443 ASSAY THYROID STIM HORMONE: CPT

## 2024-08-19 PROCEDURE — 80061 LIPID PANEL: CPT

## 2024-08-19 PROCEDURE — 3061F NEG MICROALBUMINURIA REV: CPT | Performed by: STUDENT IN AN ORGANIZED HEALTH CARE EDUCATION/TRAINING PROGRAM

## 2024-08-19 PROCEDURE — 82570 ASSAY OF URINE CREATININE: CPT

## 2024-08-19 PROCEDURE — 99213 OFFICE O/P EST LOW 20 MIN: CPT | Performed by: STUDENT IN AN ORGANIZED HEALTH CARE EDUCATION/TRAINING PROGRAM

## 2024-08-19 PROCEDURE — 1036F TOBACCO NON-USER: CPT | Performed by: STUDENT IN AN ORGANIZED HEALTH CARE EDUCATION/TRAINING PROGRAM

## 2024-08-19 PROCEDURE — 80053 COMPREHEN METABOLIC PANEL: CPT

## 2024-08-19 PROCEDURE — 83036 HEMOGLOBIN GLYCOSYLATED A1C: CPT

## 2024-08-19 PROCEDURE — 3051F HG A1C>EQUAL 7.0%<8.0%: CPT | Performed by: STUDENT IN AN ORGANIZED HEALTH CARE EDUCATION/TRAINING PROGRAM

## 2024-08-19 PROCEDURE — 3048F LDL-C <100 MG/DL: CPT | Performed by: STUDENT IN AN ORGANIZED HEALTH CARE EDUCATION/TRAINING PROGRAM

## 2024-08-19 PROCEDURE — 1123F ACP DISCUSS/DSCN MKR DOCD: CPT | Performed by: STUDENT IN AN ORGANIZED HEALTH CARE EDUCATION/TRAINING PROGRAM

## 2024-08-19 ASSESSMENT — ENCOUNTER SYMPTOMS: DEPRESSION: 0

## 2024-08-19 NOTE — PROGRESS NOTES
Occupational Therapy Evaluation    Patient Name:  Jeffry Lynn   Patient MRN: [id number]  Date: 8/20/2024  Time Calculation  Start Time: 0935  Stop Time: 1020  Time Calculation (min): 45 min    OT Evaluation Time Entry  OT Evaluation (Low) Time Entry: 15  OT Therapeutic Procedures Time Entry  Therapeutic Exercise Time Entry: 15  OT Modalities Time Entry  Whirlpool Time Entry: 15                ASSESSMENT:  Patient was referred to occupational therapy for an evaluation and treatment s/p left wrist sprain. OT evaluation completed this date.  Patient's main functional deficits include unable to open jars, difficulty lifting and carrying objects in left, pain with weight bearing and difficulty with meal prep/knife and fork.  Patient would benefit from skilled OT in order to increase left wrist AROM, left  and pinch strength, and reduce pain levels to increase functional use of LUE.  Patient was issued written and illustrated handouts for wrist AROM, tendon gliding, heat v cold and fingerless isotoner glove for edema control for HEP to address these deficits. Patient verbalizes good understanding of HEP.    PLAN:       OT intervention plan includes: education/instruction, home program, manual therapy, therapeutic activities, therapeutic exercises, splinting, ultrasound and fluidotherapy.  Frequency and duration: 1-2 time(s) a week, for 4-6 weeks.   Potential to achieve rehab goals is good.    Plan of care was developed with input and agreement by the patient.     Insurance:  Visit number: 1  Insurance Type: Payor: [employer]IC[employer] / Plan: [employer] MEDICARE / Product Type: *No Product type* /   Authorization or Plan of Care date Range: med necessity 100% covered  Copay: NA  Referred by: Zia Lawrence MD     SUBJECTIVE:  Patient is a 73 year old who attends OT evaluation today. he reports  he had been experiencing L wrist pain for several weeks. Notes no significant injury but did have a  "fall recently. Patient had a consult with orthopaedic surgery who conducted x-rays indicating \"some mild widening of the scapholunate interval, possible old dorsal triquetral fracture, mild arthritis.\" Patient also experiences some \"catching\" and swelling of the index and long finger. Orthopaedic prescribed patient an anti-inflammatory (patient unable to take due to renal insufficiency), provided patient with OT orders, provided him with a removable wrist brace, and educated patient on trigger finger exercises.     he is RHD   his chief complaint is pain and triggering of hand/digits.  his goal for Occupational Therapy is to reduce pain and swelling and increase use of left hand.     he is a  and is retired from [employer].    General:  Reason for visit: left wrist sprain (S-L ligament injury)  Referred by: Dr. Zia Lawrence    Type of surgery: No surgery found  Date of surgery: No surgery found  Days since surgery: No surgery found        Current Problem:         Problem List Items Addressed This Visit             ICD-10-CM    Wrist pain, acute, left - Primary M25.532    Relevant Orders    Follow Up In Occupational Therapy     Other Visit Diagnoses         Codes    Left wrist sprain, initial encounter     S63.502A            Medical Screening:       Reviewed medical history form with patient and medical screening assessed.        Medical History Form scanned into chart    Precautions:         Fall Risk: None         Denies: Cancer and Pacemaker        Past Surgical history: NA        Past Medical history: DM II, HBP, headaches, kidney disease        Pain Assessment:      Pain Assessment: 0-10      Pain (0-10): 7       Pain Location left wrist    OBJECTIVE:  Active range of motion:  left Elbow:  - Flexion: WNL  - Extension: WNL  - Supination: wnl  - Pronation: wnl    left Wrist:  - Flexion: 30  - Extension: 60  - Rd dev: 15  - Ul dev: 25    II Digit:  Limitations in MP flexion     Strength:  Right  " "60#  Left  35# with pain  Right lat pinch 15#  Left lat pinch 13.5#  Right 3pt pinch 12#  Left 3pt pinch 11# with pain    Outcome Measures:  OT Adult Other Outcome Measures  Other Outcome Measures: Quick Dash 35  (54.55%)      Goals:  Patient will present with a pain score of 1/10 in left wrist and hand.    Patient to increase left wrist flexion AROM by 10 degrees in order to improve independent performance in daily activities.     Patient will improve left  strength by 15lb to improve performance in lifting and grasping tasks.     Patient to improve QuickDASH score to at or below 20% to increase independency in ADLs and IADLs.      Patient will report understanding of home program, demonstrate independence and verbalize precautions.     Patient will report follow through with wearing of orthosis as instructed by therapist.      Treatment Performed: (\"NP\" = Not Performed)     Treatment:  Low Complex OT Eval 15 min    Therapeutic Exercise: 15 mn  - HEP: wrist AROM and tendon gliding  - education on splint wearing schedle  - education on heat v cold  - fit and train on fingerless isotoner glove  Therapeutic Activities:   -   Manual Therapy:  -   Neuromuscular Re-Education:   -   Modalities: 15 min  - fluidotherapy with AROM    Education: Home exercise program instructed and issued.  "

## 2024-08-19 NOTE — PROGRESS NOTES
Subjective   Patient ID: Jeffry Lynn is a 73 y.o. male who presents for Follow-up (LT lower leg ulcer/He thinks its getting better).    HPI     Woudn check, ucler on shin has healed, no scab. Thin skin applied no signs of infection    Review of Systems   All other systems reviewed and are negative.      Objective   /68 (BP Location: Right arm, Patient Position: Sitting, BP Cuff Size: Large adult)   Pulse 63   Wt 118 kg (260 lb)   SpO2 97%   BMI 39.53 kg/m²     Physical Exam  Constitutional:       Appearance: Normal appearance.   HENT:      Head: Normocephalic and atraumatic.      Right Ear: Tympanic membrane and ear canal normal.      Left Ear: Tympanic membrane and ear canal normal.      Mouth/Throat:      Mouth: Mucous membranes are moist.      Pharynx: Oropharynx is clear.   Eyes:      Extraocular Movements: Extraocular movements intact.      Conjunctiva/sclera: Conjunctivae normal.      Pupils: Pupils are equal, round, and reactive to light.   Cardiovascular:      Rate and Rhythm: Normal rate and regular rhythm.      Pulses: Normal pulses.      Heart sounds: Normal heart sounds.   Pulmonary:      Effort: Pulmonary effort is normal.      Breath sounds: Normal breath sounds.   Abdominal:      General: Abdomen is flat. Bowel sounds are normal.      Palpations: Abdomen is soft.   Musculoskeletal:         General: Normal range of motion.      Cervical back: Normal range of motion and neck supple.   Skin:     General: Skin is warm and dry.      Capillary Refill: Capillary refill takes 2 to 3 seconds.   Neurological:      General: No focal deficit present.      Mental Status: He is alert and oriented to person, place, and time. Mental status is at baseline.   Psychiatric:         Mood and Affect: Mood normal.         Behavior: Behavior normal.         Thought Content: Thought content normal.         Judgment: Judgment normal.         Assessment/Plan   1. Ulcer of left lower extremity, unspecified ulcer  stage (Multi)  Healed no issues  Keep dry

## 2024-08-20 ENCOUNTER — EVALUATION (OUTPATIENT)
Dept: OCCUPATIONAL THERAPY | Facility: CLINIC | Age: 74
End: 2024-08-20
Payer: MEDICARE

## 2024-08-20 DIAGNOSIS — M25.532 WRIST PAIN, ACUTE, LEFT: Primary | ICD-10-CM

## 2024-08-20 DIAGNOSIS — S63.502A LEFT WRIST SPRAIN, INITIAL ENCOUNTER: ICD-10-CM

## 2024-08-20 PROCEDURE — 97110 THERAPEUTIC EXERCISES: CPT | Mod: GO | Performed by: OCCUPATIONAL THERAPIST

## 2024-08-20 PROCEDURE — 97022 WHIRLPOOL THERAPY: CPT | Mod: GO | Performed by: OCCUPATIONAL THERAPIST

## 2024-08-20 PROCEDURE — 97165 OT EVAL LOW COMPLEX 30 MIN: CPT | Mod: GO | Performed by: OCCUPATIONAL THERAPIST

## 2024-08-20 ASSESSMENT — ENCOUNTER SYMPTOMS
DEPRESSION: 0
OCCASIONAL FEELINGS OF UNSTEADINESS: 0
LOSS OF SENSATION IN FEET: 0

## 2024-08-20 ASSESSMENT — PAIN - FUNCTIONAL ASSESSMENT: PAIN_FUNCTIONAL_ASSESSMENT: 0-10

## 2024-08-20 ASSESSMENT — PAIN SCALES - GENERAL: PAINLEVEL_OUTOF10: 7

## 2024-08-22 NOTE — PROGRESS NOTES
OCCUPATIONAL THERAPY TREATMENT NOTE    Patient Name:  Jeffry Lynn   Patient MRN: [id number]  Date: 2024  Time Calculation  Start Time: 842  Stop Time: 925  Time Calculation (min): 43 min    Insurance:  Visit number: 2  Insurance Type: Payor: [facility] MEDICARE / Plan: [employer] MEDICARE / Product Type: *No Product type* /   Authorization or Plan of Care date Range: med necessity 100% covered   Copay:NA  Referred by: Zia Lawrence MD        OT Therapeutic Procedures Time Entry  Manual Therapy Time Entry: 15  Therapeutic Exercise Time Entry: 3  OT Modalities Time Entry  Ultrasound Time Entry: 10  Whirlpool Time Entry: 15                  General:  Reason for visit: left wrist sprain (S-L ligament injury)  Referred by: Dr. Zia Lawrence    Type of surgery: No surgery found  Date of surgery: No surgery found  Days since surgery: No surgery found    Current Problem:         Problem List Items Addressed This Visit             ICD-10-CM    Wrist pain, acute, left M25.532       Assessment:  Progress towards functional goals: Reduce swelling  Response to interventions:  Palpable nodules present at A1 pulley of left III and IV digits with tenderness.  Good fit with Oval 8 splints to decrease triggering and patient verbalized good understanding of wearing schedule and precautions.  Justification for continued skilled care: To address remaining functional, objective and subjective deficits to allow them to return to full independence with ADLs. Assess effectiveness of HEP. Modify and progress home exercise program. Reduce pain to improve function. Reduce swelling to improve joint ROM and reduce muscular inhibition.    Plan:  Therapeutic exercises to strengthen left UE for functional use in daily activities. , Continued education of techniques such as heat to decrease joint stiffness and muscle tightness., Manual therapy to  muscle  "tightness and improve joint mobility. , and Modalities as needed to address symptoms.    Subjective:  Patient reports he is unable to perform the tendon glides due to triggering in the III and V digit     Progress towards functional goal:  Reduce swelling and Reduce pain level  Pain Location left wrist  Pain (0-10): 5   HEP adherence / understanding: compliance with the instructed home exercises.    Precautions:  Fall Risk: None    Precautions listed: none    Therapy diagnoses:   1. Wrist pain, acute, left  Follow Up In Occupational Therapy           Objective:  Measured 8/20/24    Treatment Performed: (\"NP\" = Not Performed)     Therapeutic Exercise: 3 min  - Oval 8 splint fitting for night wear to decrease triggering III, IV  Therapeutic Activities:   -   Manual Therapy: 15 min  - joint traction and stretches to all digits in the left hand  Neuromuscular Re-Education:   -   Modalities: 25 min  - fluidotherapy with AROM 15 min  - US to dorsal wrist- 3Mhz, 1.0W/cm2, 10 min continuous    Education: Added oval 8 splints for night wear exercise(s) to HEP program  "

## 2024-08-23 ENCOUNTER — TREATMENT (OUTPATIENT)
Dept: OCCUPATIONAL THERAPY | Facility: CLINIC | Age: 74
End: 2024-08-23
Payer: MEDICARE

## 2024-08-23 DIAGNOSIS — M25.532 WRIST PAIN, ACUTE, LEFT: ICD-10-CM

## 2024-08-23 PROCEDURE — 97022 WHIRLPOOL THERAPY: CPT | Mod: GO | Performed by: OCCUPATIONAL THERAPIST

## 2024-08-23 PROCEDURE — 97140 MANUAL THERAPY 1/> REGIONS: CPT | Mod: GO | Performed by: OCCUPATIONAL THERAPIST

## 2024-08-23 PROCEDURE — 97035 APP MDLTY 1+ULTRASOUND EA 15: CPT | Mod: GO | Performed by: OCCUPATIONAL THERAPIST

## 2024-08-29 ENCOUNTER — TREATMENT (OUTPATIENT)
Dept: OCCUPATIONAL THERAPY | Facility: CLINIC | Age: 74
End: 2024-08-29
Payer: MEDICARE

## 2024-08-29 DIAGNOSIS — M25.532 WRIST PAIN, ACUTE, LEFT: ICD-10-CM

## 2024-08-29 PROCEDURE — 97035 APP MDLTY 1+ULTRASOUND EA 15: CPT | Mod: GO | Performed by: OCCUPATIONAL THERAPIST

## 2024-08-29 PROCEDURE — 97140 MANUAL THERAPY 1/> REGIONS: CPT | Mod: GO | Performed by: OCCUPATIONAL THERAPIST

## 2024-08-29 PROCEDURE — 97022 WHIRLPOOL THERAPY: CPT | Mod: GO | Performed by: OCCUPATIONAL THERAPIST

## 2024-08-29 NOTE — PROGRESS NOTES
OCCUPATIONAL THERAPY TREATMENT NOTE    Patient Name:  Jeffry Lynn   Patient MRN: [id number]  Date: 2024  Time Calculation  Start Time: 0830  Stop Time: 15  Time Calculation (min): 45 min    Insurance:  Visit number: 3  Insurance Type: Payor: [facility] MEDICARE / Plan: [employer] MEDICARE / Product Type: *No Product type* /   Authorization or Plan of Care date Range: med necessity 100% covered   Copay:NA  Referred by: Zia Lawrence MD        OT Therapeutic Procedures Time Entry  Manual Therapy Time Entry: 15  Therapeutic Exercise Time Entry: 5  OT Modalities Time Entry  Ultrasound Time Entry: 10  Whirlpool Time Entry: 15                  General:  Reason for visit: left wrist sprain (S-L ligament injury)  Referred by: Dr. Zia Lawrence    Type of surgery: No surgery found  Date of surgery: No surgery found  Days since surgery: No surgery found    Current Problem:         Problem List Items Addressed This Visit             ICD-10-CM    Wrist pain, acute, left M25.532         Assessment:  Progress towards functional goals: Reduce swelling  Response to interventions:  Increased pain and tenderness with manual techniques over III A1 pulley.  OT instructed patient in contrast bath for edema reduction in left hand.  Patient verbalized good understanding.  Justification for continued skilled care: To address remaining functional, objective and subjective deficits to allow them to return to full independence with ADLs. Assess effectiveness of HEP. Modify and progress home exercise program. Reduce pain to improve function. Reduce swelling to improve joint ROM and reduce muscular inhibition.    Plan:  Therapeutic exercises to strengthen left UE for functional use in daily activities. , Continued education of techniques such as heat to decrease joint stiffness and muscle tightness., Manual therapy to  muscle tightness and improve joint mobility. ,  "and Modalities as needed to address symptoms.    Subjective:  Patient reports he has been wearing the Oval 8 splints and he has not had triggering while he sleeps.      Progress towards functional goal:  Reduce swelling and Reduce pain level  Pain Location left wrist  Pain (0-10): 5   HEP adherence / understanding: compliance with the instructed home exercises.    Precautions:  Fall Risk: None    Precautions listed: none    Therapy diagnoses:   1. Wrist pain, acute, left  Follow Up In Occupational Therapy             Objective:  Measured 8/20/24    Treatment Performed: (\"NP\" = Not Performed)     Therapeutic Exercise: 5 min  -contrast bath instruction  Therapeutic Activities:   -   Manual Therapy: 15 min  - joint traction and stretches to all digits in the left hand  -soft tissue mobilization to volar MP  Neuromuscular Re-Education:   -   Modalities: 25 min  - fluidotherapy with AROM 15 min  - US to dorsal wrist- 3Mhz, 1.0W/cm2, 10 min continuous    Education: Added oval 8 splints for night wear exercise(s) to HEP program  "

## 2024-08-29 NOTE — PROGRESS NOTES
OCCUPATIONAL THERAPY TREATMENT NOTE    Patient Name:  Jeffry Lynn   Patient MRN: [id number]  Date: 2024  Time Calculation  Start Time: 0845  Stop Time: 930  Time Calculation (min): 45 min    Insurance:  Visit number: 4  Insurance Type: Payor: [facility] MEDICARE / Plan: [employer] MEDICARE / Product Type: *No Product type* /   Authorization or Plan of Care date Range: med necessity 100% covered   Copay:NA  Referred by: Zia Lawrence MD        OT Therapeutic Procedures Time Entry  Manual Therapy Time Entry: 20  OT Modalities Time Entry  Ultrasound Time Entry: 10  Whirlpool Time Entry: 15                  General:  Reason for visit: left wrist sprain (S-L ligament injury)  Referred by: Dr. Zia Lawrence    Type of surgery: No surgery found  Date of surgery: No surgery found  Days since surgery: No surgery found    Current Problem:         Problem List Items Addressed This Visit             ICD-10-CM    Wrist pain, acute, left M25.532           Assessment:  Progress towards functional goals: Reduce swelling  Response to interventions:  Decreased tenderness and decreased edema in volar surface of hand after completion of manual techniques and modalities this session.  Patient will continue to wear Oval 8 splints while out of town.  Justification for continued skilled care: To address remaining functional, objective and subjective deficits to allow them to return to full independence with ADLs. Assess effectiveness of HEP. Modify and progress home exercise program. Reduce pain to improve function. Reduce swelling to improve joint ROM and reduce muscular inhibition.    Plan:  Therapeutic exercises to strengthen left UE for functional use in daily activities. , Continued education of techniques such as heat to decrease joint stiffness and muscle tightness., Manual therapy to  muscle tightness and improve joint mobility. , and Modalities as  "needed to address symptoms.  Patient to go out of town for several weeks to visit children.  Will call when returns to re-schedule OT appts.    Subjective:  Patient reports he has not had any incidents of triggering in the left hand since last visit.      Progress towards functional goal:  Reduce swelling and Reduce pain level  Pain Location left wrist/hand  Pain (0-10): 1 arthritis pain    HEP adherence / understanding: compliance with the instructed home exercises.    Precautions:  Fall Risk: None    Precautions listed: none    Therapy diagnoses:   1. Wrist pain, acute, left  Follow Up In Occupational Therapy               Objective:  Measured 8/20/24    Treatment Performed: (\"NP\" = Not Performed)     Therapeutic Exercise: NP  Therapeutic Activities:   -   Manual Therapy: 20 min  - joint traction and stretches to all digits in the left hand  -soft tissue mobilization to volar MP  Neuromuscular Re-Education:   -   Modalities: 25 min  - fluidotherapy with AROM 15 min  - US to dorsal wrist- 3Mhz, 1.0W/cm2, 10 min continuous    Education: Reviewed home exercise program.  "

## 2024-08-30 ENCOUNTER — TREATMENT (OUTPATIENT)
Dept: OCCUPATIONAL THERAPY | Facility: CLINIC | Age: 74
End: 2024-08-30
Payer: MEDICARE

## 2024-08-30 DIAGNOSIS — M25.532 WRIST PAIN, ACUTE, LEFT: ICD-10-CM

## 2024-08-30 PROCEDURE — 97035 APP MDLTY 1+ULTRASOUND EA 15: CPT | Mod: GO | Performed by: OCCUPATIONAL THERAPIST

## 2024-08-30 PROCEDURE — 97022 WHIRLPOOL THERAPY: CPT | Mod: GO | Performed by: OCCUPATIONAL THERAPIST

## 2024-08-30 PROCEDURE — 97140 MANUAL THERAPY 1/> REGIONS: CPT | Mod: GO | Performed by: OCCUPATIONAL THERAPIST

## 2024-09-05 RX ORDER — OLMESARTAN MEDOXOMIL 20 MG/1
20 TABLET ORAL DAILY
COMMUNITY
Start: 2024-09-05

## 2024-09-05 NOTE — TELEPHONE ENCOUNTER
Spoke with pt and he does not think he is on olmesartan  Will call back if refill is needed  According to fill date [facility] it was 12-5-23

## 2024-09-30 ENCOUNTER — TREATMENT (OUTPATIENT)
Dept: OCCUPATIONAL THERAPY | Facility: CLINIC | Age: 74
End: 2024-09-30
Payer: MEDICARE

## 2024-09-30 DIAGNOSIS — M25.532 WRIST PAIN, ACUTE, LEFT: ICD-10-CM

## 2024-09-30 PROCEDURE — 97140 MANUAL THERAPY 1/> REGIONS: CPT | Mod: GO | Performed by: OCCUPATIONAL THERAPIST

## 2024-09-30 PROCEDURE — 97035 APP MDLTY 1+ULTRASOUND EA 15: CPT | Mod: GO | Performed by: OCCUPATIONAL THERAPIST

## 2024-09-30 PROCEDURE — 97022 WHIRLPOOL THERAPY: CPT | Mod: GO | Performed by: OCCUPATIONAL THERAPIST

## 2024-09-30 NOTE — PROGRESS NOTES
OCCUPATIONAL THERAPY TREATMENT NOTE    Patient Name:  Jeffry Lynn   Patient MRN: [id number]  Date: 9/30/2024  Time Calculation  Start Time: 0435  Stop Time: 0515  Time Calculation (min): 40 min    Insurance:  Visit number: 5  Insurance Type: Payor: [employer] / Plan: [employer] MEDICARE / Product Type: *No Product type* /   Authorization or Plan of Care date Range: med necessity 100% covered   Copay:NA  Referred by: Zia Lawrence MD        OT Therapeutic Procedures Time Entry  Manual Therapy Time Entry: 10  Therapeutic Exercise Time Entry: 5  OT Modalities Time Entry  Ultrasound Time Entry: 10  Whirlpool Time Entry: 15                  General:  Reason for visit: left wrist sprain (S-L ligament injury)  Referred by: Dr. Zia Lawrence    Type of surgery: No surgery found  Date of surgery: No surgery found  Days since surgery: No surgery found    Current Problem:         Problem List Items Addressed This Visit             ICD-10-CM    Wrist pain, acute, left M25.532    Relevant Orders    Follow Up In Occupational Therapy             Assessment:  Progress towards functional goals: Reduce swelling  Response to interventions:  Increased active wrist flexion and extension with no complaints of pain since initial evaluation.  Minimal changes in  and pinch strength, however, no compaints of pain with use of dynamometer or pinch gauge.  Jeffry would benefit from additional skilled OT to increase strength and reduce pain levels for improved home management tasks.  Justification for continued skilled care: To address remaining functional, objective and subjective deficits to allow them to return to full independence with ADLs. Assess effectiveness of HEP. Modify and progress home exercise program. Reduce pain to improve function. Reduce swelling to improve joint ROM and reduce muscular inhibition.    Plan:  Therapeutic exercises to strengthen left  "UE for functional use in daily activities. , Continued education of techniques such as heat to decrease joint stiffness and muscle tightness., Manual therapy to  muscle tightness and improve joint mobility. , and Modalities as needed to address symptoms.  Continue 1-2x/week for 4 weeks    Subjective:  Patient returns to OT after several weeks out of town visiting family. Jeffry reports his main complaint is achiness in the left hand.  He has had no incidents of trigger finger since last OT visit.      Progress towards functional goal:  Reduce swelling and Reduce pain level  Pain Location left wrist/hand  Pain (0-10): 1 arthritis pain    HEP adherence / understanding: compliance with the instructed home exercises.    Precautions:  Fall Risk: None    Precautions listed: none    Therapy diagnoses:   1. Wrist pain, acute, left  Follow Up In Occupational Therapy    Follow Up In Occupational Therapy              Objective:  Measured 24  left Wrist:  - Flexion: 55  - Extension: 65  - Rd dev: 15  - Ul dev: 25     II Digit:  Limitations in MP flexion     Strength:  Right  60#  Left  35#   Right lat pinch 15#  Left lat pinch 11#  Right 3pt pinch 12#  Left 3pt pinch 11# no pain    Treatment Performed: (\"NP\" = Not Performed)     Therapeutic Exercise: 5 min  -objective measures of wrist AROM and /pinch strength  Therapeutic Activities: NP  -   Manual Therapy: 10 min  - joint traction and stretches to all digits in the left hand  -soft tissue mobilization to volar MP  Neuromuscular Re-Education:   -   Modalities: 25 min  - fluidotherapy with AROM 15 min  - US to dorsal wrist- 3Mhz, 1.0W/cm2, 10 min continuous    Education: Reviewed home exercise program.    Goals:  Patient will present with a pain score of 1/10 in left wrist and hand.   Goal progressing 24  Patient to increase left wrist flexion AROM by 10 degrees in order to improve independent performance in daily activities.  Goal achieved " 9/30/24     Patient will improve left  strength by 15lb to improve performance in lifting and grasping tasks.  Goal progressing 9/30/24     Patient to improve QuickDASH score to at or below 20% to increase independency in ADLs and IADLs.  Goal progressing 9/30/24     Patient will report understanding of home program, demonstrate independence and verbalize precautions. Goal progressing 9/30/24     Patient will report follow through with wearing of orthosis as instructed by therapist.Goal achieved 9/30/24

## 2024-10-02 NOTE — PROGRESS NOTES
OCCUPATIONAL THERAPY TREATMENT NOTE    Patient Name:  Jeffry Lynn   Patient MRN: [id number]  Date: 10/3/2024  Time Calculation  Start Time: 0200  Stop Time: 0240  Time Calculation (min): 40 min    Insurance:  Visit number: 6  Insurance Type: Payor: [facility] MEDICARE / Plan: [employer] MEDICARE / Product Type: *No Product type* /   Authorization or Plan of Care date Range: med necessity 100% covered   Copay:NA  Referred by: Zia Lawrence MD        OT Therapeutic Procedures Time Entry  Manual Therapy Time Entry: 15  OT Modalities Time Entry  Ultrasound Time Entry: 10  Whirlpool Time Entry: 15                  General:  Reason for visit: left wrist sprain (S-L ligament injury)  Referred by: Dr. Zia Lawrence    Type of surgery: No surgery found  Date of surgery: No surgery found  Days since surgery: No surgery found    Current Problem:         Problem List Items Addressed This Visit             ICD-10-CM    Wrist pain, acute, left M25.532               Assessment:  Progress towards functional goals: Reduce swelling  Response to interventions:  After manual techniques and modalities, patient was able to make a composite fist in the left hand without pain or triggering.  Tightness in the PIP and DIP joints of the left III digit with edema present in digit..  Justification for continued skilled care: To address remaining functional, objective and subjective deficits to allow them to return to full independence with ADLs. Assess effectiveness of HEP. Modify and progress home exercise program. Reduce pain to improve function. Reduce swelling to improve joint ROM and reduce muscular inhibition.    Plan:  Therapeutic exercises to strengthen left UE for functional use in daily activities. , Continued education of techniques such as heat to decrease joint stiffness and muscle tightness., Manual therapy to  muscle tightness and improve joint mobility. , and Modalities as  "needed to address symptoms.  Continue 1-2x/week for 4 weeks    Subjective:  Patient reports hands are achey today.      Progress towards functional goal:  Reduce swelling and Reduce pain level  Pain Location left wrist/hand  Pain (0-10): 1 arthritis pain    HEP adherence / understanding: compliance with the instructed home exercises.    Precautions:  Fall Risk: None    Precautions listed: none    Therapy diagnoses:   1. Wrist pain, acute, left  Follow Up In Occupational Therapy          Objective:  Measured 8/20/24, 9/30/24    Treatment Performed: (\"NP\" = Not Performed)     Therapeutic Exercise: NP  Therapeutic Activities: NP  -   Manual Therapy: 15 min  - joint traction and stretches to all digits in the left hand  -soft tissue mobilization to volar MP  Neuromuscular Re-Education:   -   Modalities: 25 min  - fluidotherapy with AROM 15 min  - US to dorsal wrist- 3Mhz, 1.0W/cm2, 10 min continuous    Education: Reviewed home exercise program.    "

## 2024-10-03 ENCOUNTER — TREATMENT (OUTPATIENT)
Dept: OCCUPATIONAL THERAPY | Facility: CLINIC | Age: 74
End: 2024-10-03
Payer: MEDICARE

## 2024-10-03 DIAGNOSIS — M25.532 WRIST PAIN, ACUTE, LEFT: ICD-10-CM

## 2024-10-03 PROCEDURE — 97022 WHIRLPOOL THERAPY: CPT | Mod: GO | Performed by: OCCUPATIONAL THERAPIST

## 2024-10-03 PROCEDURE — 97140 MANUAL THERAPY 1/> REGIONS: CPT | Mod: GO | Performed by: OCCUPATIONAL THERAPIST

## 2024-10-03 PROCEDURE — 97035 APP MDLTY 1+ULTRASOUND EA 15: CPT | Mod: GO | Performed by: OCCUPATIONAL THERAPIST

## 2024-10-07 ENCOUNTER — TREATMENT (OUTPATIENT)
Dept: OCCUPATIONAL THERAPY | Facility: CLINIC | Age: 74
End: 2024-10-07
Payer: MEDICARE

## 2024-10-07 DIAGNOSIS — M25.532 WRIST PAIN, ACUTE, LEFT: ICD-10-CM

## 2024-10-07 PROCEDURE — 97140 MANUAL THERAPY 1/> REGIONS: CPT | Mod: GO | Performed by: OCCUPATIONAL THERAPIST

## 2024-10-07 PROCEDURE — 97022 WHIRLPOOL THERAPY: CPT | Mod: GO | Performed by: OCCUPATIONAL THERAPIST

## 2024-10-07 PROCEDURE — 97035 APP MDLTY 1+ULTRASOUND EA 15: CPT | Mod: GO | Performed by: OCCUPATIONAL THERAPIST

## 2024-10-07 NOTE — PROGRESS NOTES
OCCUPATIONAL THERAPY TREATMENT NOTE    Patient Name:  Jeffry Lynn   Patient MRN: [id number]  Date: 10/7/2024  Time Calculation  Start Time: 345  Stop Time: 0430  Time Calculation (min): 45 min    Insurance:  Visit number: 7  Insurance Type: Payor: [facility] MEDICARE / Plan: [employer] MEDICARE / Product Type: *No Product type* /   Authorization or Plan of Care date Range: med necessity 100% covered   Copay:NA  Referred by: Zia Lawrence MD        OT Therapeutic Procedures Time Entry  Manual Therapy Time Entry: 15  OT Modalities Time Entry  Ultrasound Time Entry: 10  Whirlpool Time Entry: 15                  General:  Reason for visit: left wrist sprain (S-L ligament injury)  Referred by: Dr. Zia Lawrecne    Type of surgery: No surgery found  Date of surgery: No surgery found  Days since surgery: No surgery found    Current Problem:         Problem List Items Addressed This Visit             ICD-10-CM    Wrist pain, acute, left M25.532                 Assessment:  Progress towards functional goals: Reduce swelling  Response to interventions:  After modalities and manual techniques this visit, patient's pain levels decreased and and range of motion increased both passively and actively.  Justification for continued skilled care: To address remaining functional, objective and subjective deficits to allow them to return to full independence with ADLs. Assess effectiveness of HEP. Modify and progress home exercise program. Reduce pain to improve function. Reduce swelling to improve joint ROM and reduce muscular inhibition.    Plan:  Therapeutic exercises to strengthen left UE for functional use in daily activities. , Continued education of techniques such as heat to decrease joint stiffness and muscle tightness., Manual therapy to  muscle tightness and improve joint mobility. , and Modalities as needed to address symptoms.  Continue 1-2x/week for 4  "weeks    Subjective:  Patient reports pain in the top of his hand began this morning at a level of 4/10.  He also reports having difficulty straightening this middle finger.      Progress towards functional goal:  Reduce swelling and Reduce pain level  Pain Location left wrist/hand  Pain (0-10): 1 arthritis pain    HEP adherence / understanding: compliance with the instructed home exercises.    Precautions:  Fall Risk: None    Precautions listed: none    Therapy diagnoses:   1. Wrist pain, acute, left  Follow Up In Occupational Therapy            Objective:  Measured 8/20/24, 9/30/24    Treatment Performed: (\"NP\" = Not Performed)     Therapeutic Exercise: NP  Therapeutic Activities: NP  -   Manual Therapy: 20 min  - joint traction and stretches to all digits in the left hand  -composite fist with wrist flex stretches  Neuromuscular Re-Education:   -   Modalities: 25 min  - fluidotherapy with AROM 15 min  - US to dorsal wrist- 3Mhz, 1.0W/cm2, 10 min continuous    Education: Reviewed home exercise program.    "

## 2024-10-08 DIAGNOSIS — I10 ESSENTIAL HYPERTENSION: ICD-10-CM

## 2024-10-08 DIAGNOSIS — I25.10 CORONARY ARTERY CALCIFICATION SEEN ON CAT SCAN: ICD-10-CM

## 2024-10-09 RX ORDER — METOPROLOL SUCCINATE 50 MG/1
50 TABLET, EXTENDED RELEASE ORAL DAILY
Qty: 90 TABLET | Refills: 3 | Status: SHIPPED | OUTPATIENT
Start: 2024-11-04

## 2024-10-10 ENCOUNTER — TREATMENT (OUTPATIENT)
Dept: OCCUPATIONAL THERAPY | Facility: CLINIC | Age: 74
End: 2024-10-10
Payer: MEDICARE

## 2024-10-10 DIAGNOSIS — M25.532 WRIST PAIN, ACUTE, LEFT: ICD-10-CM

## 2024-10-10 PROCEDURE — 97035 APP MDLTY 1+ULTRASOUND EA 15: CPT | Mod: GO | Performed by: OCCUPATIONAL THERAPIST

## 2024-10-10 PROCEDURE — 97140 MANUAL THERAPY 1/> REGIONS: CPT | Mod: GO | Performed by: OCCUPATIONAL THERAPIST

## 2024-10-10 PROCEDURE — 97022 WHIRLPOOL THERAPY: CPT | Mod: GO | Performed by: OCCUPATIONAL THERAPIST

## 2024-10-10 NOTE — PROGRESS NOTES
OCCUPATIONAL THERAPY TREATMENT NOTE    Patient Name:  Jeffry Lynn   Patient MRN: [id number]  Date: 10/10/2024  Time Calculation  Start Time: 0200  Stop Time: 0240  Time Calculation (min): 40 min    Insurance:  Visit number: 8  Insurance Type: Payor: [facility] MEDICARE / Plan: [employer] MEDICARE / Product Type: *No Product type* /   Authorization or Plan of Care date Range: med necessity 100% covered   Copay:NA  Referred by: Zia Lawrence MD        OT Therapeutic Procedures Time Entry  Manual Therapy Time Entry: 15  OT Modalities Time Entry  Ultrasound Time Entry: 10  Whirlpool Time Entry: 15                  General:  Reason for visit: left wrist sprain (S-L ligament injury)  Referred by: Dr. Zia Lawrence    Type of surgery: No surgery found  Date of surgery: No surgery found  Days since surgery: No surgery found    Current Problem:         Problem List Items Addressed This Visit             ICD-10-CM    Wrist pain, acute, left M25.532       Assessment:  Progress towards functional goals: Reduce swelling  Response to interventions:  After manual techniques and modalities, edema in left hand MF is decreased.  Patient verbalized good understanding of coban wrap wearing instructions and precautions.  Justification for continued skilled care: To address remaining functional, objective and subjective deficits to allow them to return to full independence with ADLs. Assess effectiveness of HEP. Modify and progress home exercise program. Reduce pain to improve function. Reduce swelling to improve joint ROM and reduce muscular inhibition.    Plan:  Therapeutic exercises to strengthen left UE for functional use in daily activities. , Continued education of techniques such as heat to decrease joint stiffness and muscle tightness., Manual therapy to  muscle tightness and improve joint mobility. , and Modalities as needed to address symptoms.  Continue 1-2x/week for 4  "weeks    Subjective:  Patient reports middle finger is very swollen today and he is unsure the reason.      Progress towards functional goal:  Reduce swelling and Reduce pain level  Pain Location left wrist/hand  Pain (0-10): 1 arthritis pain    HEP adherence / understanding: compliance with the instructed home exercises.    Precautions:  Fall Risk: None    Precautions listed: none    Therapy diagnoses:   1. Wrist pain, acute, left  Follow Up In Occupational Therapy              Objective:  Measured 8/20/24, 9/30/24    Treatment Performed: (\"NP\" = Not Performed)     Therapeutic Exercise: NP  Therapeutic Activities: NP  -   Manual Therapy: 15 min  - joint traction and stretches to all digits in the left hand  -composite fist with wrist flex stretches  -instruction in coban wrap for MF edema  Neuromuscular Re-Education:   -   Modalities: 25 min  - fluidotherapy with AROM 15 min  - US to dorsal wrist- 3Mhz, 1.0W/cm2, 10 min continuous    Education:  Instructed patient in coban wrap for edema control of digits.    "

## 2024-10-14 ENCOUNTER — TREATMENT (OUTPATIENT)
Dept: OCCUPATIONAL THERAPY | Facility: CLINIC | Age: 74
End: 2024-10-14
Payer: MEDICARE

## 2024-10-14 DIAGNOSIS — M25.532 WRIST PAIN, ACUTE, LEFT: ICD-10-CM

## 2024-10-14 PROCEDURE — 97022 WHIRLPOOL THERAPY: CPT | Mod: GO | Performed by: OCCUPATIONAL THERAPIST

## 2024-10-14 PROCEDURE — 97140 MANUAL THERAPY 1/> REGIONS: CPT | Mod: GO | Performed by: OCCUPATIONAL THERAPIST

## 2024-10-14 PROCEDURE — 97035 APP MDLTY 1+ULTRASOUND EA 15: CPT | Mod: GO | Performed by: OCCUPATIONAL THERAPIST

## 2024-10-14 NOTE — PROGRESS NOTES
OCCUPATIONAL THERAPY TREATMENT NOTE    Patient Name:  Jeffry Lynn   Patient MRN: [id number]  Date: 10/14/2024  Time Calculation  Start Time: 0430  Stop Time: 0510  Time Calculation (min): 40 min    Insurance:  Visit number: 9  Insurance Type: Payor: [employer] MEDICARE / Plan: [employer] MEDICARE / Product Type: *No Product type* /   Authorization or Plan of Care date Range: med necessity 100% covered   Copay:NA  Referred by: Zia Lawrence MD        OT Therapeutic Procedures Time Entry  Manual Therapy Time Entry: 15  OT Modalities Time Entry  Ultrasound Time Entry: 10  Whirlpool Time Entry: 15                  General:  Reason for visit: left wrist sprain (S-L ligament injury)  Referred by: Dr. Zia Lawrence    Type of surgery: No surgery found  Date of surgery: No surgery found  Days since surgery: No surgery found    Current Problem:         Problem List Items Addressed This Visit             ICD-10-CM    Wrist pain, acute, left M25.532         Assessment:  Progress towards functional goals: Reduce swelling  Response to interventions:  Limitations in active and passive flexion of PIP and DIP joint in III digit due to edema this visit.  Improved passive flexion into composite fisting at end of treatment session.  OT wrapped III digit in coban at completion of treatment session and instructed patient to continue with nightly wrapping.  Justification for continued skilled care: To address remaining functional, objective and subjective deficits to allow them to return to full independence with ADLs. Assess effectiveness of HEP. Modify and progress home exercise program. Reduce pain to improve function. Reduce swelling to improve joint ROM and reduce muscular inhibition.    Plan:  Therapeutic exercises to strengthen left UE for functional use in daily activities. , Continued education of techniques such as heat to decrease joint stiffness and muscle tightness., Manual  "therapy to  muscle tightness and improve joint mobility. , and Modalities as needed to address symptoms.  Continue 1-2x/week for 4 weeks    Subjective:  Patient reports he has been wrapping the MF in coban but it is still swollen and painful when making a fist.       Progress towards functional goal:  Reduce swelling and Reduce pain level  Pain Location left wrist/hand  Pain (0-10): 1 arthritis pain    HEP adherence / understanding: compliance with the instructed home exercises.    Precautions:  Fall Risk: None    Precautions listed: none    Therapy diagnoses:   1. Wrist pain, acute, left  Follow Up In Occupational Therapy          Objective:  Measured 24, 24    Treatment Performed: (\"NP\" = Not Performed)     Therapeutic Exercise: NP  Therapeutic Activities: NP  -   Manual Therapy: 15 min  - joint traction and stretches to all digits in the left hand  -composite fist with wrist flex stretches  -instruction in coban wrap for MF edema  Neuromuscular Re-Education:   -   Modalities: 25 min  - fluidotherapy with AROM 15 min  - US to dorsal wrist- 3Mhz, 1.0W/cm2, 10 min continuous    Education:  Instructed patient in coban wrap for edema control of digits.    "

## 2024-10-16 NOTE — PROGRESS NOTES
OCCUPATIONAL THERAPY TREATMENT NOTE    Patient Name:  Jeffry Lynn   Patient MRN: [id number]  Date: 10/17/2024  Time Calculation  Start Time: 340  Stop Time: 420  Time Calculation (min): 40 min    Insurance:  Visit number: 10  Insurance Type: Payor: [facility] MEDICARE / Plan: [employer] MEDICARE / Product Type: *No Product type* /   Authorization or Plan of Care date Range: med necessity 100% covered   Copay:NA  Referred by: Zia Lawrence MD        OT Therapeutic Procedures Time Entry  Manual Therapy Time Entry: 15  OT Modalities Time Entry  Ultrasound Time Entry: 10  Whirlpool Time Entry: 15                  General:  Reason for visit: left wrist sprain (S-L ligament injury)  Referred by: Dr. Zia Lawrence    Type of surgery: No surgery found  Date of surgery: No surgery found  Days since surgery: No surgery found    Current Problem:         Problem List Items Addressed This Visit             ICD-10-CM    Wrist pain, acute, left M25.532           Assessment:  Progress towards functional goals: Reduce swelling  Response to interventions:  Full passive flexion achieved with OT stretches this visit and no complaints of pain.  Re-assessment NV to determine if additional OT recommended.  Justification for continued skilled care: To address remaining functional, objective and subjective deficits to allow them to return to full independence with ADLs. Assess effectiveness of HEP. Modify and progress home exercise program. Reduce pain to improve function. Reduce swelling to improve joint ROM and reduce muscular inhibition.    Plan:  Therapeutic exercises to strengthen left UE for functional use in daily activities. , Continued education of techniques such as heat to decrease joint stiffness and muscle tightness., Manual therapy to  muscle tightness and improve joint mobility. , and Modalities as needed to address symptoms.  Re-assessment next  "week    Subjective:  Patient reports continued improvements in MF swelling with use of coban wrap daily..       Progress towards functional goal:  Reduce swelling and Reduce pain level  Pain Location left wrist/hand  Pain (0-10): 1 arthritis pain    HEP adherence / understanding: compliance with the instructed home exercises.    Precautions:  Fall Risk: None    Precautions listed: none    Therapy diagnoses:   1. Wrist pain, acute, left  Follow Up In Occupational Therapy            Objective:  Measured 8/20/24, 9/30/24    Treatment Performed: (\"NP\" = Not Performed)     Therapeutic Exercise: NP  Therapeutic Activities: NP  -   Manual Therapy: 15 min  - joint traction and stretches to all digits in the left hand  -composite fist with wrist flex stretches  -instruction in coban wrap for MF edema  Neuromuscular Re-Education:   -   Modalities: 25 min  - fluidotherapy with AROM 15 min  - US to dorsal wrist- 3Mhz, 1.0W/cm2, 10 min continuous    Education:  Instructed patient in coban wrap for edema control of digits.    "

## 2024-10-17 ENCOUNTER — TREATMENT (OUTPATIENT)
Dept: OCCUPATIONAL THERAPY | Facility: CLINIC | Age: 74
End: 2024-10-17
Payer: MEDICARE

## 2024-10-17 DIAGNOSIS — M25.532 WRIST PAIN, ACUTE, LEFT: ICD-10-CM

## 2024-10-17 PROCEDURE — 97035 APP MDLTY 1+ULTRASOUND EA 15: CPT | Mod: GO | Performed by: OCCUPATIONAL THERAPIST

## 2024-10-17 PROCEDURE — 97022 WHIRLPOOL THERAPY: CPT | Mod: GO | Performed by: OCCUPATIONAL THERAPIST

## 2024-10-17 PROCEDURE — 97140 MANUAL THERAPY 1/> REGIONS: CPT | Mod: GO | Performed by: OCCUPATIONAL THERAPIST

## 2024-10-19 DIAGNOSIS — I25.10 CORONARY ARTERY CALCIFICATION SEEN ON CAT SCAN: ICD-10-CM

## 2024-10-19 DIAGNOSIS — E78.00 PURE HYPERCHOLESTEROLEMIA: ICD-10-CM

## 2024-10-19 DIAGNOSIS — E78.5 DYSLIPIDEMIA: ICD-10-CM

## 2024-10-21 ENCOUNTER — TREATMENT (OUTPATIENT)
Dept: OCCUPATIONAL THERAPY | Facility: CLINIC | Age: 74
End: 2024-10-21
Payer: MEDICARE

## 2024-10-21 DIAGNOSIS — M25.532 WRIST PAIN, ACUTE, LEFT: ICD-10-CM

## 2024-10-21 PROCEDURE — 97140 MANUAL THERAPY 1/> REGIONS: CPT | Mod: GO | Performed by: OCCUPATIONAL THERAPIST

## 2024-10-21 PROCEDURE — 97022 WHIRLPOOL THERAPY: CPT | Mod: GO | Performed by: OCCUPATIONAL THERAPIST

## 2024-10-21 PROCEDURE — 97035 APP MDLTY 1+ULTRASOUND EA 15: CPT | Mod: GO | Performed by: OCCUPATIONAL THERAPIST

## 2024-10-21 RX ORDER — ATORVASTATIN CALCIUM 40 MG/1
40 TABLET, FILM COATED ORAL DAILY
Qty: 90 TABLET | Refills: 2 | Status: SHIPPED | OUTPATIENT
Start: 2024-11-14

## 2024-10-21 NOTE — PROGRESS NOTES
OCCUPATIONAL THERAPY TREATMENT NOTE    Patient Name:  Jeffry Lynn   Patient MRN: [id number]  Date: 10/21/2024  Time Calculation  Start Time: 345  Stop Time: 425  Time Calculation (min): 40 min    Insurance:  Visit number: 11  Insurance Type: Payor: [facility] MEDICARE / Plan: [employer] MEDICARE / Product Type: *No Product type* /   Authorization or Plan of Care date Range: med necessity 100% covered   Copay:NA  Referred by: Zia Lawrence MD        OT Therapeutic Procedures Time Entry  Manual Therapy Time Entry: 15  OT Modalities Time Entry  Ultrasound Time Entry: 10  Whirlpool Time Entry: 15                  General:  Reason for visit: left wrist sprain (S-L ligament injury)  Referred by: Dr. Zia Lawrence    Type of surgery: No surgery found  Date of surgery: No surgery found  Days since surgery: No surgery found    Current Problem:         Problem List Items Addressed This Visit             ICD-10-CM    Wrist pain, acute, left M25.532             Assessment:  Progress towards functional goals: Reduce swelling  Response to interventions:  Visible and palpable edema in dorsal wrist and into first dorsal compartment upon arrival to OT session.  Decreased pain and decreased edema with manual edema mobilization and modalities.  Patient pain levels reduced to 6/10.  Justification for continued skilled care: To address remaining functional, objective and subjective deficits to allow them to return to full independence with ADLs. Assess effectiveness of HEP. Modify and progress home exercise program. Reduce pain to improve function. Reduce swelling to improve joint ROM and reduce muscular inhibition.    Plan:  Therapeutic exercises to strengthen left UE for functional use in daily activities. , Continued education of techniques such as heat to decrease joint stiffness and muscle tightness., Manual therapy to  muscle tightness and improve joint mobility. , and  "Modalities as needed to address symptoms.  Re-assessment next visit    Subjective:  Patient reports increased pain and swelling in the left wrist and hand since assembling an outdoor tent on his patio.        Progress towards functional goal:  Reduce swelling and Reduce pain level  Pain Location left wrist/hand  Pain (0-10): 8   HEP adherence / understanding: compliance with the instructed home exercises.    Precautions:  Fall Risk: None    Precautions listed: none    Therapy diagnoses:   1. Wrist pain, acute, left  Follow Up In Occupational Therapy          Objective:  Measured 8/20/24, 9/30/24    Treatment Performed: (\"NP\" = Not Performed)     Therapeutic Exercise: NP  Therapeutic Activities: NP  -   Manual Therapy: 15 min  Manual edema mobilization to dorsal hand, wrist, forearm and to first dorsal compartment  Neuromuscular Re-Education:   -   Modalities: 25 min  - fluidotherapy with AROM 15 min  - US to dorsal wrist- 3Mhz, 1.0W/cm2, 10 min continuous    Education:  Instructed patient in icing for edema control of wrist.    "

## 2024-10-24 ENCOUNTER — TREATMENT (OUTPATIENT)
Dept: OCCUPATIONAL THERAPY | Facility: CLINIC | Age: 74
End: 2024-10-24
Payer: MEDICARE

## 2024-10-24 DIAGNOSIS — M25.532 WRIST PAIN, ACUTE, LEFT: ICD-10-CM

## 2024-10-24 PROCEDURE — 97022 WHIRLPOOL THERAPY: CPT | Mod: GO | Performed by: OCCUPATIONAL THERAPIST

## 2024-10-24 PROCEDURE — 97140 MANUAL THERAPY 1/> REGIONS: CPT | Mod: GO | Performed by: OCCUPATIONAL THERAPIST

## 2024-10-24 PROCEDURE — 97035 APP MDLTY 1+ULTRASOUND EA 15: CPT | Mod: GO | Performed by: OCCUPATIONAL THERAPIST

## 2024-10-24 NOTE — PROGRESS NOTES
OCCUPATIONAL THERAPY TREATMENT NOTE    Patient Name:  Jeffry Lynn   Patient MRN: [id number]  Date: 10/24/2024  Time Calculation  Start Time: 0345  Stop Time: 0430  Time Calculation (min): 45 min    Insurance:  Visit number: 12  Insurance Type: Payor: [place] MEDICARE / Plan: [employer] MEDICARE / Product Type: *No Product type* /   Authorization or Plan of Care date Range: med necessity 100% covered   Copay:NA  Referred by: Zia Lawrence MD        OT Therapeutic Procedures Time Entry  Manual Therapy Time Entry: 10  Therapeutic Exercise Time Entry: 5  OT Modalities Time Entry  Ultrasound Time Entry: 10  Whirlpool Time Entry: 15                  General:  Reason for visit: left wrist sprain (S-L ligament injury)  Referred by: Dr. Zia Lawrence    Type of surgery: No surgery found  Date of surgery: No surgery found  Days since surgery: No surgery found    Current Problem:         Problem List Items Addressed This Visit             ICD-10-CM    Wrist pain, acute, left M25.532    Relevant Orders    Follow Up In Occupational Therapy               Assessment:  Progress towards functional goals: Reduce swelling  Response to interventions:  Since last re-assessment on 9/30/24, Jeffry's  and pinch strength have remained unchanged.  His pain levels have increased slightly as he has been performing heavier household tasks.  Patient would benefit from additional skilled OT to improved III digit AROM, reduce swelling and increase /pinch strength.  Justification for continued skilled care: To address remaining functional, objective and subjective deficits to allow them to return to full independence with ADLs. Assess effectiveness of HEP. Modify and progress home exercise program. Reduce pain to improve function. Reduce swelling to improve joint ROM and reduce muscular inhibition.    Plan:  Therapeutic exercises to strengthen left UE for functional use in daily activities. ,  "Continued education of techniques such as heat to decrease joint stiffness and muscle tightness., Manual therapy to  muscle tightness and improve joint mobility. , and Modalities as needed to address symptoms.  Continue 1-2x/week for 4 weeks    Subjective:  Patient reports wrist edema resolved and he has minimal discomfort today.  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 Jeffry is frustrated with decreased flexion of the III digit.      Progress towards functional goal:  Reduce swelling and Reduce pain level  Pain Location left wrist/hand  Pain (0-10): 1   HEP adherence / understanding: compliance with the instructed home exercises.    Precautions:  Fall Risk: None    Precautions listed: none    Therapy diagnoses:   1. Wrist pain, acute, left  Follow Up In Occupational Therapy    Follow Up In Occupational Therapy          Objective:  Measured 24, 24, 10/24/24  Left Wrist:  - Flexion: 55  - Extension: 65  - Rd dev: 15  - Ul dev: 25     III Digit:  0/62  -6/ 80  -2/ 64 SERVIN 198     Strength:  Right  60#  Left  35#   Right lat pinch 15#  Left lat pinch 11#  Right 3pt pinch 12#  Left 3pt pinch 11# no pain    Treatment Performed: (\"NP\" = Not Performed)     Therapeutic Exercise: 5 min  -objective measures of AROM, strength and functional outcomes  Therapeutic Activities: NP  -   Manual Therapy: 10 min  Manual edema mobilization to dorsal hand, wrist, forearm and to first dorsal compartment  Neuromuscular Re-Education:   -   Modalities: 25 min  - fluidotherapy with AROM 15 min  - US to dorsal wrist- 3Mhz, 1.0W/cm2, 10 min continuous    Education:  Instructed patient in icing for edema control of wrist.    Goals:  Patient will present with a pain score of 1/10 in left wrist and hand.   Goal progressing 24    Patient to increase left wrist flexion AROM by 10 degrees in order to improve independent performance in daily activities.  Goal achieved 24     Patient will " improve left  strength by 15lb to improve performance in lifting and grasping tasks.  Goal progressing 9/30/24     Patient to improve QuickDASH score to at or below 20% to increase independency in ADLs and IADLs.  Goal progressing 9/30/24     Patient will report understanding of home program, demonstrate independence and verbalize precautions. Goal progressing 9/30/24     Patient will report follow through with wearing of orthosis as instructed by therapist.Goal achieved 9/30/24

## 2024-10-28 ENCOUNTER — TREATMENT (OUTPATIENT)
Dept: OCCUPATIONAL THERAPY | Facility: CLINIC | Age: 74
End: 2024-10-28
Payer: MEDICARE

## 2024-10-28 DIAGNOSIS — M25.532 WRIST PAIN, ACUTE, LEFT: ICD-10-CM

## 2024-10-28 PROCEDURE — 97035 APP MDLTY 1+ULTRASOUND EA 15: CPT | Mod: GO | Performed by: OCCUPATIONAL THERAPIST

## 2024-10-28 PROCEDURE — 97022 WHIRLPOOL THERAPY: CPT | Mod: GO | Performed by: OCCUPATIONAL THERAPIST

## 2024-10-28 PROCEDURE — 97140 MANUAL THERAPY 1/> REGIONS: CPT | Mod: GO | Performed by: OCCUPATIONAL THERAPIST

## 2024-10-31 ENCOUNTER — TREATMENT (OUTPATIENT)
Dept: OCCUPATIONAL THERAPY | Facility: CLINIC | Age: 74
End: 2024-10-31
Payer: MEDICARE

## 2024-10-31 DIAGNOSIS — M25.532 WRIST PAIN, ACUTE, LEFT: Primary | ICD-10-CM

## 2024-10-31 PROCEDURE — 97022 WHIRLPOOL THERAPY: CPT | Mod: GO | Performed by: OCCUPATIONAL THERAPIST

## 2024-10-31 PROCEDURE — 97140 MANUAL THERAPY 1/> REGIONS: CPT | Mod: GO | Performed by: OCCUPATIONAL THERAPIST

## 2024-10-31 PROCEDURE — 97035 APP MDLTY 1+ULTRASOUND EA 15: CPT | Mod: GO | Performed by: OCCUPATIONAL THERAPIST

## 2024-11-04 ENCOUNTER — TREATMENT (OUTPATIENT)
Dept: OCCUPATIONAL THERAPY | Facility: CLINIC | Age: 74
End: 2024-11-04
Payer: MEDICARE

## 2024-11-04 DIAGNOSIS — M25.532 WRIST PAIN, ACUTE, LEFT: ICD-10-CM

## 2024-11-04 PROCEDURE — 97022 WHIRLPOOL THERAPY: CPT | Mod: GO | Performed by: OCCUPATIONAL THERAPIST

## 2024-11-04 PROCEDURE — 97035 APP MDLTY 1+ULTRASOUND EA 15: CPT | Mod: GO | Performed by: OCCUPATIONAL THERAPIST

## 2024-11-04 PROCEDURE — 97140 MANUAL THERAPY 1/> REGIONS: CPT | Mod: GO | Performed by: OCCUPATIONAL THERAPIST

## 2024-11-04 NOTE — PROGRESS NOTES
OCCUPATIONAL THERAPY TREATMENT NOTE    Patient Name:  Jeffry Lynn   Patient MRN: [id number]  Date: 2024  Time Calculation  Start Time: 345  Stop Time: 423  Time Calculation (min): 38 min    Insurance:  Visit number: 14  Insurance Type: Payor: [facility] MEDICARE / Plan: UNITED HEALTHCARE MEDICARE / Product Type: *No Product type* /   Authorization or Plan of Care date Range: med necessity 100% covered   Copay:NA  Referred by: Zia Lawrence MD        OT Therapeutic Procedures Time Entry  Manual Therapy Time Entry: 13  OT Modalities Time Entry  Ultrasound Time Entry: 10  Whirlpool Time Entry: 15                  General:  Reason for visit: left wrist sprain (S-L ligament injury)  Referred by: Dr. Zia Lawrence    Type of surgery: No surgery found  Date of surgery: No surgery found  Days since surgery: No surgery found    Current Problem:         Problem List Items Addressed This Visit             ICD-10-CM    Wrist pain, acute, left M25.532                     Assessment:  Progress towards functional goals: Reduce swelling  Response to interventions:  Full composite fist in the left hand at completion of treatment session using modalities and manual techniques.  Patient is able to actively flex and extend digits with no signs of triggering.   Justification for continued skilled care: To address remaining functional, objective and subjective deficits to allow them to return to full independence with ADLs. Assess effectiveness of HEP. Modify and progress home exercise program. Reduce pain to improve function. Reduce swelling to improve joint ROM and reduce muscular inhibition.    Plan:  Therapeutic exercises to strengthen left UE for functional use in daily activities. , Continued education of techniques such as heat to decrease joint stiffness and muscle tightness., Manual therapy to  muscle tightness and improve joint mobility. , and Modalities as needed to address  "symptoms.  Continue 1-2x/week for 4 weeks    Subjective:  Patient reports he has been wearing his finger flexion glove multiple times per day and he is able to flex and extend digits in left hand with increased ease.      Progress towards functional goal:  Reduce swelling and Reduce pain level  Pain Location left wrist/hand  Pain (0-10): 2   HEP adherence / understanding: compliance with the instructed home exercises.    Precautions:  Fall Risk: None    Precautions listed: none    Therapy diagnoses:   1. Wrist pain, acute, left  Follow Up In Occupational Therapy                Objective:  Measured 8/20/24, 9/30/24, 10/24/24    Treatment Performed: (\"NP\" = Not Performed)     Therapeutic Exercise:NP    Therapeutic Activities: NP  -   Manual Therapy: 13 min  Joint traction and stretches to left hand in all planes including place and hold  Neuromuscular Re-Education:   -   Modalities: 25 min  - fluidotherapy with AROM 15 min  - US to dorsal wrist- 3Mhz, 1.0W/cm2, 10 min continuous    Education: Reviewed home exercise program.    "

## 2024-11-06 ENCOUNTER — TREATMENT (OUTPATIENT)
Dept: OCCUPATIONAL THERAPY | Facility: CLINIC | Age: 74
End: 2024-11-06
Payer: MEDICARE

## 2024-11-06 DIAGNOSIS — M25.532 WRIST PAIN, ACUTE, LEFT: ICD-10-CM

## 2024-11-06 PROCEDURE — 97022 WHIRLPOOL THERAPY: CPT | Mod: GO | Performed by: OCCUPATIONAL THERAPIST

## 2024-11-06 PROCEDURE — 97035 APP MDLTY 1+ULTRASOUND EA 15: CPT | Mod: GO | Performed by: OCCUPATIONAL THERAPIST

## 2024-11-06 PROCEDURE — 97140 MANUAL THERAPY 1/> REGIONS: CPT | Mod: GO | Performed by: OCCUPATIONAL THERAPIST

## 2024-11-06 NOTE — PROGRESS NOTES
OCCUPATIONAL THERAPY TREATMENT NOTE    Patient Name:  Jeffry Lynn   Patient MRN: [id number]  Date: 2024  Time Calculation  Start Time: 0305  Stop Time: 0343  Time Calculation (min): 38 min    Insurance:  Visit number: 15  Insurance Type: Payor: [facility] MEDICARE / Plan: UNITED HEALTHCARE MEDICARE / Product Type: *No Product type* /   Authorization or Plan of Care date Range: med necessity 100% covered   Copay:NA  Referred by: Zia Lawrence MD        OT Therapeutic Procedures Time Entry  Manual Therapy Time Entry: 13  OT Modalities Time Entry  Ultrasound Time Entry: 10  Whirlpool Time Entry: 15                  General:  Reason for visit: left wrist sprain (S-L ligament injury)  Referred by: Dr. Zia Lawrence    Type of surgery: No surgery found  Date of surgery: No surgery found  Days since surgery: No surgery found    Current Problem:         Problem List Items Addressed This Visit             ICD-10-CM    Wrist pain, acute, left M25.532                       Assessment:  Progress towards functional goals: Reduce swelling  Response to interventions:  Patient is able to complete a full composite fist without pain after completion of manual techniques and modalities this visit.  Plan to update HEP for increased strengthening NV if patient's hand remains non-symptomatic..   Justification for continued skilled care: To address remaining functional, objective and subjective deficits to allow them to return to full independence with ADLs. Assess effectiveness of HEP. Modify and progress home exercise program. Reduce pain to improve function. Reduce swelling to improve joint ROM and reduce muscular inhibition.    Plan:  Therapeutic exercises to strengthen left UE for functional use in daily activities. , Continued education of techniques such as heat to decrease joint stiffness and muscle tightness., Manual therapy to  muscle tightness and improve joint mobility. , and  "Modalities as needed to address symptoms.  Continue 1-2x/week for 4 weeks  Increase strengthening HEP NV    Subjective:  Patient reports he has no pain this visit and his middle finger is bending well.      Progress towards functional goal:  Reduce swelling and Reduce pain level  Pain Location left wrist/hand  Pain (0-10): 0   HEP adherence / understanding: compliance with the instructed home exercises.    Precautions:  Fall Risk: None    Precautions listed: none    Therapy diagnoses:   1. Wrist pain, acute, left  Follow Up In Occupational Therapy            Objective:  Measured 8/20/24, 9/30/24, 10/24/24    Treatment Performed: (\"NP\" = Not Performed)     Therapeutic Exercise:NP    Therapeutic Activities: NP  -   Manual Therapy: 13 min  Joint traction and stretches to left hand in all planes including place and hold  Neuromuscular Re-Education:   -   Modalities: 25 min  - fluidotherapy with AROM 15 min  - US to dorsal wrist- 3Mhz, 1.0W/cm2, 10 min continuous    Education: Reviewed home exercise program.    "

## 2024-11-11 ENCOUNTER — TREATMENT (OUTPATIENT)
Dept: OCCUPATIONAL THERAPY | Facility: CLINIC | Age: 74
End: 2024-11-11
Payer: MEDICARE

## 2024-11-11 DIAGNOSIS — M25.532 WRIST PAIN, ACUTE, LEFT: ICD-10-CM

## 2024-11-11 PROCEDURE — 97035 APP MDLTY 1+ULTRASOUND EA 15: CPT | Mod: GO | Performed by: OCCUPATIONAL THERAPIST

## 2024-11-11 PROCEDURE — 97022 WHIRLPOOL THERAPY: CPT | Mod: GO | Performed by: OCCUPATIONAL THERAPIST

## 2024-11-11 PROCEDURE — 97140 MANUAL THERAPY 1/> REGIONS: CPT | Mod: GO | Performed by: OCCUPATIONAL THERAPIST

## 2024-11-11 NOTE — PROGRESS NOTES
OCCUPATIONAL THERAPY TREATMENT NOTE    Patient Name:  Jeffry Lynn   Patient MRN: [id number]  Date: 11/11/2024  Time Calculation  Start Time: 0215  Stop Time: 0253  Time Calculation (min): 38 min    Insurance:  Visit number: 16  Insurance Type: Payor: [facility] MEDICARE / Plan: UNITED HEALTHCARE MEDICARE / Product Type: *No Product type* /   Authorization or Plan of Care date Range: med necessity 100% covered   Copay:NA  Referred by: Zia Lawrence MD        OT Therapeutic Procedures Time Entry  Manual Therapy Time Entry: 8  Therapeutic Exercise Time Entry: 5  OT Modalities Time Entry  Ultrasound Time Entry: 10  Whirlpool Time Entry: 15                  General:  Reason for visit: left wrist sprain (S-L ligament injury)  Referred by: Dr. Zia Lawrence    Type of surgery: No surgery found  Date of surgery: No surgery found  Days since surgery: No surgery found    Current Problem:         Problem List Items Addressed This Visit             ICD-10-CM    Wrist pain, acute, left M25.532                       Assessment:  Progress towards functional goals: Reduce swelling  Response to interventions:  As patient's pain and swelling are improved and no incidents of triggering in several weeks, OT added  and pinch strengthening to HEP with pink foam block. Patient demonstrated good technique and verbalized good understanding of progression of  and pinch block exercises.   Justification for continued skilled care: To address remaining functional, objective and subjective deficits to allow them to return to full independence with ADLs. Assess effectiveness of HEP. Modify and progress home exercise program. Reduce pain to improve function. Reduce swelling to improve joint ROM and reduce muscular inhibition.    Plan:  Therapeutic exercises to strengthen left UE for functional use in daily activities. , Continued education of techniques such as heat to decrease joint stiffness and  "muscle tightness., Manual therapy to  muscle tightness and improve joint mobility. , and Modalities as needed to address symptoms.  Continue 1-2x/week for 4 weeks    Subjective:  Patient reports max pain levels have been 2/10 with resting pain of 0/10.      Progress towards functional goal:  Reduce swelling and Reduce pain level  Pain Location left wrist/hand  Pain (0-10): 0   HEP adherence / understanding: compliance with the instructed home exercises.    Precautions:  Fall Risk: None    Precautions listed: none    Therapy diagnoses:   1. Wrist pain, acute, left  Follow Up In Occupational Therapy            Objective:  Measured 24, 24, 10/24/24    Treatment Performed: (\"NP\" = Not Performed)     Therapeutic Exercise:5 min  - pink foam block  10x  - pink foam block tip pinch 5x/ea digit  Therapeutic Activities: NP  -   Manual Therapy: 8 min  Joint traction and stretches to left hand in all planes including place and hold  Neuromuscular Re-Education:   -   Modalities: 25 min  - fluidotherapy with AROM 15 min  - US to dorsal wrist- 3Mhz, 1.0W/cm2, 10 min continuous    Education: Added  and pinch strengthening exercise(s) to HEP program    "

## 2024-11-13 ENCOUNTER — TREATMENT (OUTPATIENT)
Dept: OCCUPATIONAL THERAPY | Facility: CLINIC | Age: 74
End: 2024-11-13
Payer: MEDICARE

## 2024-11-13 DIAGNOSIS — M25.532 WRIST PAIN, ACUTE, LEFT: ICD-10-CM

## 2024-11-13 PROCEDURE — 97022 WHIRLPOOL THERAPY: CPT | Mod: GO | Performed by: OCCUPATIONAL THERAPIST

## 2024-11-13 PROCEDURE — 97140 MANUAL THERAPY 1/> REGIONS: CPT | Mod: GO | Performed by: OCCUPATIONAL THERAPIST

## 2024-11-13 PROCEDURE — 97035 APP MDLTY 1+ULTRASOUND EA 15: CPT | Mod: GO | Performed by: OCCUPATIONAL THERAPIST

## 2024-11-13 NOTE — PROGRESS NOTES
OCCUPATIONAL THERAPY TREATMENT NOTE    Patient Name:  Jeffry Lynn   Patient MRN: [id number]  Date: 2024  Time Calculation  Start Time: 0300  Stop Time: 0340  Time Calculation (min): 40 min    Insurance:  Visit number: 17  Insurance Type: Payor: [employer] MEDICARE / Plan: [employer] HE[employer] MEDICARE / Product Type: *No Product type* /   Authorization or Plan of Care date Range: med necessity 100% covered   Copay:NA  Referred by: Zia Lawrence MD        OT Therapeutic Procedures Time Entry  Manual Therapy Time Entry: 13  OT Modalities Time Entry  Ultrasound Time Entry: 10  Whirlpool Time Entry: 15                  General:  Reason for visit: left wrist sprain (S-L ligament injury)  Referred by: Dr. Zia Lawrence    Type of surgery: No surgery found  Date of surgery: No surgery found  Days since surgery: No surgery found    Current Problem:         Problem List Items Addressed This Visit             ICD-10-CM    Wrist pain, acute, left M25.532                         Assessment:  Progress towards functional goals: Reduce swelling  Response to interventions:  Slight increase in edema in volar III digit proximal phalanx.  This edema did not impact active or passive flexion this visit.  OT instructed patient to continue with foam block strengthening and monitor edema.   Justification for continued skilled care: To address remaining functional, objective and subjective deficits to allow them to return to full independence with ADLs. Assess effectiveness of HEP. Modify and progress home exercise program. Reduce pain to improve function. Reduce swelling to improve joint ROM and reduce muscular inhibition.    Plan:  Therapeutic exercises to strengthen left UE for functional use in daily activities. , Continued education of techniques such as heat to decrease joint stiffness and muscle tightness., Manual therapy to  muscle tightness and improve joint mobility. , and  "Modalities as needed to address symptoms.  Re-assessment in 2 visits    Subjective:  Patient reports he has been compliant with the pink block exercises with no increase in pain or swelling.      Progress towards functional goal:  Reduce swelling and Reduce pain level  Pain Location left wrist/hand  Pain (0-10): 0   HEP adherence / understanding: compliance with the instructed home exercises.    Precautions:  Fall Risk: None    Precautions listed: none    Therapy diagnoses:   1. Wrist pain, acute, left  Follow Up In Occupational Therapy              Objective:  Measured 8/20/24, 9/30/24, 10/24/24    Treatment Performed: (\"NP\" = Not Performed)     Therapeutic Exercise:NP    Therapeutic Activities: NP  -   Manual Therapy: 13 min  Joint traction and stretches to left hand in all planes including place and hold  Neuromuscular Re-Education:   -   Modalities: 25 min  - fluidotherapy with AROM 15 min  - US to dorsal wrist- 3Mhz, 1.0W/cm2, 10 min continuous    Education: Reviewed home exercise program.    "

## 2024-11-14 PROBLEM — N18.32 CHRONIC KIDNEY DISEASE, STAGE 3B (MULTI): Status: ACTIVE | Noted: 2023-02-15

## 2024-11-14 PROBLEM — E66.812 CLASS 2 OBESITY WITH BODY MASS INDEX (BMI) OF 39.0 TO 39.9 IN ADULT: Status: ACTIVE | Noted: 2023-02-15

## 2024-11-14 PROBLEM — N17.9 AKI (ACUTE KIDNEY INJURY) (CMS-HCC): Status: RESOLVED | Noted: 2023-02-15 | Resolved: 2024-11-14

## 2024-11-18 ENCOUNTER — TREATMENT (OUTPATIENT)
Dept: OCCUPATIONAL THERAPY | Facility: CLINIC | Age: 74
End: 2024-11-18
Payer: MEDICARE

## 2024-11-18 DIAGNOSIS — M25.532 WRIST PAIN, ACUTE, LEFT: ICD-10-CM

## 2024-11-18 PROCEDURE — 97035 APP MDLTY 1+ULTRASOUND EA 15: CPT | Mod: GO | Performed by: OCCUPATIONAL THERAPIST

## 2024-11-18 PROCEDURE — 97140 MANUAL THERAPY 1/> REGIONS: CPT | Mod: GO | Performed by: OCCUPATIONAL THERAPIST

## 2024-11-18 PROCEDURE — 97022 WHIRLPOOL THERAPY: CPT | Mod: GO | Performed by: OCCUPATIONAL THERAPIST

## 2024-11-18 NOTE — PROGRESS NOTES
OCCUPATIONAL THERAPY TREATMENT NOTE    Patient Name:  Jeffry Lynn   Patient MRN: [id number]  Date: 11/18/2024  Time Calculation  Start Time: 0300  Stop Time: 0340  Time Calculation (min): 40 min    Insurance:  Visit number: 18  Insurance Type: Payor: [facility] MEDICARE / Plan: [employer] MEDICARE / Product Type: *No Product type* /   Authorization or Plan of Care date Range: med necessity 100% covered   Copay:NA  Referred by: Zia Lawrence MD        OT Therapeutic Procedures Time Entry  Manual Therapy Time Entry: 13  OT Modalities Time Entry  Ultrasound Time Entry: 10  Whirlpool Time Entry: 15                  General:  Reason for visit: left wrist sprain (S-L ligament injury)  Referred by: Dr. Zia Lawrence    Type of surgery: No surgery found  Date of surgery: No surgery found  Days since surgery: No surgery found    Current Problem:         Problem List Items Addressed This Visit             ICD-10-CM    Wrist pain, acute, left M25.532       Assessment:  Progress towards functional goals: Reduce swelling  Response to interventions:  Slight increase in edema at proximal phalanx when compared to last several visit.  Despite edema, no change in active flexion of III digit and patient continues to be able to make a composite fist.  His main complaint is increased achiness.  Plan to re-assess NV and increase strengthening program as pain and range of motion have improved.   Justification for continued skilled care: To address remaining functional, objective and subjective deficits to allow them to return to full independence with ADLs. Assess effectiveness of HEP. Modify and progress home exercise program. Reduce pain to improve function. Reduce swelling to improve joint ROM and reduce muscular inhibition.    Plan:  Therapeutic exercises to strengthen left UE for functional use in daily activities. , Continued education of techniques such as heat to decrease joint  "stiffness and muscle tightness., Manual therapy to  muscle tightness and improve joint mobility. , and Modalities as needed to address symptoms.  Re-assessment next visit    Subjective:  Patient reports he \"smacked his hand\" on Friday while doing housework.  He reports increased pain and swelling on Saturday, however it is slowly improving.      Progress towards functional goal:  Reduce swelling and Reduce pain level  Pain Location left wrist/hand  Pain (0-10): 0   HEP adherence / understanding: compliance with the instructed home exercises.    Precautions:  Fall Risk: None    Precautions listed: none    Therapy diagnoses:   1. Wrist pain, acute, left  Follow Up In Occupational Therapy            Objective:  Measured 24, 24, 10/24/24    Treatment Performed: (\"NP\" = Not Performed)     Therapeutic Exercise:NP    Therapeutic Activities: NP  -   Manual Therapy: 13 min  Joint traction and stretches to left hand in all planes including place and hold  Neuromuscular Re-Education:   -   Modalities: 25 min  - fluidotherapy with AROM 15 min  - US to dorsal wrist- 3Mhz, 1.0W/cm2, 10 min continuous    Education: Reviewed home exercise program.    "

## 2024-11-21 ENCOUNTER — TREATMENT (OUTPATIENT)
Dept: OCCUPATIONAL THERAPY | Facility: CLINIC | Age: 74
End: 2024-11-21
Payer: MEDICARE

## 2024-11-21 DIAGNOSIS — M25.532 WRIST PAIN, ACUTE, LEFT: ICD-10-CM

## 2024-11-21 PROCEDURE — 97110 THERAPEUTIC EXERCISES: CPT | Mod: GO | Performed by: OCCUPATIONAL THERAPIST

## 2024-11-21 PROCEDURE — 97035 APP MDLTY 1+ULTRASOUND EA 15: CPT | Mod: GO | Performed by: OCCUPATIONAL THERAPIST

## 2024-11-21 PROCEDURE — 97022 WHIRLPOOL THERAPY: CPT | Mod: GO | Performed by: OCCUPATIONAL THERAPIST

## 2024-11-21 NOTE — PROGRESS NOTES
OCCUPATIONAL THERAPY TREATMENT NOTE    Patient Name:  Jeffry Lynn   Patient MRN: [id number]  Date: 11/21/2024  Time Calculation  Start Time: 0300  Stop Time: 0340  Time Calculation (min): 40 min    Insurance:  Visit number: 19  Insurance Type: Payor: [facility] MEDICARE / Plan: [employer] MEDICARE / Product Type: *No Product type* /   Authorization or Plan of Care date Range: med necessity 100% covered   Copay:NA  Referred by: Zia Lawrence MD        OT Therapeutic Procedures Time Entry  Therapeutic Exercise Time Entry: 15  OT Modalities Time Entry  Ultrasound Time Entry: 10  Whirlpool Time Entry: 15                  General:  Reason for visit: left wrist sprain (S-L ligament injury)  Referred by: Dr. Zia Lawrence    Type of surgery: No surgery found  Date of surgery: No surgery found  Days since surgery: No surgery found    Current Problem:         Problem List Items Addressed This Visit             ICD-10-CM    Wrist pain, acute, left M25.532    Relevant Orders    Follow Up In Occupational Therapy       Assessment:  Progress towards functional goals: Reduce swelling  Response to interventions:  Since last re-assessment, Jeffry has increased III digit AROM by 64 degrees and is now WNL.  He has increased his left  strength, however, continues to be limited in home management tasks requiring strength such as opening jars/bottles, carrying heavy objects such as case of water, tool use and lifting heavy pots in kitchen.   Justification for continued skilled care: To address remaining functional, objective and subjective deficits to allow them to return to full independence with ADLs. Assess effectiveness of HEP. Modify and progress home exercise program. Reduce pain to improve function. Reduce swelling to improve joint ROM and reduce muscular inhibition.    Plan:  Therapeutic exercises to strengthen left UE for functional use in daily activities. , Continued education  "of techniques such as heat to decrease joint stiffness and muscle tightness., Manual therapy to  muscle tightness and improve joint mobility. , and Modalities as needed to address symptoms.  Continue 1x/6 weeks to focus on strengthening    Subjective:  Patient reports he is concerned with lack of strength in hand now that the middle finger is moving better.      Progress towards functional goal:  Reduce swelling and Reduce pain level  Pain Location left wrist/hand  Pain (0-10): 0   HEP adherence / understanding: compliance with the instructed home exercises.    Precautions:  Fall Risk: None    Precautions listed: none    Therapy diagnoses:   1. Wrist pain, acute, left  Follow Up In Occupational Therapy    Follow Up In Occupational Therapy            Objective:  Measured 24, 24, 10/24/24, 24    III Digit:   - SERVIN 262 (increase 64)     Strength:  Right  60#  Left  45#   Right lat pinch 15#  Left lat pinch 13#  Right 3pt pinch 12#  Left 3pt pinch 11#     Treatment Performed: (\"NP\" = Not Performed)     Therapeutic Exercise:15 min  -objective meausures of AROM, strength  HEP update:  putty strengthening  Blue putty  , blue putty roll and tip pinch, blue putty lat pinch, blue putty lumbrical pinch  Therapeutic Activities: NP  -   Manual Therapy: NP  Neuromuscular Re-Education:   -   Modalities: 25 min  - fluidotherapy with AROM 15 min  - US to dorsal wrist- 3Mhz, 1.0W/cm2, 10 min continuous    Education: Reviewed home exercise program.    Goals:  Patient will present with a pain score of 1/10 in left wrist and hand.   Goal progressing 24     Patient to increase left wrist flexion AROM by 10 degrees in order to improve independent performance in daily activities.  Goal achieved 24     Patient will improve left  strength by 15lb (50#) to improve performance in lifting and grasping tasks.  Goal progressing 24     Patient to improve QuickDASH score to at " or below 20% to increase independency in ADLs and IADLs.  Goal progressing 11/21/24     Patient will report understanding of home program, demonstrate independence and verbalize precautions. Goal progressing 11/21/24     Patient will report follow through with wearing of orthosis as instructed by therapist.Goal achieved 9/30/24

## 2024-12-05 ENCOUNTER — TREATMENT (OUTPATIENT)
Dept: OCCUPATIONAL THERAPY | Facility: CLINIC | Age: 74
End: 2024-12-05
Payer: MEDICARE

## 2024-12-05 DIAGNOSIS — M25.532 WRIST PAIN, ACUTE, LEFT: ICD-10-CM

## 2024-12-05 PROCEDURE — 97035 APP MDLTY 1+ULTRASOUND EA 15: CPT | Mod: GO | Performed by: OCCUPATIONAL THERAPIST

## 2024-12-05 PROCEDURE — 97530 THERAPEUTIC ACTIVITIES: CPT | Mod: GO | Performed by: OCCUPATIONAL THERAPIST

## 2024-12-05 PROCEDURE — 97022 WHIRLPOOL THERAPY: CPT | Mod: GO | Performed by: OCCUPATIONAL THERAPIST

## 2024-12-05 NOTE — PROGRESS NOTES
OCCUPATIONAL THERAPY TREATMENT NOTE    Patient Name:  Jeffry Lynn   Patient MRN: [id number]  Date: 2024  Time Calculation  Start Time: 0200  Stop Time: 0245  Time Calculation (min): 45 min    Insurance:  Visit number: 19  Insurance Type: Payor: [facility] MEDICARE / Plan: [employer] MEDICARE / Product Type: *No Product type* /   Authorization or Plan of Care date Range: med necessity 100% covered   Copay:NA  Referred by: Zia Lawrence MD        OT Therapeutic Procedures Time Entry  Therapeutic Activity Time Entry: 15  OT Modalities Time Entry  Ultrasound Time Entry: 10  Whirlpool Time Entry: 15                  General:  Reason for visit: left wrist sprain (S-L ligament injury)  Referred by: Dr. Zia Lawrence    Type of surgery: No surgery found  Date of surgery: No surgery found  Days since surgery: No surgery found    Current Problem:         Problem List Items Addressed This Visit             ICD-10-CM    Wrist pain, acute, left M25.532         Assessment:  Progress towards functional goals: Reduce swelling  Response to interventions:  OT initiated BTE program for strengthening this visit.  Patient was able to complete program with good grasp on tools.  Jeffry was challenged with weakness and fatigue during 3pt pinch and  tools.   Justification for continued skilled care: To address remaining functional, objective and subjective deficits to allow them to return to full independence with ADLs. Assess effectiveness of HEP. Modify and progress home exercise program. Reduce pain to improve function. Reduce swelling to improve joint ROM and reduce muscular inhibition.    Plan:  Therapeutic exercises to strengthen left UE for functional use in daily activities. , Continued education of techniques such as heat to decrease joint stiffness and muscle tightness., Manual therapy to  muscle tightness and improve joint mobility. , and Modalities as needed to  "address symptoms.  Continue 1x/6 weeks to focus on strengthening    Subjective:  Patient reports he has not experienced too much pain in the past week.      Progress towards functional goal:  Reduce swelling and Reduce pain level  Pain Location left wrist/hand  Pain (0-10): 0   HEP adherence / understanding: compliance with the instructed home exercises.    Precautions:  Fall Risk: None    Precautions listed: none    Therapy diagnoses:   1. Wrist pain, acute, left  Follow Up In Occupational Therapy        Objective:  Measured 8/20/24, 9/30/24, 10/24/24, 11/21/24    Treatment Performed: (\"NP\" = Not Performed)     Therapeutic Exercise:NP    Therapeutic Activities: 15 min  -  1.6T 2 min  -  1T 2 min  -  5T 2 min  -  2.9T 2 min  -  3T 2 min  Manual Therapy: NP  Neuromuscular Re-Education: NP  -   Modalities: 25 min  - fluidotherapy with AROM 15 min  - US to dorsal wrist- 3Mhz, 1.0W/cm2, 10 min continuous     Education: Reviewed home exercise program.    "

## 2024-12-07 DIAGNOSIS — G47.33 OBSTRUCTIVE SLEEP APNEA SYNDROME: ICD-10-CM

## 2024-12-07 DIAGNOSIS — J34.9 SINUS PROBLEM: ICD-10-CM

## 2024-12-09 ENCOUNTER — LAB (OUTPATIENT)
Dept: LAB | Facility: LAB | Age: 74
End: 2024-12-09
Payer: MEDICARE

## 2024-12-09 DIAGNOSIS — E53.8 DEFICIENCY OF OTHER SPECIFIED B GROUP VITAMINS: ICD-10-CM

## 2024-12-09 DIAGNOSIS — E11.9 TYPE 2 DIABETES MELLITUS WITHOUT COMPLICATIONS (MULTI): Primary | ICD-10-CM

## 2024-12-09 DIAGNOSIS — E55.9 VITAMIN D DEFICIENCY, UNSPECIFIED: ICD-10-CM

## 2024-12-09 DIAGNOSIS — E03.9 HYPOTHYROIDISM, UNSPECIFIED: ICD-10-CM

## 2024-12-09 LAB
25(OH)D3 SERPL-MCNC: 26 NG/ML (ref 30–100)
ALBUMIN SERPL BCP-MCNC: 4 G/DL (ref 3.4–5)
ALP SERPL-CCNC: 96 U/L (ref 33–136)
ALT SERPL W P-5'-P-CCNC: 11 U/L (ref 10–52)
ANION GAP SERPL CALC-SCNC: 10 MMOL/L (ref 10–20)
AST SERPL W P-5'-P-CCNC: 12 U/L (ref 9–39)
BASOPHILS # BLD AUTO: 0.06 X10*3/UL (ref 0–0.1)
BASOPHILS NFR BLD AUTO: 0.9 %
BILIRUB SERPL-MCNC: 0.7 MG/DL (ref 0–1.2)
BUN SERPL-MCNC: 29 MG/DL (ref 6–23)
CALCIUM SERPL-MCNC: 9.3 MG/DL (ref 8.6–10.3)
CHLORIDE SERPL-SCNC: 106 MMOL/L (ref 98–107)
CO2 SERPL-SCNC: 26 MMOL/L (ref 21–32)
CREAT SERPL-MCNC: 1.57 MG/DL (ref 0.5–1.3)
EGFRCR SERPLBLD CKD-EPI 2021: 46 ML/MIN/1.73M*2
EOSINOPHIL # BLD AUTO: 0.19 X10*3/UL (ref 0–0.4)
EOSINOPHIL NFR BLD AUTO: 2.9 %
ERYTHROCYTE [DISTWIDTH] IN BLOOD BY AUTOMATED COUNT: 15 % (ref 11.5–14.5)
GLUCOSE SERPL-MCNC: 197 MG/DL (ref 74–99)
HCT VFR BLD AUTO: 40 % (ref 41–52)
HGB BLD-MCNC: 12.2 G/DL (ref 13.5–17.5)
IMM GRANULOCYTES # BLD AUTO: 0.01 X10*3/UL (ref 0–0.5)
IMM GRANULOCYTES NFR BLD AUTO: 0.2 % (ref 0–0.9)
LYMPHOCYTES # BLD AUTO: 1.62 X10*3/UL (ref 0.8–3)
LYMPHOCYTES NFR BLD AUTO: 25 %
MCH RBC QN AUTO: 28.7 PG (ref 26–34)
MCHC RBC AUTO-ENTMCNC: 30.5 G/DL (ref 32–36)
MCV RBC AUTO: 94 FL (ref 80–100)
MONOCYTES # BLD AUTO: 0.5 X10*3/UL (ref 0.05–0.8)
MONOCYTES NFR BLD AUTO: 7.7 %
NEUTROPHILS # BLD AUTO: 4.1 X10*3/UL (ref 1.6–5.5)
NEUTROPHILS NFR BLD AUTO: 63.3 %
NRBC BLD-RTO: 0 /100 WBCS (ref 0–0)
PLATELET # BLD AUTO: 221 X10*3/UL (ref 150–450)
POTASSIUM SERPL-SCNC: 3.6 MMOL/L (ref 3.5–5.3)
PROT SERPL-MCNC: 6.5 G/DL (ref 6.4–8.2)
RBC # BLD AUTO: 4.25 X10*6/UL (ref 4.5–5.9)
SODIUM SERPL-SCNC: 138 MMOL/L (ref 136–145)
T4 FREE SERPL-MCNC: 0.8 NG/DL (ref 0.61–1.12)
THYROPEROXIDASE AB SERPL-ACNC: <28 IU/ML
TSH SERPL-ACNC: 5.83 MIU/L (ref 0.44–3.98)
VIT B12 SERPL-MCNC: 229 PG/ML (ref 211–911)
WBC # BLD AUTO: 6.5 X10*3/UL (ref 4.4–11.3)

## 2024-12-09 PROCEDURE — 84443 ASSAY THYROID STIM HORMONE: CPT

## 2024-12-09 PROCEDURE — 84439 ASSAY OF FREE THYROXINE: CPT

## 2024-12-09 PROCEDURE — 82306 VITAMIN D 25 HYDROXY: CPT

## 2024-12-09 PROCEDURE — 83036 HEMOGLOBIN GLYCOSYLATED A1C: CPT

## 2024-12-09 PROCEDURE — 82607 VITAMIN B-12: CPT

## 2024-12-09 PROCEDURE — 85025 COMPLETE CBC W/AUTO DIFF WBC: CPT

## 2024-12-09 PROCEDURE — 36415 COLL VENOUS BLD VENIPUNCTURE: CPT

## 2024-12-09 PROCEDURE — 86376 MICROSOMAL ANTIBODY EACH: CPT

## 2024-12-09 PROCEDURE — 80053 COMPREHEN METABOLIC PANEL: CPT

## 2024-12-09 PROCEDURE — 86800 THYROGLOBULIN ANTIBODY: CPT

## 2024-12-09 RX ORDER — FLUTICASONE PROPIONATE 50 MCG
1 SPRAY, SUSPENSION (ML) NASAL DAILY
Qty: 16 ML | Refills: 1 | Status: SHIPPED | OUTPATIENT
Start: 2024-12-09

## 2024-12-10 LAB
EST. AVERAGE GLUCOSE BLD GHB EST-MCNC: 174 MG/DL
HBA1C MFR BLD: 7.7 %

## 2024-12-11 LAB — THYROGLOB AB SERPL-ACNC: <0.9 IU/ML (ref 0–4)

## 2024-12-12 ENCOUNTER — APPOINTMENT (OUTPATIENT)
Dept: CARDIOLOGY | Facility: CLINIC | Age: 74
End: 2024-12-12
Payer: MEDICARE

## 2024-12-12 ENCOUNTER — TREATMENT (OUTPATIENT)
Dept: OCCUPATIONAL THERAPY | Facility: CLINIC | Age: 74
End: 2024-12-12
Payer: MEDICARE

## 2024-12-12 VITALS
SYSTOLIC BLOOD PRESSURE: 135 MMHG | BODY MASS INDEX: 41.4 KG/M2 | WEIGHT: 273.2 LBS | OXYGEN SATURATION: 99 % | HEIGHT: 68 IN | HEART RATE: 55 BPM | DIASTOLIC BLOOD PRESSURE: 62 MMHG

## 2024-12-12 DIAGNOSIS — I25.10 CORONARY ARTERY CALCIFICATION SEEN ON CAT SCAN: Primary | ICD-10-CM

## 2024-12-12 DIAGNOSIS — N18.32 CHRONIC KIDNEY DISEASE, STAGE 3B (MULTI): ICD-10-CM

## 2024-12-12 DIAGNOSIS — R07.89 CHEST PAIN, ATYPICAL: ICD-10-CM

## 2024-12-12 DIAGNOSIS — E66.812 CLASS 2 OBESITY WITH BODY MASS INDEX (BMI) OF 39.0 TO 39.9 IN ADULT, UNSPECIFIED OBESITY TYPE, UNSPECIFIED WHETHER SERIOUS COMORBIDITY PRESENT: ICD-10-CM

## 2024-12-12 DIAGNOSIS — M25.532 WRIST PAIN, ACUTE, LEFT: ICD-10-CM

## 2024-12-12 DIAGNOSIS — E78.00 PURE HYPERCHOLESTEROLEMIA: ICD-10-CM

## 2024-12-12 DIAGNOSIS — R60.0 LEG EDEMA: ICD-10-CM

## 2024-12-12 DIAGNOSIS — G47.33 OBSTRUCTIVE SLEEP APNEA SYNDROME: ICD-10-CM

## 2024-12-12 DIAGNOSIS — I10 ESSENTIAL HYPERTENSION: ICD-10-CM

## 2024-12-12 PROCEDURE — 3061F NEG MICROALBUMINURIA REV: CPT | Performed by: INTERNAL MEDICINE

## 2024-12-12 PROCEDURE — G2211 COMPLEX E/M VISIT ADD ON: HCPCS | Performed by: INTERNAL MEDICINE

## 2024-12-12 PROCEDURE — 3051F HG A1C>EQUAL 7.0%<8.0%: CPT | Performed by: INTERNAL MEDICINE

## 2024-12-12 PROCEDURE — 99214 OFFICE O/P EST MOD 30 MIN: CPT | Performed by: INTERNAL MEDICINE

## 2024-12-12 PROCEDURE — 3048F LDL-C <100 MG/DL: CPT | Performed by: INTERNAL MEDICINE

## 2024-12-12 PROCEDURE — 97022 WHIRLPOOL THERAPY: CPT | Mod: GO | Performed by: OCCUPATIONAL THERAPIST

## 2024-12-12 PROCEDURE — 97530 THERAPEUTIC ACTIVITIES: CPT | Mod: GO | Performed by: OCCUPATIONAL THERAPIST

## 2024-12-12 PROCEDURE — 1123F ACP DISCUSS/DSCN MKR DOCD: CPT | Performed by: INTERNAL MEDICINE

## 2024-12-12 PROCEDURE — 3075F SYST BP GE 130 - 139MM HG: CPT | Performed by: INTERNAL MEDICINE

## 2024-12-12 PROCEDURE — 97035 APP MDLTY 1+ULTRASOUND EA 15: CPT | Mod: GO | Performed by: OCCUPATIONAL THERAPIST

## 2024-12-12 PROCEDURE — 3078F DIAST BP <80 MM HG: CPT | Performed by: INTERNAL MEDICINE

## 2024-12-12 NOTE — PROGRESS NOTES
OCCUPATIONAL THERAPY TREATMENT NOTE    Patient Name:  Jeffry Lynn   Patient MRN: [id number]  Date: 2024  Time Calculation  Start Time: 0200  Stop Time: 0240  Time Calculation (min): 40 min    Insurance:  Visit number: 20  Insurance Type: Payor: [facility] MEDICARE / Plan: [employer] MEDICARE / Product Type: *No Product type* /   Authorization or Plan of Care date Range: med necessity 100% covered   Copay:NA  Referred by: Zia Lawrence MD        OT Therapeutic Procedures Time Entry  Therapeutic Activity Time Entry: 15  OT Modalities Time Entry  Ultrasound Time Entry: 10  Whirlpool Time Entry: 15                  General:  Reason for visit: left wrist sprain (S-L ligament injury)  Referred by: Dr. Zia Lawrence    Type of surgery: No surgery found  Date of surgery: No surgery found  Days since surgery: No surgery found    Current Problem:         Problem List Items Addressed This Visit             ICD-10-CM    Wrist pain, acute, left M25.532           Assessment:  Progress towards functional goals: Reduce swelling  Response to interventions:  Patient completed BTE program with increased resistance on each tool.  No increase in pain levels or LMF edema at completion of program.     Justification for continued skilled care: To address remaining functional, objective and subjective deficits to allow them to return to full independence with ADLs. Assess effectiveness of HEP. Modify and progress home exercise program. Reduce pain to improve function. Reduce swelling to improve joint ROM and reduce muscular inhibition.    Plan:  Therapeutic exercises to strengthen left UE for functional use in daily activities. , Continued education of techniques such as heat to decrease joint stiffness and muscle tightness., Manual therapy to  muscle tightness and improve joint mobility. , and Modalities as needed to address symptoms.  Continue 1x/6 weeks to focus on  "strengthening    Subjective:  Patient reports after last visit his pain was not increased from strengthening program.      Progress towards functional goal:  Reduce swelling and Reduce pain level  Pain Location left wrist/hand  Pain (0-10): 0   HEP adherence / understanding: compliance with the instructed home exercises.    Precautions:  Fall Risk: None    Precautions listed: none    Therapy diagnoses:   1. Wrist pain, acute, left  Follow Up In Occupational Therapy          Objective:  Measured 8/20/24, 9/30/24, 10/24/24, 11/21/24    Treatment Performed: (\"NP\" = Not Performed)     Therapeutic Exercise:NP    Therapeutic Activities: 15 min  -  3T 2 min  -  2T 2 min  -  5T 2 min  -  3T 2 min  -  5T 2 min  Manual Therapy: NP  Neuromuscular Re-Education: NP  -   Modalities: 25 min  - fluidotherapy with AROM 15 min  - US to LMF- 3Mhz, 1.0W/cm2, 10 min continuous     Education: Reviewed home exercise program.    "

## 2024-12-12 NOTE — PROGRESS NOTES
"Chief Complaint:   Follow-up (6 month)     History Of Present Illness:    Jeffry Lynn is a 74 y.o. male presenting with CV DZ.      Patient denies CP/ SOB/palpitations/dizziness/lightheadedness/edema/claudication    No regular exercise but more active     Last Recorded Vitals:  Vitals:    12/12/24 1013 12/12/24 1050   BP: 142/74 135/62   BP Location: Left arm    Patient Position: Sitting    BP Cuff Size: Large adult    Pulse: 55    SpO2: 99%    Weight: 124 kg (273 lb 3.2 oz)    Height: 1.727 m (5' 8\")             Allergies:  Patient has no known allergies.    Outpatient Medications:  Current Outpatient Medications   Medication Instructions    aspirin 81 mg EC tablet 1 tablet, Daily    atorvastatin (LIPITOR) 40 mg, oral, Daily    blood sugar diagnostic (OneTouch Verio test strips) strip TEST TWICE DAILY AS NEEDED    esomeprazole (NEXIUM) 20 mg, Daily before breakfast    fluticasone (Flonase) 50 mcg/actuation nasal spray 1 spray, Each Nostril, Daily    furosemide (LASIX) 20 mg, oral, Daily    gabapentin (NEURONTIN) 600 mg, oral, 3 times daily    glimepiride (AMARYL) 2 mg, oral, Daily before breakfast    hydroCHLOROthiazide (HYDRODIURIL) 25 mg, oral, Daily    loratadine (Claritin) 10 mg tablet 1 tablet, Daily    metFORMIN XR (GLUCOPHAGE-XR) 500 mg, 2 times daily (morning and late afternoon)    metoprolol succinate XL (TOPROL-XL) 50 mg, oral, Daily    multivitamin-iron-folic acid (Centrum)  mg-mcg tablet tablet 1 tablet, Daily    OneTouch Delica Plus Lancet 33 gauge misc TEST TWICE DAILY AND AS NEEDED    OneTouch Verio Flex meter misc use as directed.    pioglitazone (Actos) 45 mg tablet TAKE 1 TABLET BY MOUTH ONCE  DAILY    rizatriptan (Maxalt) 10 mg tablet     tamsulosin (Flomax) 0.4 mg 24 hr capsule     topiramate (TOPAMAX) 100 mg, oral, 2 times daily    Trulicity 0.75 mg, Once Weekly       Physical Exam:  Constitutional:       Appearance: Healthy appearance. Not in distress. Obese.   Neck:      Vascular: " No JVR. JVD normal.   Pulmonary:      Effort: Pulmonary effort is normal.      Breath sounds: Normal breath sounds. No wheezing. No rhonchi. No rales.   Chest:      Chest wall: Not tender to palpatation.   Cardiovascular:      PMI at left midclavicular line. Normal rate. Regular rhythm. Normal S1. Normal S2.       Murmurs: There is no murmur.      No gallop.  No click. No rub.   Pulses:     Intact distal pulses.   Edema:     Peripheral edema absent.   Abdominal:      General: Abdomen is protuberant. Bowel sounds are normal.      Palpations: Abdomen is soft.      Tenderness: There is no abdominal tenderness.   Musculoskeletal: Normal range of motion.         General: No tenderness. Skin:     General: Skin is warm and dry.   Neurological:      General: No focal deficit present.      Mental Status: Alert and oriented to person, place and time.          Last Labs:  CBC -  Lab Results   Component Value Date    WBC 6.5 12/09/2024    HGB 12.2 (L) 12/09/2024    HCT 40.0 (L) 12/09/2024    MCV 94 12/09/2024     12/09/2024       CMP -  Lab Results   Component Value Date    CALCIUM 9.3 12/09/2024    PHOS 3.9 07/01/2024    PROT 6.5 12/09/2024    ALBUMIN 4.0 12/09/2024    AST 12 12/09/2024    ALT 11 12/09/2024    ALKPHOS 96 12/09/2024    BILITOT 0.7 12/09/2024       LIPID PANEL -   Lab Results   Component Value Date    CHOL 119 08/19/2024    TRIG 147 08/19/2024    HDL 34.5 08/19/2024    CHHDL 3.4 08/19/2024    LDLF 74 03/02/2022    VLDL 29 08/19/2024    NHDL 85 08/19/2024       Ldl=55    RENAL FUNCTION PANEL -   Lab Results   Component Value Date    GLUCOSE 197 (H) 12/09/2024     12/09/2024    K 3.6 12/09/2024     12/09/2024    CO2 26 12/09/2024    ANIONGAP 10 12/09/2024    BUN 29 (H) 12/09/2024    CREATININE 1.57 (H) 12/09/2024    GFRMALE 35 (A) 08/24/2022    CALCIUM 9.3 12/09/2024    PHOS 3.9 07/01/2024    ALBUMIN 4.0 12/09/2024       Lab Results   Component Value Date    BNP 23 04/27/2019    HGBA1C 7.7 (H)  12/09/2024    HGBA1C 6.6 (A) 08/10/2023           Lab review: I have personally reviewed the laboratory result(s)       Problem List Items Addressed This Visit       Chronic kidney disease, stage 3b (Multi)    Overview     Cr=1.43 1/2020   Cr=1.56 3/2022 - recheck BMP   Cr=1.99 8/2022 - decreased olmesartan and olmesartan then stopped  Cr=1.5   Seeing Nephrology          Coronary artery calcification seen on CAT scan - Primary    Overview     Extensive 4V cor calcification per 11/2020 chest CT   Pt with no definite angina.   12/2023 John nuc stress test NL  On ASA / statin / beta blocker   Follow         Essential hypertension    Overview     SBP borderline elevated in office-generally OK at home  No ACE/ARB or increase in hydrochlorothiazide as has renal insuff  Follow         Class 2 obesity with body mass index (BMI) of 39.0 to 39.9 in adult    Overview     Needs weight loss         Obstructive sleep apnea syndrome    Overview     Moderate - wearing CPAP intermittently         Leg edema    Overview     Suspect more peripheral than central - 1/2021 echo with Nl LVEF   Clinically better- watch salt intake / elevate legs   Encourage use of compression socks   No increase in diuretic as Cr increased         Hyperlipidemia    Overview     Warrants HI statin with DM / HTN / CAC - on atorvastatin   8/2024 LDL=55          Other Visit Diagnoses       Chest pain, atypical              Weight loss  Stay active        Corey Farris,

## 2024-12-17 ENCOUNTER — APPOINTMENT (OUTPATIENT)
Dept: PRIMARY CARE | Facility: CLINIC | Age: 74
End: 2024-12-17
Payer: MEDICARE

## 2024-12-17 VITALS
WEIGHT: 264 LBS | DIASTOLIC BLOOD PRESSURE: 74 MMHG | BODY MASS INDEX: 40.14 KG/M2 | OXYGEN SATURATION: 96 % | SYSTOLIC BLOOD PRESSURE: 132 MMHG | HEART RATE: 85 BPM

## 2024-12-17 DIAGNOSIS — J06.9 UPPER RESPIRATORY TRACT INFECTION, UNSPECIFIED TYPE: Primary | ICD-10-CM

## 2024-12-17 DIAGNOSIS — E11.9 TYPE 2 DIABETES MELLITUS WITHOUT COMPLICATION, WITHOUT LONG-TERM CURRENT USE OF INSULIN (MULTI): ICD-10-CM

## 2024-12-17 PROCEDURE — 3061F NEG MICROALBUMINURIA REV: CPT | Performed by: STUDENT IN AN ORGANIZED HEALTH CARE EDUCATION/TRAINING PROGRAM

## 2024-12-17 PROCEDURE — 3078F DIAST BP <80 MM HG: CPT | Performed by: STUDENT IN AN ORGANIZED HEALTH CARE EDUCATION/TRAINING PROGRAM

## 2024-12-17 PROCEDURE — 3075F SYST BP GE 130 - 139MM HG: CPT | Performed by: STUDENT IN AN ORGANIZED HEALTH CARE EDUCATION/TRAINING PROGRAM

## 2024-12-17 PROCEDURE — 3051F HG A1C>EQUAL 7.0%<8.0%: CPT | Performed by: STUDENT IN AN ORGANIZED HEALTH CARE EDUCATION/TRAINING PROGRAM

## 2024-12-17 PROCEDURE — 1123F ACP DISCUSS/DSCN MKR DOCD: CPT | Performed by: STUDENT IN AN ORGANIZED HEALTH CARE EDUCATION/TRAINING PROGRAM

## 2024-12-17 PROCEDURE — 99213 OFFICE O/P EST LOW 20 MIN: CPT | Performed by: STUDENT IN AN ORGANIZED HEALTH CARE EDUCATION/TRAINING PROGRAM

## 2024-12-17 PROCEDURE — 1036F TOBACCO NON-USER: CPT | Performed by: STUDENT IN AN ORGANIZED HEALTH CARE EDUCATION/TRAINING PROGRAM

## 2024-12-17 PROCEDURE — 3048F LDL-C <100 MG/DL: CPT | Performed by: STUDENT IN AN ORGANIZED HEALTH CARE EDUCATION/TRAINING PROGRAM

## 2024-12-17 RX ORDER — AZITHROMYCIN 250 MG/1
TABLET, FILM COATED ORAL
Qty: 6 TABLET | Refills: 0 | Status: SHIPPED | OUTPATIENT
Start: 2024-12-17 | End: 2024-12-22

## 2024-12-17 RX ORDER — GABAPENTIN 600 MG/1
600 TABLET ORAL 4 TIMES DAILY
Qty: 120 TABLET | Refills: 0 | Status: SHIPPED | OUTPATIENT
Start: 2024-12-17 | End: 2025-01-16

## 2024-12-17 ASSESSMENT — PATIENT HEALTH QUESTIONNAIRE - PHQ9
1. LITTLE INTEREST OR PLEASURE IN DOING THINGS: NOT AT ALL
2. FEELING DOWN, DEPRESSED OR HOPELESS: NOT AT ALL
SUM OF ALL RESPONSES TO PHQ9 QUESTIONS 1 AND 2: 0

## 2024-12-17 ASSESSMENT — ENCOUNTER SYMPTOMS: DEPRESSION: 0

## 2024-12-17 NOTE — PROGRESS NOTES
Subjective   Patient ID: Jeffry Lynn is a 74 y.o. male who presents for Sinus Problem.    HPI     Patient comes today for couple weeks of sinus pressure congestion nasal drip little bit nausea no fevers no chills no ear problems cough congestion usually 22 sinus infections you need he has a Z-Andrea which helps it.        Review of Systems   All other systems reviewed and are negative.      Objective   /74 (BP Location: Right arm, Patient Position: Sitting, BP Cuff Size: Large adult)   Pulse 85   Wt 120 kg (264 lb)   SpO2 96%   BMI 40.14 kg/m²     Physical Exam  Constitutional:       Appearance: Normal appearance.   HENT:      Head: Normocephalic and atraumatic.      Right Ear: Tympanic membrane and ear canal normal.      Left Ear: Tympanic membrane and ear canal normal.      Mouth/Throat:      Mouth: Mucous membranes are moist.      Pharynx: Oropharynx is clear.   Eyes:      Extraocular Movements: Extraocular movements intact.      Conjunctiva/sclera: Conjunctivae normal.      Pupils: Pupils are equal, round, and reactive to light.   Cardiovascular:      Rate and Rhythm: Normal rate and regular rhythm.      Pulses: Normal pulses.      Heart sounds: Normal heart sounds.   Pulmonary:      Effort: Pulmonary effort is normal.      Breath sounds: Normal breath sounds.   Abdominal:      General: Abdomen is flat. Bowel sounds are normal.      Palpations: Abdomen is soft.   Musculoskeletal:         General: Normal range of motion.      Cervical back: Normal range of motion and neck supple.   Skin:     General: Skin is warm and dry.      Capillary Refill: Capillary refill takes 2 to 3 seconds.   Neurological:      General: No focal deficit present.      Mental Status: He is alert and oriented to person, place, and time. Mental status is at baseline.   Psychiatric:         Mood and Affect: Mood normal.         Behavior: Behavior normal.         Thought Content: Thought content normal.         Judgment: Judgment  normal.       Assessment/Plan   1. Type 2 diabetes mellitus without complication, without long-term current use of insulin (Multi)  Stable no issues  - gabapentin (Neurontin) 600 mg tablet; Take 1 tablet (600 mg) by mouth 4 times a day.  Dispense: 120 tablet; Refill: 0    2. Upper respiratory tract infection, unspecified type (Primary)  - avoid steroids  flonase  - azithromycin (Zithromax) 250 mg tablet; Take 2 tablets (500 mg) by mouth once daily for 1 day, THEN 1 tablet (250 mg) once daily for 4 days. Take 2 tabs (500 mg) by mouth today, than 1 daily for 4 days..  Dispense: 6 tablet; Refill: 0

## 2024-12-17 NOTE — PROGRESS NOTES
OCCUPATIONAL THERAPY TREATMENT NOTE    Patient Name:  Jeffry Lynn   Patient MRN: [id number]  Date: 12/18/2024

## 2024-12-18 ENCOUNTER — DOCUMENTATION (OUTPATIENT)
Dept: OCCUPATIONAL THERAPY | Facility: CLINIC | Age: 74
End: 2024-12-18
Payer: MEDICARE

## 2024-12-18 ENCOUNTER — APPOINTMENT (OUTPATIENT)
Dept: OCCUPATIONAL THERAPY | Facility: CLINIC | Age: 74
End: 2024-12-18
Payer: MEDICARE

## 2024-12-18 NOTE — PROGRESS NOTES
Occupational Therapy                 Therapy Communication Note    Patient Name: Jeffry Lynn  MRN: [id number]  Department:   Room: Room/bed info not found  Today's Date: 12/18/2024     Discipline: Occupational Therapy          Missed Visit Reason: Patient cancelled due to illness     Missed Time: Cancel    Comment:

## 2024-12-21 ENCOUNTER — LAB (OUTPATIENT)
Dept: LAB | Facility: LAB | Age: 74
End: 2024-12-21
Payer: MEDICARE

## 2024-12-21 DIAGNOSIS — N25.81 SECONDARY HYPERPARATHYROIDISM OF RENAL ORIGIN (MULTI): Primary | ICD-10-CM

## 2024-12-21 DIAGNOSIS — N18.32 CHRONIC KIDNEY DISEASE, STAGE 3B (MULTI): ICD-10-CM

## 2024-12-21 LAB
25(OH)D3 SERPL-MCNC: 26 NG/ML (ref 30–100)
ALBUMIN SERPL BCP-MCNC: 4.1 G/DL (ref 3.4–5)
ANION GAP SERPL CALC-SCNC: 9 MMOL/L (ref 10–20)
APPEARANCE UR: CLEAR
BILIRUB UR STRIP.AUTO-MCNC: NEGATIVE MG/DL
BUN SERPL-MCNC: 27 MG/DL (ref 6–23)
CA-I BLD-SCNC: 1.23 MMOL/L (ref 1.1–1.33)
CALCIUM SERPL-MCNC: 9.2 MG/DL (ref 8.6–10.3)
CHLORIDE SERPL-SCNC: 105 MMOL/L (ref 98–107)
CO2 SERPL-SCNC: 28 MMOL/L (ref 21–32)
COLOR UR: NORMAL
CREAT SERPL-MCNC: 1.52 MG/DL (ref 0.5–1.3)
CREAT UR-MCNC: 154.5 MG/DL (ref 20–370)
EGFRCR SERPLBLD CKD-EPI 2021: 47 ML/MIN/1.73M*2
GLUCOSE SERPL-MCNC: 92 MG/DL (ref 74–99)
GLUCOSE UR STRIP.AUTO-MCNC: NORMAL MG/DL
KETONES UR STRIP.AUTO-MCNC: NEGATIVE MG/DL
LEUKOCYTE ESTERASE UR QL STRIP.AUTO: NEGATIVE
MICROALBUMIN UR-MCNC: 9.4 MG/L
MICROALBUMIN/CREAT UR: 6.1 UG/MG CREAT
MUCOUS THREADS #/AREA URNS AUTO: NORMAL /LPF
NITRITE UR QL STRIP.AUTO: NEGATIVE
PH UR STRIP.AUTO: 6.5 [PH]
PHOSPHATE SERPL-MCNC: 2.9 MG/DL (ref 2.5–4.9)
POTASSIUM SERPL-SCNC: 3.3 MMOL/L (ref 3.5–5.3)
PROT UR STRIP.AUTO-MCNC: NORMAL MG/DL
PTH-INTACT SERPL-MCNC: 77.6 PG/ML (ref 18.5–88)
RBC # UR STRIP.AUTO: NEGATIVE /UL
RBC #/AREA URNS AUTO: NORMAL /HPF
SODIUM SERPL-SCNC: 139 MMOL/L (ref 136–145)
SP GR UR STRIP.AUTO: 1.02
UROBILINOGEN UR STRIP.AUTO-MCNC: NORMAL MG/DL
WBC #/AREA URNS AUTO: NORMAL /HPF

## 2024-12-21 PROCEDURE — 82043 UR ALBUMIN QUANTITATIVE: CPT

## 2024-12-21 PROCEDURE — 36415 COLL VENOUS BLD VENIPUNCTURE: CPT

## 2024-12-21 PROCEDURE — 80069 RENAL FUNCTION PANEL: CPT

## 2024-12-21 PROCEDURE — 83970 ASSAY OF PARATHORMONE: CPT

## 2024-12-21 PROCEDURE — 82330 ASSAY OF CALCIUM: CPT

## 2024-12-21 PROCEDURE — 82306 VITAMIN D 25 HYDROXY: CPT

## 2024-12-21 PROCEDURE — 81001 URINALYSIS AUTO W/SCOPE: CPT

## 2024-12-21 PROCEDURE — 82570 ASSAY OF URINE CREATININE: CPT

## 2024-12-28 DIAGNOSIS — E11.9 TYPE 2 DIABETES MELLITUS WITHOUT COMPLICATION, WITHOUT LONG-TERM CURRENT USE OF INSULIN (MULTI): ICD-10-CM

## 2024-12-31 RX ORDER — PIOGLITAZONEHYDROCHLORIDE 45 MG/1
TABLET ORAL
Qty: 90 TABLET | Refills: 3 | Status: SHIPPED | OUTPATIENT
Start: 2024-12-31

## 2025-01-07 DIAGNOSIS — G43.909 MIGRAINE, UNSPECIFIED, NOT INTRACTABLE, WITHOUT STATUS MIGRAINOSUS: ICD-10-CM

## 2025-01-07 RX ORDER — TOPIRAMATE 100 MG/1
100 TABLET, FILM COATED ORAL 2 TIMES DAILY
Qty: 180 TABLET | Refills: 2 | Status: SHIPPED | OUTPATIENT
Start: 2025-01-07

## 2025-01-13 DIAGNOSIS — R60.0 LEG EDEMA: ICD-10-CM

## 2025-01-13 DIAGNOSIS — I10 ESSENTIAL HYPERTENSION: ICD-10-CM

## 2025-01-14 RX ORDER — HYDROCHLOROTHIAZIDE 25 MG/1
25 TABLET ORAL DAILY
Qty: 90 TABLET | Refills: 3 | Status: SHIPPED | OUTPATIENT
Start: 2025-01-29

## 2025-01-21 ENCOUNTER — OFFICE VISIT (OUTPATIENT)
Dept: SLEEP MEDICINE | Facility: CLINIC | Age: 75
End: 2025-01-21
Payer: MEDICARE

## 2025-01-21 VITALS
OXYGEN SATURATION: 95 % | WEIGHT: 272.13 LBS | DIASTOLIC BLOOD PRESSURE: 75 MMHG | SYSTOLIC BLOOD PRESSURE: 160 MMHG | HEART RATE: 63 BPM | HEIGHT: 68 IN | RESPIRATION RATE: 12 BRPM | TEMPERATURE: 98 F | BODY MASS INDEX: 41.24 KG/M2

## 2025-01-21 DIAGNOSIS — G47.33 OBSTRUCTIVE SLEEP APNEA SYNDROME: Primary | ICD-10-CM

## 2025-01-21 DIAGNOSIS — G47.09 OTHER INSOMNIA: ICD-10-CM

## 2025-01-21 DIAGNOSIS — I10 ESSENTIAL HYPERTENSION: ICD-10-CM

## 2025-01-21 DIAGNOSIS — R03.0 ELEVATED BLOOD PRESSURE READING: ICD-10-CM

## 2025-01-21 DIAGNOSIS — E11.9 TYPE 2 DIABETES MELLITUS WITHOUT COMPLICATION, WITHOUT LONG-TERM CURRENT USE OF INSULIN (MULTI): ICD-10-CM

## 2025-01-21 PROBLEM — E66.813 CLASS 3 SEVERE OBESITY WITH BODY MASS INDEX (BMI) OF 40.0 TO 44.9 IN ADULT: Status: ACTIVE | Noted: 2023-02-15

## 2025-01-21 PROCEDURE — 1159F MED LIST DOCD IN RCRD: CPT | Performed by: PHYSICIAN ASSISTANT

## 2025-01-21 PROCEDURE — 99214 OFFICE O/P EST MOD 30 MIN: CPT | Performed by: PHYSICIAN ASSISTANT

## 2025-01-21 PROCEDURE — 3078F DIAST BP <80 MM HG: CPT | Performed by: PHYSICIAN ASSISTANT

## 2025-01-21 PROCEDURE — G2211 COMPLEX E/M VISIT ADD ON: HCPCS | Performed by: PHYSICIAN ASSISTANT

## 2025-01-21 PROCEDURE — 3077F SYST BP >= 140 MM HG: CPT | Performed by: PHYSICIAN ASSISTANT

## 2025-01-21 PROCEDURE — 1126F AMNT PAIN NOTED NONE PRSNT: CPT | Performed by: PHYSICIAN ASSISTANT

## 2025-01-21 PROCEDURE — 1036F TOBACCO NON-USER: CPT | Performed by: PHYSICIAN ASSISTANT

## 2025-01-21 PROCEDURE — 1123F ACP DISCUSS/DSCN MKR DOCD: CPT | Performed by: PHYSICIAN ASSISTANT

## 2025-01-21 SDOH — ECONOMIC STABILITY: FOOD INSECURITY: WITHIN THE PAST 12 MONTHS, YOU WORRIED THAT YOUR FOOD WOULD RUN OUT BEFORE YOU GOT MONEY TO BUY MORE.: NEVER TRUE

## 2025-01-21 SDOH — ECONOMIC STABILITY: FOOD INSECURITY: WITHIN THE PAST 12 MONTHS, THE FOOD YOU BOUGHT JUST DIDN'T LAST AND YOU DIDN'T HAVE MONEY TO GET MORE.: NEVER TRUE

## 2025-01-21 ASSESSMENT — LIFESTYLE VARIABLES
SKIP TO QUESTIONS 9-10: 1
HOW OFTEN DO YOU HAVE SIX OR MORE DRINKS ON ONE OCCASION: NEVER
HOW MANY STANDARD DRINKS CONTAINING ALCOHOL DO YOU HAVE ON A TYPICAL DAY: 1 OR 2
HOW OFTEN DO YOU HAVE A DRINK CONTAINING ALCOHOL: 2-4 TIMES A MONTH
AUDIT-C TOTAL SCORE: 2

## 2025-01-21 ASSESSMENT — PAIN SCALES - GENERAL: PAINLEVEL_OUTOF10: 0-NO PAIN

## 2025-01-21 ASSESSMENT — COLUMBIA-SUICIDE SEVERITY RATING SCALE - C-SSRS
2. HAVE YOU ACTUALLY HAD ANY THOUGHTS OF KILLING YOURSELF?: NO
1. IN THE PAST MONTH, HAVE YOU WISHED YOU WERE DEAD OR WISHED YOU COULD GO TO SLEEP AND NOT WAKE UP?: NO
6. HAVE YOU EVER DONE ANYTHING, STARTED TO DO ANYTHING, OR PREPARED TO DO ANYTHING TO END YOUR LIFE?: NO

## 2025-01-21 ASSESSMENT — ENCOUNTER SYMPTOMS
OCCASIONAL FEELINGS OF UNSTEADINESS: 0
DEPRESSION: 0
LOSS OF SENSATION IN FEET: 0

## 2025-01-21 NOTE — PROGRESS NOTES
Patient: Jeffry Lynn    [id number]  : 1950 -- AGE 74 y.o.    Provider: Anamika Chowdhury PA-C     Location [facility] Room 8 [address] [facility] 1   Service Date: 2025              [facility]  Followup Visit Note    HISTORY OF PRESENT ILLNESS     HISTORY OF PRESENT ILLNESS   Jeffry Lynn is a 74 y.o. male with h/o sleep apnea, htn, multiple comorbidities who presents to a [facility] for followup.     PAST SLEEP HISTORY     JEFFRY has had a home sleep study in the past completed on 2020. The results were as follows:    AHI: 29 (59 central, 48 Obs, 181 hypopnea)   O2 Handy: 76%    Assessment and plan from last visit: 2024-- with Georgina Hernandez      Mr. Lynn is a 73 y.o. male and  has a past medical history of ANSELMO (acute kidney injury) (CMS/HCC), Apnea, sleep, Arthritis, BPH (benign prostatic hyperplasia), CAD (coronary artery disease), Cholestasis, CKD (chronic kidney disease), stage III (CMS/HCC), Coronary artery calcification seen on CAT scan, Diabetic neuropathy (CMS/HCC), Dyslipidemia, Essential familial hypercholesterolemia, Essential hypertension, GERD (gastroesophageal reflux disease), History of acute prostatitis, History of deep venous thrombosis, History of melena, Hyperesthesia, Hypotension, Hypothyroidism, Knee arthropathy, Left shoulder tendinitis, Lumbar canal stenosis, Migraine with aura, not intractable, without status migrainosus, Multiple lung nodules, Obesity, morbid, BMI 40.0-49.9 (CMS/HCC), Obstructive sleep apnea, Paresthesia, Personal history of gout, Post-nasal drip, Rickets, active, Sialoadenitis of submandibular gland, Snores, SOBOE (shortness of breath on exertion), Spondylolisthesis of lumbar region, Type 2 diabetes mellitus (CMS/HCC), Unilateral primary osteoarthritis, left knee, Urine incontinence, and Vitamin D deficiency.         LORENA:  Continue at same settings  Continue to monitor rAHI vs  leaks  Consider change in mask if leak continues and affects rAHI  Supply order to [facility].  Follow up 1 year or sooner with concerns         Current History    On today's visit, the patient reports doing well overall with cpap, here for annual visit.     Without cpap there is some snoring and sleepiness and pauses in breathing. With cpap, he experiences Less migraines. Sleepiness and snoring improved.     Reports his children live in [place]. He visited them around the holidays and did not take his device. He otherwise uses it regularly and noted positive benefit as mentioned above when he came back and began to use it again.     Does report his wife passed away last summer. He has been trying to get out, meets up with some of her friends and this has been helpful to him.     Does state it can be very hard to sleep sometimes still since her loss. Not interested in any medications.     Sleep schedule:   Bed time: MN   Sleep latency: watching TV/movie -varies   Nocturnal Awakenings: usually none - back to sleep quickly if he does get up   Wake up: 6:30/7A   TST: 5-6 hours     Naps: 2-3 times per week, does nap for ~45 minutes after some house hold tasks - timing varies-usually in the afternoons. Variably refreshing. Occasionally dozes in the evening.    ESS:  2    REVIEW OF SYSTEMS     REVIEW OF SYSTEMS  See HPI; all other ROS were reviewed and negative for compliant      ALLERGIES AND MEDICATIONS     ALLERGIES  No Known Allergies    MEDICATIONS: He has a current medication list which includes the following prescription(s): aspirin - Take 1 tablet (81 mg) by mouth once daily, atorvastatin - Take 1 tablet (40 mg) by mouth once daily. Do not fill before November 14, 2024, onetouch verio test strips - TEST TWICE DAILY AS NEEDED, trulicity - Inject 0.75 mg under the skin 1 (one) time per week, esomeprazole - Take 1 capsule (20 mg) by mouth once daily in the morning. Take before meals. Do not open capsule, fluticasone  - ADMINISTER 1 SPRAY INTO EACH NOSTRIL ONCE DAILY, furosemide - Take 1 tablet (20 mg) by mouth once daily, gabapentin - Take 1 tablet (600 mg) by mouth 4 times a day, glimepiride - Take 1 tablet (2 mg) by mouth once daily in the morning. Take before meals, [START ON 1/29/2025] hydrochlorothiazide - Take 1 tablet (25 mg) by mouth once daily. Do not fill before January 29, 2025, loratadine - Take 1 tablet (10 mg) by mouth once daily, metformin xr - Take 1 tablet (500 mg) by mouth 2 times daily (morning and late afternoon). Do not crush, chew, or split, metoprolol succinate xl - Take 1 tablet (50 mg) by mouth once daily. Do not fill before November 4, 2024, centrum - Take 1 tablet by mouth once daily, onetouch delica plus lancet - TEST TWICE DAILY AND AS NEEDED, onetouch verio flex meter - use as directed, pioglitazone - TAKE 1 TABLET BY MOUTH ONCE  DAILY, tamsulosin, topiramate - TAKE 1 TABLET BY MOUTH TWICE A DAY, and rizatriptan, and the following Facility-Administered Medications: tc-99m tetrofosmin and tc-99m tetrofosmin.    PAST MEDICAL HISTORY : He  has a past medical history of ANSELMO (acute kidney injury) (CMS-HCC), Apnea, sleep, Arthritis, BPH (benign prostatic hyperplasia), CAD (coronary artery disease), Cholestasis (CMS-HCC), CKD (chronic kidney disease), stage III ([facility]), Coronary artery calcification seen on CAT scan, Diabetic neuropathy (Multi), Dyslipidemia, Essential familial hypercholesterolemia, Essential hypertension, GERD (gastroesophageal reflux disease), History of acute prostatitis, History of deep venous thrombosis, History of melena, Hyperesthesia, Hypotension, Hypothyroidism, Knee arthropathy, Left shoulder tendinitis, Lumbar canal stenosis, Migraine with aura, not intractable, without status migrainosus, Multiple lung nodules, Obesity, morbid, BMI 40.0-49.9 (Multi), Obstructive sleep apnea, Paresthesia, Personal history of gout, Post-nasal drip, Rickets, active, Sialoadenitis of submandibular  "gland, Snores, SOBOE (shortness of breath on exertion), Spondylolisthesis of lumbar region, Type 2 diabetes mellitus, Unilateral primary osteoarthritis, left knee, Urine incontinence, and Vitamin D deficiency.    PAST SURGICAL HISTORY: He  has a past surgical history that includes Lumbar fusion; Knee surgery; Umbilical hernia repair; Tonsillectomy (1950); Colonoscopy; and Carpal tunnel release.     FAMILY HISTORY: No changes since previous visit. Otherwise non-contributory as charted.     SOCIAL HISTORY  He  reports that he quit smoking about 47 years ago. His smoking use included cigarettes. He has never used smokeless tobacco. He reports current alcohol use. He reports that he does not use drugs.       PHYSICAL EXAM     VITAL SIGNS: /75   Pulse 63   Temp 36.7 °C (98 °F) (Temporal)   Resp 12   Ht 1.727 m (5' 8\")   Wt 123 kg (272 lb 2 oz)   SpO2 95%   BMI 41.38 kg/m²        PREVIOUS WEIGHTS:  Wt Readings from Last 3 Encounters:   01/21/25 123 kg (272 lb 2 oz)   12/17/24 120 kg (264 lb)   12/12/24 124 kg (273 lb 3.2 oz)       Constitutional: Alert and oriented, cooperative, no acute distress  Head: Normocephalic, atraumatic   Cranial Features: No abnormal craniofacial features  Neck: Supple. Trachea midline.  Pulmonary: Non-labored breathing, speaks in full sentences. No cough.    Cardiac: regular rate   Extremities: No clubbing, no edema  Neuromuscular: Cranial nerves grossly intact, no focal deficits      RESULTS/DATA     Bicarbonate (mmol/L)   Date Value   12/21/2024 28   12/09/2024 26   08/19/2024 30       PAP Adherence  A PAP adherence download was obtained and data was reviewed personally today in clinic.      ASSESSMENT/PLAN     Mr. Lynn is a 74 y.o. male and he returns in followup to the [facility] for LORENA.    Problem List, Orders, Assessment, Recommendations:  Problem List Items Addressed This Visit             ICD-10-CM    Essential hypertension I10     " -elevated today  -has not yet taken his BP medication, will take it when he gets home with breakfast  -follow up closely with PCP          Obstructive sleep apnea syndrome - Primary G47.33     -was out of town around holidays; back to regular use  -Lexa controlled, experiences benefit  -update supply order  -avoid drowsy driving          Relevant Orders    Positive Airway Pressure (PAP) Therapy    Other insomnia G47.09     -due to some grief from loss of wife last summer  -reviewed insomnia tips  -not interested in any sleep aid          Elevated blood pressure reading R03.0     -asymptomatic  -close follow up with pcp  -encourage regular cpap use                   Disposition    Return to clinic in 6 months

## 2025-01-21 NOTE — PATIENT INSTRUCTIONS
[facility] Sleep Medicine  [address] [address]  [address] [facility] [address]       NAME: Jeffry Lynn   DATE: 01/21/25    Your Sleep Provider Today: Anamika Chowdhury PA-C  Your Primary Care Physician: Jose iWll, DO   Your Referring Provider: No ref. provider found    DIAGNOSIS:   1. Essential hypertension        2. Obstructive sleep apnea syndrome            Thank you for coming to the Sleep Medicine Clinic today! Your sleep medicine provider today was: Anamika Chowdhury PA-C Below is a summary of your treatment plan, other important information, and our contact numbers:      TREATMENT PLAN     INSOMNIA, or trouble falling asleep or staying asleep, can be caused by many different things such as untreated sleep apnea, anxiety, depression, stress, poor sleep habits and other medical conditions or medications. The best way to treat insomnia is to treat the cause. In general, we can all benefit from better sleep hygiene. Some recommendations to help you improve your sleep hygiene so you can fall asleep, stay asleep, and wake up felling refreshed include:    1. Wake up and get out of bed the same time every day, even on weekends or non-work days. Whether you have good or poor sleep, waking up at the same time every day is important.      2. Go to bed when you are sleepy, but not before your goal bedtime. Long periods of time in bed will lead to fragmented, shallow, broken sleep.     3. Use the bed for sleep and intimacy only. Do not watch TV, eat, read or use phone/laptop in bed. Keeping sleep as the only activity in bed will help re-associate bed=sleep.    4. Get up when you can't sleep (greater than 15 minutes). When you are unable to sleep, exit the bed and go to another room or chair in bedroom, do something relaxing/distracting/non-stimulating until you feel sleepy enough to fall back asleep.     5. Avoid napping during the day. Napping, particularly in the late  afternoon or early evening may interfere with your night's sleep.    6. Create a buffer zone. This is a quiet time prior to bedtime, typically 30-60 mins. is beneficial for most people. During this time, you should do things that are enjoyable, relaxing and not necessarily goal-oriented.    7. Don't worry or plan in bed. If you are worrying, planning, feeling anxious or can't shut off your thoughts, get up and stay out of bed until you have quieted your mind. Make a list, write your worries down for the morning time.    8. Other Healthy Sleep Habits   -Turn the clock around   -Limit caffeine and consume before noon   -Limit alcohol and avoid within 2 hours of bedtime   -Exercise regularly but not close to bedtime   -Keep bedroom quiet, dark and cool   -Avoid eating a heavy meal close to bedtime      Instructions - Common LORENA Recs: - For your sleep apnea, continue to use your PAP every night and use it whenever you are sleeping.   - Avoid alcohol or sedatives several hours prior to sleeping.   - Get additional supplies for your PAP (e.g., mask, hose, filters) every 3 months or as your insurance allows from your [employer] company. Replacement cushions for your PAP mask can be requested monthly if airseals are an issue.  - Remember to clean your mask, tubings, and water chamber regularly as instructed.  - Avoid driving or operating heavy machinery when drowsy. A person driving while sleepy is five (5) times more likely to have an accident. If you feel sleepy, pull over and take a short power nap (sleep for less than 30 minutes). Otherwise, ask somebody to drive you.      Return to clinic 4-6 months       IMPORTANT INFORMATION     Call 911 for medical emergencies.  Our offices are generally open from Monday-Friday, 9 am - 5 pm.  If you need to get in touch with me, you may either call me and my team(number is below) or you can use [employer].  If a referral for a test, for CPAP, or for another specialist was made, and you have  not heard about scheduling this within a week, please call scheduling at [phone] ([phone]).  If you are unable to make your appointment for clinic or an overnight study, kindly call the office at least 48 hours in advance to cancel and reschedule.  If you are on CPAP, please bring your device's card or the device to each clinic appointment.   There are no supporting services by either the sleep doctors or their staff on weekends and Holidays, or after 5 PM on weekdays.   If you have been asked to come to a sleep study, make sure you bring toiletries, a comfy pillow, and any nighttime medications that you may regularly take. Also be sure to eat dinner before you arrive. We generally do not provide meals.      PRESCRIPTIONS     We require 7 days advanced notice for prescription refills. If we do not receive the request in this time, we cannot guarantee that your medication will be refilled in time.      IMPORTANT PHONE NUMBERS     Sleep Medicine Clinic Fax: [phone]  Appointments (for Adult Sleep Clinic): [phone] - option 2  Appointments (For Sleep Studies): [phone] - option 3  [employer] ([employer]): [phone]  Behavioral Sleep Medicine: [phone]  Sleep Surgery: [phone]  ENT (Otolaryngology): [phone]  Headache Clinic (Neurology): [phone]  Neurology: [phone]  Psychiatry: [phone]  Pulmonary Function Testing (PFT) [place]: [phone]  Pulmonary Medicine: [phone]    [employer] ([employer]): [phone]  [place] ([employer]): [phone]  [facility] ([facility]): [phone]      OUR ADULT SLEEP MEDICINE TEAM   Please do not hesitate to call the office or sleep nurse with any questions between appointments:    Adult Sleep Nurses (Gloria Peralta RN and Angeles Ashraf RN):  For clinical questions and refilling prescriptions: [phone]  Email sleep diaries and other documents at: [email or web address]    Adult Sleep  Medicine Secretaries:  Mariah Steiner (For Ander/Zuniga/Krise/Strohl/Yeh/Hernandez):   P: [phone]  F: [phone]  Susanna Ramirez (For Smyth/Carly): P: [phone]  Fax: [phone]  Kristin Villegas (For Jurcevic/Blank): P: [phone]  F: [phone]  Patricia Hellen (For Deanna): P: [phone]  F: [phone]  Rayna Raya (For Mary/Mary/Zakhary): P: [phone]  F: [phone]  Hina Pimentel (For Gonzalez/Bello): P: [phone]  F: [phone]     Adult Sleep Medicine Advanced Practice Providers:  Jay Gonzales ([place], [place])  Radha Hernandez ([facility])  Dee Carroll CNP ([place], [place], [place])  Janell Chowdhury CNP ([place], [place], [place])  Britta Bello CNP ([place], [place])        OUR SLEEP TESTING LOCATIONS     Our team will contact you to schedule your sleep study, however, you can contact us as follow:  Main Phone Line (scheduling only): [phone]), option 3  Adult and Pediatric Locations  [facility] (6 years and older): [facility] - 4th floor ([facility]) After hours line: [phone]  [facility] (Main campus: All ages): [facility], 6th floor. After hours line: [phone]   [place] (5 years and older; younger considered on case-by-case basis): [address]; [address], [address]. Scheduling  After hours line: [phone]   [place] (6 years and older): [address] ; [address]; [address]   [place] (6 years and older): [address] [facility]  After hours line: [phone]   [place] (13 years and older) in [place]: [address], 2nd floor  After hours line: [phone]   [place] (13 year and older): [address]  After hours line: [phone]     Adult Only Locations:   [place] (18 years and older): [address] [facility], 2nd floor   [place] (18 years and older): [address] [facility]  [address]; 4th floor  After hours line: [phone]   [place] (18 years and older) at [place]: [address] [place] [address]  After hours line: [phone]          CONTACTING YOUR SLEEP MEDICINE PROVIDER     Send a message directly to your provider through \"My Chart\", which is the email service through your  Records Account: [email or web address]   Call [phone] and leave a message. One of the administrative assistants will forward the message to your sleep medicine provider through \"My Chart\" and/or email.     Your sleep medicine provider for this visit was: Anamika Chowdhury PA-C    "

## 2025-01-21 NOTE — ASSESSMENT & PLAN NOTE
-due to some grief from loss of wife last summer  -reviewed insomnia tips  -not interested in any sleep aid

## 2025-01-21 NOTE — ASSESSMENT & PLAN NOTE
-elevated today  -has not yet taken his BP medication, will take it when he gets home with breakfast  -follow up closely with PCP

## 2025-01-21 NOTE — ASSESSMENT & PLAN NOTE
-was out of town around holidays; back to regular use  -rAhI controlled, experiences benefit  -update supply order  -avoid drowsy driving

## 2025-01-22 RX ORDER — GABAPENTIN 600 MG/1
600 TABLET ORAL 4 TIMES DAILY
Qty: 120 TABLET | Refills: 0 | Status: SHIPPED | OUTPATIENT
Start: 2025-01-22 | End: 2025-02-21

## 2025-01-23 NOTE — PROGRESS NOTES
OCCUPATIONAL THERAPY TREATMENT NOTE    Patient Name:  Jeffry Lynn   Patient MRN: [id number]  Date: 1/24/2025  Time Calculation  Start Time: 0800  Stop Time: 0840  Time Calculation (min): 40 min    Insurance:  Visit number: 21  Insurance Type: Payor: UNITED HEALTHCARE MEDICARE / Plan: UNITED HEALTHCARE MEDICARE / Product Type: *No Product type* /   Authorization or Plan of Care date Range: med necessity 100% covered   Copay:NA  Referred by: Zia Lawrence MD        OT Therapeutic Procedures Time Entry  Manual Therapy Time Entry: 10  Therapeutic Exercise Time Entry: 15  OT Modalities Time Entry  Whirlpool Time Entry: 15                  General:  Reason for visit: left wrist sprain (S-L ligament injury)  Referred by: Dr. Zia Lawrence    Type of surgery: No surgery found  Date of surgery: No surgery found  Days since surgery: No surgery found    Current Problem:         Problem List Items Addressed This Visit             ICD-10-CM    Wrist pain, acute, left M25.532         Assessment:  Progress towards functional goals: Reduce swelling  Response to interventions:  Since last measurement, Jeffry has achieved all established goals and will continue with strengthening HEP.  He will be discharged from skilled OT at this time and will contact OT if needed.    Justification for continued skilled care:  Patient will be discharged from skilled OT at this time for left hand and will continue with HEP    Plan:  Discharge from occupational therapy    Subjective:  Patient has not been seen in OT since 12/12/24 as he has been out of town visiting his family.  He reports using his hand more for heavier household activities with no signs of clicking.      Progress towards functional goal:  Reduce swelling and Reduce pain level  Pain Location left wrist/hand  Pain (0-10): 0   HEP adherence / understanding: compliance with the instructed home exercises.    Precautions:  Fall Risk: None    Precautions  "listed: none    Therapy diagnoses:   1. Wrist pain, acute, left  Follow Up In Occupational Therapy            Objective:  Measured 8/20/24, 9/30/24, 10/24/24, 11/21/24, 1/25/25  AROM:  III Digit:  10/92   -4/ 90 8/78 SERVIN 274      Strength:  Right  48#  Left  52#   Right lat pinch 12#  Left lat pinch 13.5#  Right 3pt pinch 9.5#  Left 3pt pinch 11#     Outcome Measures:  OT Adult Other Outcome Measures  Other Outcome Measures: Quick Dash 20  (20.45%)    Treatment Performed: (\"NP\" = Not Performed)     Therapeutic Exercise:15 MIN  -objective measures of AROM, strength and functional outcomes  Therapeutic Activities: NP    Manual Therapy: 10 min  -joint traction and stretches to digits of left hand in all planes    Neuromuscular Re-Education: NP  -   Modalities: 15 min  - fluidotherapy with AROM 15 min    Education: Reviewed home exercise program.    Goals:  Patient will present with a pain score of 1/10 in left wrist and hand.   Goal achieved 1/24/25     Patient to increase left wrist flexion AROM by 10 degrees in order to improve independent performance in daily activities.  Goal achieved 9/30/24     Patient will improve left  strength by 15lb to improve performance in lifting and grasping tasks.   Goal achieved 1/24/25     Patient to improve QuickDASH score to at or below 20% to increase independency in ADLs and IADLs.  Goal achieved 1/24/25     Patient will report understanding of home program, demonstrate independence and verbalize precautions Goal achieved 1/24/25     Patient will report follow through with wearing of orthosis as instructed by therapist.Goal achieved 9/30/24  "

## 2025-01-24 ENCOUNTER — APPOINTMENT (OUTPATIENT)
Dept: OCCUPATIONAL THERAPY | Facility: CLINIC | Age: 75
End: 2025-01-24
Payer: MEDICARE

## 2025-01-24 DIAGNOSIS — M25.532 WRIST PAIN, ACUTE, LEFT: ICD-10-CM

## 2025-01-24 PROCEDURE — 97110 THERAPEUTIC EXERCISES: CPT | Mod: GO | Performed by: OCCUPATIONAL THERAPIST

## 2025-01-24 PROCEDURE — 97022 WHIRLPOOL THERAPY: CPT | Mod: GO | Performed by: OCCUPATIONAL THERAPIST

## 2025-01-24 PROCEDURE — 97140 MANUAL THERAPY 1/> REGIONS: CPT | Mod: GO | Performed by: OCCUPATIONAL THERAPIST

## 2025-01-28 ENCOUNTER — OFFICE VISIT (OUTPATIENT)
Dept: PRIMARY CARE | Facility: CLINIC | Age: 75
End: 2025-01-28
Payer: MEDICARE

## 2025-01-28 VITALS
WEIGHT: 272 LBS | OXYGEN SATURATION: 96 % | HEART RATE: 68 BPM | DIASTOLIC BLOOD PRESSURE: 80 MMHG | SYSTOLIC BLOOD PRESSURE: 144 MMHG | BODY MASS INDEX: 41.36 KG/M2

## 2025-01-28 DIAGNOSIS — M19.90 OSTEOARTHRITIS, UNSPECIFIED OSTEOARTHRITIS TYPE, UNSPECIFIED SITE: Primary | ICD-10-CM

## 2025-01-28 PROCEDURE — 3079F DIAST BP 80-89 MM HG: CPT | Performed by: STUDENT IN AN ORGANIZED HEALTH CARE EDUCATION/TRAINING PROGRAM

## 2025-01-28 PROCEDURE — 1036F TOBACCO NON-USER: CPT | Performed by: STUDENT IN AN ORGANIZED HEALTH CARE EDUCATION/TRAINING PROGRAM

## 2025-01-28 PROCEDURE — 1123F ACP DISCUSS/DSCN MKR DOCD: CPT | Performed by: STUDENT IN AN ORGANIZED HEALTH CARE EDUCATION/TRAINING PROGRAM

## 2025-01-28 PROCEDURE — 99213 OFFICE O/P EST LOW 20 MIN: CPT | Performed by: STUDENT IN AN ORGANIZED HEALTH CARE EDUCATION/TRAINING PROGRAM

## 2025-01-28 PROCEDURE — 1159F MED LIST DOCD IN RCRD: CPT | Performed by: STUDENT IN AN ORGANIZED HEALTH CARE EDUCATION/TRAINING PROGRAM

## 2025-01-28 PROCEDURE — 3077F SYST BP >= 140 MM HG: CPT | Performed by: STUDENT IN AN ORGANIZED HEALTH CARE EDUCATION/TRAINING PROGRAM

## 2025-01-28 NOTE — PROGRESS NOTES
Subjective   Patient ID: Jeffry Lynn is a 74 y.o. male who presents for Hand Problem (RT/Hard time closing it /X3 months/Getting a little worse now).    HPI     Right hand pain.  He states he is unable make a fist his last tightness in his hands and general aches all over.  He does have arthritis in his left hand he did have occupational therapy in the past for this.  Does help but cannot make a fist with either hand.  Prescription because [place] of his left hand.  She has come back to with surgery and Occupational Therapy.  [facility]      Review of Systems   All other systems reviewed and are negative.      Objective   /80 (BP Location: Right arm, Patient Position: Sitting)   Pulse 68   Wt 123 kg (272 lb)   SpO2 96%   BMI 41.36 kg/m²     Physical Exam  Constitutional:       Appearance: Normal appearance.   HENT:      Head: Normocephalic and atraumatic.      Right Ear: Tympanic membrane and ear canal normal.      Left Ear: Tympanic membrane and ear canal normal.      Mouth/Throat:      Mouth: Mucous membranes are moist.      Pharynx: Oropharynx is clear.   Eyes:      Extraocular Movements: Extraocular movements intact.      Conjunctiva/sclera: Conjunctivae normal.      Pupils: Pupils are equal, round, and reactive to light.   Cardiovascular:      Rate and Rhythm: Normal rate and regular rhythm.      Pulses: Normal pulses.      Heart sounds: Normal heart sounds.   Pulmonary:      Effort: Pulmonary effort is normal.      Breath sounds: Normal breath sounds.   Abdominal:      General: Abdomen is flat. Bowel sounds are normal.      Palpations: Abdomen is soft.   Musculoskeletal:         General: Normal range of motion.      Cervical back: Normal range of motion and neck supple.   Skin:     General: Skin is warm and dry.      Capillary Refill: Capillary refill takes 2 to 3 seconds.   Neurological:      General: No focal deficit present.      Mental Status: He is alert and oriented to person, place,  and time. Mental status is at baseline.   Psychiatric:         Mood and Affect: Mood normal.         Behavior: Behavior normal.         Thought Content: Thought content normal.         Judgment: Judgment normal.       Assessment/Plan   1. Osteoarthritis, unspecified osteoarthritis type, unspecified site (Primary)    - XR hand right 3+ views; Future  - Referral to Occupational Therapy; Future  -Get x-ray to evaluate for arthritis recommend occupational therapy to help with his  strength.  Follows fails to send back to orthopedic surgery.

## 2025-01-30 ENCOUNTER — APPOINTMENT (OUTPATIENT)
Dept: PRIMARY CARE | Facility: CLINIC | Age: 75
End: 2025-01-30
Payer: MEDICARE

## 2025-02-05 DIAGNOSIS — E11.9 TYPE 2 DIABETES MELLITUS WITHOUT COMPLICATION, WITHOUT LONG-TERM CURRENT USE OF INSULIN (MULTI): ICD-10-CM

## 2025-02-06 RX ORDER — GABAPENTIN 600 MG/1
600 TABLET ORAL 4 TIMES DAILY
Qty: 120 TABLET | Refills: 11 | Status: SHIPPED | OUTPATIENT
Start: 2025-02-06

## 2025-02-10 ENCOUNTER — EVALUATION (OUTPATIENT)
Dept: OCCUPATIONAL THERAPY | Facility: CLINIC | Age: 75
End: 2025-02-10
Payer: MEDICARE

## 2025-02-10 DIAGNOSIS — M19.90 OSTEOARTHRITIS, UNSPECIFIED OSTEOARTHRITIS TYPE, UNSPECIFIED SITE: ICD-10-CM

## 2025-02-10 DIAGNOSIS — M25.641 DECREASED RANGE OF MOTION OF FINGER OF RIGHT HAND: ICD-10-CM

## 2025-02-10 DIAGNOSIS — M79.641 HAND PAIN, RIGHT: Primary | ICD-10-CM

## 2025-02-10 PROCEDURE — 97035 APP MDLTY 1+ULTRASOUND EA 15: CPT | Mod: GO | Performed by: OCCUPATIONAL THERAPIST

## 2025-02-10 PROCEDURE — 97022 WHIRLPOOL THERAPY: CPT | Mod: GO | Performed by: OCCUPATIONAL THERAPIST

## 2025-02-10 PROCEDURE — 97165 OT EVAL LOW COMPLEX 30 MIN: CPT | Mod: GO | Performed by: OCCUPATIONAL THERAPIST

## 2025-02-10 ASSESSMENT — ENCOUNTER SYMPTOMS
DEPRESSION: 0
OCCASIONAL FEELINGS OF UNSTEADINESS: 0
LOSS OF SENSATION IN FEET: 0

## 2025-02-10 NOTE — PROGRESS NOTES
Occupational Therapy Evaluation    Patient Name:  Jeffry Lynn   Patient MRN: [id number]  Date: 2/10/2025  Time Calculation  Start Time: 0300  Stop Time: 0342  Time Calculation (min): 42 min    OT Evaluation Time Entry  OT Evaluation (Low) Time Entry: 15     OT Modalities Time Entry  Ultrasound Time Entry: 10  Whirlpool Time Entry: 15                ASSESSMENT:  Patient was referred to occupational therapy for an evaluation and treatment s/p right hand pain/OA. OT evaluation completed this date.  Patient's main functional deficits include unable to hold household objects in right hand, unable to to fasten buttons, difficulty using knife and fork, and open jars.  Patient would benefit from skilled OT in order to increase right hand range of motion, right  and pinch strength and reduce pain levels.  Patient was educated on edema control and use of heat for HEP to address these deficits. Patient verbalizes good understanding of HEP.    PLAN:       OT intervention plan includes: education/instruction, home program, manual therapy, therapeutic activities, therapeutic exercises, ultrasound and fluidotherapy.  Frequency and duration: 1 time(s) a week, for 6 weeks.   Potential to achieve rehab goals is good.    Plan of care was developed with input and agreement by the patient.     Insurance:  Visit number: 1 of   Insurance Type: Payor: [employer] MEDICARE / Plan: UNITED HEALTHCARE MEDICARE / Product Type: *No Product type* /   Authorization or Plan of Care date Range: VISITS ARE MED NEC NEEDS AUTH [employer] PAYS % NO DED   Copay: NA  Referred by: Jose Will DO     SUBJECTIVE:  Patient is a 74 year old who attends OT evaluation today after completing OT in this clinic for his left hand. he returns today for an OT evaluation of his right hand as it has been progressively worsening over the past 3 month and he is unable to make a full fist.  He had good outcomes with OT in his left hand and requested  OT for his right hand.    he is RHD   his chief complaint is pain in right hand and inability to make a fist.  his goal for Occupational Therapy is to reduce pain and improve function of the right hand.     he lives alone and is retired.    General:  Reason for visit: right hand OA/pain  Referred by: Dr. Will    Type of surgery: No surgery found  Date of surgery: No surgery found  Days since surgery: No surgery found        Current Problem:         Problem List Items Addressed This Visit             ICD-10-CM    Hand pain, right - Primary M79.641    Relevant Orders    Follow Up In Occupational Therapy    Decreased range of motion of finger of right hand M25.641     Other Visit Diagnoses         Codes    Osteoarthritis, unspecified osteoarthritis type, unspecified site     M19.90            Medical Screening:       Reviewed medical history form with patient and medical screening assessed.        Medical History Form scanned into chart    Precautions:         Fall Risk: None         Denies: Cancer and Pacemaker        Past Surgical history: NA        Past Medical history: DM II, HBP, headaches, kidney disease         Pain Assessment:      Pain Assessment: 0-10      Pain (0-10): 7       Pain Location right hand III and IV digits    OBJECTIVE:  Active range of motion:  Right Wrist:  - Flexion: 55  - Extension: 70  - Rd dev: 18  - Ul dev: 30     II Digit:  2/66 2/88 2/64  SERVIN 224  III digit:  6/72 6/90 2/74  SERVIN 250  IV digit:  10/74  8/90  10/70   SERVIN 262  V digit:   8/80   4/84  10/62   SERVIN 248    Strength:  Right  35# with pain  Left  65#   Right 3pt pinch 8# with pain  Left 3pt pinch 12#     Outcome Measures:  OT Adult Other Outcome Measures  Other Outcome Measures: Quick Dash 44  (75%)      Goals:  Patient will present with a pain score of 1/10 in right wrist and hand.     Patient to increase right wrist flexion AROM by 10 degrees in order to improve independent performance in daily activities.  "    Patient to increase right hand digits to 250 SERVIN in order to improve independent performance in daily activities requiring a fist.      Patient will improve right  strength by 15lb to improve performance in lifting and grasping tasks.      Patient to improve QuickDASH score to at or below 20% to increase independency in ADLs and IADLs.      Patient will report understanding of home program, demonstrate independence and verbalize precautions.       Treatment Performed: (\"NP\" = Not Performed)     Treatment:  Low Complex OT Eval: 15 min    Therapeutic Exercise:  -   Therapeutic Activities:   -   Manual Therapy:  -   Neuromuscular Re-Education:   -   Modalities: 25 min  - Fluidotherapy with AROM 15 min right hand  - US to right hand (volar and dorsal)   3Mhz, 1.0W/cm2, continuous    Education: Discussed significance of applying heat to reduce pain.  "

## 2025-02-14 ENCOUNTER — TREATMENT (OUTPATIENT)
Dept: OCCUPATIONAL THERAPY | Facility: CLINIC | Age: 75
End: 2025-02-14
Payer: MEDICARE

## 2025-02-14 DIAGNOSIS — M79.641 HAND PAIN, RIGHT: Primary | ICD-10-CM

## 2025-02-14 DIAGNOSIS — M25.641 DECREASED RANGE OF MOTION OF FINGER OF RIGHT HAND: ICD-10-CM

## 2025-02-14 PROCEDURE — 97035 APP MDLTY 1+ULTRASOUND EA 15: CPT | Mod: GO

## 2025-02-14 PROCEDURE — 97022 WHIRLPOOL THERAPY: CPT | Mod: GO

## 2025-02-14 PROCEDURE — 97140 MANUAL THERAPY 1/> REGIONS: CPT | Mod: GO

## 2025-02-14 NOTE — PROGRESS NOTES
OCCUPATIONAL THERAPY TREATMENT NOTE    Patient Name:  Jeffry Lynn   Patient MRN: [id number]  Date: 2025  Time Calculation  Start Time: 1151  Stop Time: 1232  Time Calculation (min): 41 min    Insurance:  Visit number: 1 of 6   Insurance Type: Payor: [facility] MEDICARE / Plan: UNITED HEALTHCARE MEDICARE / Product Type: *No Product type* /   Authorization or Plan of Care date Range: AUTH FOR 6 V 2/10--   Copay:NA  Referred by: Jose Will DO        OT Therapeutic Procedures Time Entry  Manual Therapy Time Entry: 16  OT Modalities Time Entry  Ultrasound Time Entry: 10  Whirlpool Time Entry: 15                  General:  Reason for visit: right hand OA/pain  Referred by: Dr. Will    Current Problem:         Problem List Items Addressed This Visit             ICD-10-CM    Hand pain, right - Primary M79.641    Decreased range of motion of finger of right hand M25.641           Assessment:  Progress towards functional goals: Improved mobility in digits III and V  Response to interventions:  Tenderness with palpation to volar aspect digit II MCP. Mild edema noted to R hand. Stiffness with light stretching to digits II and IV. Isotoner compression glove provided for R hand to reduce swelling. Therapist instructed patient to wear glove throughout the day for edema and pain reduction.   Justification for continued skilled care: To address remaining functional, objective and subjective deficits to allow them to return to full independence with ADLs. Assess effectiveness of HEP. Modify and progress home exercise program. Reduce pain to improve function. Reduce swelling to improve joint ROM and reduce muscular inhibition.    Plan:  Progress exercises as tolerated to improve overall UE strength and performance in daily activities , Continued education of techniques such as heat to decrease joint stiffness and muscle tightness., Manual therapy to  muscle tightness and improve  "joint mobility. , and Modalities as needed to address symptoms.    Subjective:  Patient reports he has experienced severe achy pain in the R hand. States to applies heat and biofreeze to the R hand every once and a while.       Progress towards functional goal:  Reduce swelling and Reduce pain level  Pain Location: R hand  Pain (0-10): 8; described as achy   HEP adherence / understanding: compliance with the instructed home exercises.    Precautions:  Fall Risk: None    Precautions listed: none    Therapy diagnoses:   1. Hand pain, right  Follow Up In Occupational Therapy      2. Decreased range of motion of finger of right hand            Objective:  Measured 8/20/24, 9/30/24, 10/24/24, 11/21/24    Treatment Performed: (\"NP\" = Not Performed)     Therapeutic Exercise:NP    Therapeutic Activities: NP    Manual Therapy: 16 mins  - STM to dorsal and palmar aspect of hand and digits  - Light traction and stretching to all digits in all planes   - Provided R isotoner fingerless compression glove     Neuromuscular Re-Education: NP  -   Modalities: 25 min  - fluidotherapy with AROM 15 min  - US to volar aspect of hand (5 mins) and dorsal aspect of hand (5 mins)- 3Mhz, 1.0W/cm2, 10 min continuous     Education: Reviewed home exercise program.    "

## 2025-03-03 NOTE — PROGRESS NOTES
"                              OCCUPATIONAL THERAPY TREATMENT NOTE    Patient Name:  Jeffry Lynn   Patient MRN: [id number]  Date: 3/5/2025  Time Calculation  Start Time: 1015  Stop Time: 1100  Time Calculation (min): 45 min    Insurance:  Visit number: 3 of 6   Insurance Type: Payor: [facility] MEDICARE / Plan: [employer] MEDICARE / Product Type: *No Product type* /   Authorization or Plan of Care date Range: AUTH FOR 6 V 2/10--   Copay:NA  Referred by: Jose Will DO        OT Therapeutic Procedures Time Entry  Manual Therapy Time Entry: 16  OT Modalities Time Entry  Ultrasound Time Entry: 10  Whirlpool Time Entry: 15                  General:  Reason for visit: right hand OA/pain  Referred by: Dr. Will    Current Problem:         Problem List Items Addressed This Visit             ICD-10-CM    Hand pain, right - Primary M79.641    Decreased range of motion of finger of right hand M25.641           Assessment:  Progress towards functional goals: Improved mobility in digits III and V  Response to interventions:  Edema and tenderness in volar II and IV digit MP joints this visit.  Inability to achieve full II digit passive flexion with OT stretches this visit due to joint tightness and swelling.   Justification for continued skilled care: To address remaining functional, objective and subjective deficits to allow them to return to full independence with ADLs. Assess effectiveness of HEP. Modify and progress home exercise program. Reduce pain to improve function. Reduce swelling to improve joint ROM and reduce muscular inhibition.    Plan:  Progress exercises as tolerated to improve overall UE strength and performance in daily activities , Continued education of techniques such as heat to decrease joint stiffness and muscle tightness., Manual therapy to  muscle tightness and improve joint mobility. , and Modalities as needed to address symptoms.    Subjective:  Patient reports he \"hit his " "hand\" on a shelf and his pain levels have increased since incident.        Progress towards functional goal:  Reduce swelling and Reduce pain level  Pain Location: R hand  Pain (0-10): 6;   HEP adherence / understanding: compliance with the instructed home exercises.    Precautions:  Fall Risk: None    Precautions listed: none    Therapy diagnoses:   1. Hand pain, right  Follow Up In Occupational Therapy      2. Decreased range of motion of finger of right hand            Objective:  Measured 8/20/24, 9/30/24, 10/24/24, 11/21/24    Treatment Performed: (\"NP\" = Not Performed)     Therapeutic Exercise:NP    Therapeutic Activities: NP    Manual Therapy: 16 mins  - STM to dorsal and palmar aspect of hand and digits  - Light traction and stretching to all digits in all planes     Neuromuscular Re-Education: NP  -   Modalities: 25 min  - fluidotherapy with AROM 15 min  - US to volar aspect of hand (5 mins) and dorsal aspect of hand (5 mins)- 3Mhz, 1.0W/cm2, 10 min continuous     Education: Reviewed home exercise program.    "

## 2025-03-05 ENCOUNTER — TREATMENT (OUTPATIENT)
Dept: OCCUPATIONAL THERAPY | Facility: CLINIC | Age: 75
End: 2025-03-05
Payer: MEDICARE

## 2025-03-05 DIAGNOSIS — M25.641 DECREASED RANGE OF MOTION OF FINGER OF RIGHT HAND: ICD-10-CM

## 2025-03-05 DIAGNOSIS — M79.641 HAND PAIN, RIGHT: Primary | ICD-10-CM

## 2025-03-05 PROCEDURE — 97022 WHIRLPOOL THERAPY: CPT | Mod: GO | Performed by: OCCUPATIONAL THERAPIST

## 2025-03-05 PROCEDURE — 97140 MANUAL THERAPY 1/> REGIONS: CPT | Mod: GO | Performed by: OCCUPATIONAL THERAPIST

## 2025-03-05 PROCEDURE — 97035 APP MDLTY 1+ULTRASOUND EA 15: CPT | Mod: GO | Performed by: OCCUPATIONAL THERAPIST

## 2025-03-10 ENCOUNTER — TREATMENT (OUTPATIENT)
Dept: OCCUPATIONAL THERAPY | Facility: CLINIC | Age: 75
End: 2025-03-10
Payer: MEDICARE

## 2025-03-10 DIAGNOSIS — M79.641 HAND PAIN, RIGHT: Primary | ICD-10-CM

## 2025-03-10 DIAGNOSIS — M25.641 DECREASED RANGE OF MOTION OF FINGER OF RIGHT HAND: ICD-10-CM

## 2025-03-10 PROCEDURE — 97035 APP MDLTY 1+ULTRASOUND EA 15: CPT | Mod: GO | Performed by: OCCUPATIONAL THERAPIST

## 2025-03-10 PROCEDURE — 97022 WHIRLPOOL THERAPY: CPT | Mod: GO | Performed by: OCCUPATIONAL THERAPIST

## 2025-03-10 PROCEDURE — 97140 MANUAL THERAPY 1/> REGIONS: CPT | Mod: GO | Performed by: OCCUPATIONAL THERAPIST

## 2025-03-10 NOTE — PROGRESS NOTES
OCCUPATIONAL THERAPY TREATMENT NOTE    Patient Name:  Jeffry Lynn   Patient MRN: [id number]  Date: 3/10/2025  Time Calculation  Start Time: 1147  Stop Time: 1227  Time Calculation (min): 40 min    Insurance:  Visit number: 4 of 6   Insurance Type: Payor: [facility] MEDICARE / Plan: [employer] MEDICARE / Product Type: *No Product type* /   Authorization or Plan of Care date Range: AUTH FOR 6 V 2/10--   Copay:NA  Referred by: Jose Will DO        OT Therapeutic Procedures Time Entry  Manual Therapy Time Entry: 15  OT Modalities Time Entry  Ultrasound Time Entry: 10  Whirlpool Time Entry: 15                  General:  Reason for visit: right hand OA/pain  Referred by: Dr. Will    Current Problem:         Problem List Items Addressed This Visit             ICD-10-CM    Hand pain, right - Primary M79.641    Decreased range of motion of finger of right hand M25.641           Assessment:  Progress towards functional goals: Improved mobility in digits III and V  Response to interventions:  Increased composite fisting with decreased pain present after treatment session which included manual techniques and modalities.   Justification for continued skilled care: To address remaining functional, objective and subjective deficits to allow them to return to full independence with ADLs. Assess effectiveness of HEP. Modify and progress home exercise program. Reduce pain to improve function. Reduce swelling to improve joint ROM and reduce muscular inhibition.    Plan:  Progress exercises as tolerated to improve overall UE strength and performance in daily activities , Continued education of techniques such as heat to decrease joint stiffness and muscle tightness., Manual therapy to  muscle tightness and improve joint mobility. , and Modalities as needed to address symptoms.    Subjective:  Patient reports continued achiness and throbbing in the right hand with minimal changes  "his .  He is unable to perform minor house repairs as he does not have  or strength for tool use.       Progress towards functional goal:  Reduce swelling and Reduce pain level  Pain Location: R hand  Pain (0-10): 6;   HEP adherence / understanding: compliance with the instructed home exercises.    Precautions:  Fall Risk: None    Precautions listed: none    Therapy diagnoses:   1. Hand pain, right  Follow Up In Occupational Therapy      2. Decreased range of motion of finger of right hand            Objective:  Measured 8/20/24, 9/30/24, 10/24/24, 11/21/24    Treatment Performed: (\"NP\" = Not Performed)     Therapeutic Exercise:NP    Therapeutic Activities: NP    Manual Therapy: 15 mins  - STM to dorsal and palmar aspect of hand and digits  - Light traction and stretching to all digits in all planes     Neuromuscular Re-Education: NP  -   Modalities: 25 min  - fluidotherapy with AROM 15 min  - US to volar aspect of hand (5 mins) and dorsal aspect of hand (5 mins)- 3Mhz, 1.0W/cm2, 10 min continuous     Education: Reviewed home exercise program.    "

## 2025-03-13 ENCOUNTER — TREATMENT (OUTPATIENT)
Dept: OCCUPATIONAL THERAPY | Facility: CLINIC | Age: 75
End: 2025-03-13
Payer: MEDICARE

## 2025-03-13 DIAGNOSIS — M79.641 HAND PAIN, RIGHT: Primary | ICD-10-CM

## 2025-03-13 DIAGNOSIS — M25.641 DECREASED RANGE OF MOTION OF FINGER OF RIGHT HAND: ICD-10-CM

## 2025-03-13 PROCEDURE — 97140 MANUAL THERAPY 1/> REGIONS: CPT | Mod: GO | Performed by: OCCUPATIONAL THERAPIST

## 2025-03-13 PROCEDURE — 97035 APP MDLTY 1+ULTRASOUND EA 15: CPT | Mod: GO | Performed by: OCCUPATIONAL THERAPIST

## 2025-03-13 PROCEDURE — 97022 WHIRLPOOL THERAPY: CPT | Mod: GO | Performed by: OCCUPATIONAL THERAPIST

## 2025-03-13 NOTE — PROGRESS NOTES
OCCUPATIONAL THERAPY TREATMENT NOTE    Patient Name:  Jeffry Lynn   Patient MRN: [id number]  Date: 3/13/2025  Time Calculation  Start Time: 1002  Stop Time: 1042  Time Calculation (min): 40 min    Insurance:  Visit number: 5 of 6   Insurance Type: Payor: [facility] MEDICARE / Plan: [employer] MEDICARE / Product Type: *No Product type* /   Authorization or Plan of Care date Range: AUTH FOR 6 V 2/10--   Copay:NA  Referred by: Jose Will DO        OT Therapeutic Procedures Time Entry  Manual Therapy Time Entry: 15  OT Modalities Time Entry  Ultrasound Time Entry: 10  Whirlpool Time Entry: 15                  General:  Reason for visit: right hand OA/pain  Referred by: Dr. Will    Current Problem:         Problem List Items Addressed This Visit             ICD-10-CM    Hand pain, right - Primary M79.641    Decreased range of motion of finger of right hand M25.641           Assessment:  Progress towards functional goals: Improved mobility in digits III and V  Response to interventions:  Crepitus and nodule present in volar II digit MP region this visit with OT soft tissue mobilization.  Tenderness and discomfort present with OT manual techniques. Limitations in active and passive flexion of II digit persists.  Justification for continued skilled care: To address remaining functional, objective and subjective deficits to allow them to return to full independence with ADLs. Assess effectiveness of HEP. Modify and progress home exercise program. Reduce pain to improve function. Reduce swelling to improve joint ROM and reduce muscular inhibition.    Plan:  Progress exercises as tolerated to improve overall UE strength and performance in daily activities , Continued education of techniques such as heat to decrease joint stiffness and muscle tightness., Manual therapy to  muscle tightness and improve joint mobility. , and Modalities as needed to address  "symptoms.  Re-assessment NV    Subjective:  Patient reports slight improvement in pain and swelling today with pain levels reduced to a 4/10.       Progress towards functional goal:  Reduce swelling and Reduce pain level  Pain Location: R hand  Pain (0-10): 4;   HEP adherence / understanding: compliance with the instructed home exercises.    Precautions:  Fall Risk: None    Precautions listed: none    Therapy diagnoses:   1. Hand pain, right  Follow Up In Occupational Therapy      2. Decreased range of motion of finger of right hand            Objective:  Measured 8/20/24, 9/30/24, 10/24/24, 11/21/24    Treatment Performed: (\"NP\" = Not Performed)     Therapeutic Exercise:NP    Therapeutic Activities: NP    Manual Therapy: 15 mins  - STM to dorsal and palmar aspect of hand and digits  - Light traction and stretching to all digits in all planes     Neuromuscular Re-Education: NP  -   Modalities: 25 min  - fluidotherapy with AROM 15 min  - US to volar aspect of hand (5 mins) and dorsal aspect of hand (5 mins)- 3Mhz, 1.0W/cm2, 10 min continuous     Education: Reviewed home exercise program.    "

## 2025-03-17 NOTE — PROGRESS NOTES
OCCUPATIONAL THERAPY TREATMENT NOTE    Patient Name:  Jeffry Lynn   Patient MRN: [id number]  Date: 3/18/2025  Time Calculation  Start Time: 1046  Stop Time: 1126  Time Calculation (min): 40 min    Insurance:  Visit number: 6 of 6   Insurance Type: Payor: [employer] MEDICARE / Plan: [employer] MEDICARE / Product Type: *No Product type* /   Authorization or Plan of Care date Range: AUTH FOR 6 V 2/10-4-7/25   Copay:NA  Referred by: Jose Will DO        OT Therapeutic Procedures Time Entry  Therapeutic Exercise Time Entry: 15  OT Modalities Time Entry  Ultrasound Time Entry: 10  Whirlpool Time Entry: 15                  General:  Reason for visit: right hand OA/pain  Referred by: Dr. Will    Current Problem:         Problem List Items Addressed This Visit             ICD-10-CM    Hand pain, right - Primary M79.641    Decreased range of motion of finger of right hand M25.641           Assessment:  Progress towards functional goals: Improved mobility in digits III and V  Response to interventions:  Since evaluation, Jeffry has made good progress toward all established goals in strength, range of motion and functional outcomes.  Weakness and pain continue to limit functional use of the right hand for heavier household chores.  Jeffry would benefit from additional skilled OT 1x/week for 6 weeks to address remaining deficits and return to full pain free use of right hand.  Justification for continued skilled care: To address remaining functional, objective and subjective deficits to allow them to return to full independence with ADLs. Assess effectiveness of HEP. Modify and progress home exercise program. Reduce pain to improve function. Reduce swelling to improve joint ROM and reduce muscular inhibition.    Plan:  Progress exercises as tolerated to improve overall UE strength and performance in daily activities , Continued education of techniques such as heat to decrease joint  "stiffness and muscle tightness., Manual therapy to  muscle tightness and improve joint mobility. , and Modalities as needed to address symptoms.  Request additional OT visits- 1x/week for 6 weeks    Subjective:  Patient reports since evaluation of right hand his pain and swelling have both reduced. He continues to have weakness with tool use and with heavier household tasks which require assistance to complete.       Progress towards functional goal:  Reduce swelling and Reduce pain level  Pain Location: R hand  Pain (0-10): 4;   HEP adherence / understanding: compliance with the instructed home exercises.    Precautions:  Fall Risk: None    Precautions listed: none    Therapy diagnoses:   1. Hand pain, right  Follow Up In Occupational Therapy      2. Decreased range of motion of finger of right hand            Objective:  Measured 2/10/25  Active range of motion:  Right Wrist:  - Flexion: 55  - Extension: 70  - Rd dev: 18  - Ul dev: 30     II Digit:    SERVIN 233 (incr 9)    IV digit:  10/78  10/90  10/74   SERVIN 272 (incr 10)       Strength:  Right  47# with pain 4/10  Left  65#   Right 3pt pinch 8# with pain 6/10  Left 3pt pinch 12#     Outcome Measures:  OT Adult Other Outcome Measures  Other Outcome Measures: Quick Dash 30  (43.18%)     Treatment Performed: (\"NP\" = Not Performed)     Therapeutic Exercise: 15 MIN  -objective measures of AROM, strength and functional outcomes  Therapeutic Activities: NP    Manual Therapy: NP    Neuromuscular Re-Education: NP  -   Modalities: 25 min  - fluidotherapy with AROM 15 min  - US to volar aspect of hand (5 mins) and dorsal aspect of hand (5 mins)- 3Mhz, 1.0W/cm2, 10 min continuous     Education: Reviewed home exercise program.  Goals:  Patient will present with a pain score of 1/10 in right wrist and hand.   GOAL PROGRESSING 3/18/25  Patient to increase right wrist flexion AROM by 10 degrees in order to improve independent performance in daily " activities.  GOAL PROGRESSING 3/18/25     Patient to increase right hand digits to 250 SERVIN in order to improve independent performance in daily activities requiring a fist.  GOAL PROGRESSING 3/18/25     Patient will improve right  strength by 15lb to improve performance in lifting and grasping tasks.  GOAL PROGRESSING 3/18/25     Patient to improve QuickDASH score to at or below 20% to increase independency in ADLs and IADLs.  GOAL PROGRESSING 3/18/25     Patient will report understanding of home program, demonstrate independence and verbalize precautions. GOAL PROGRESSING 3/18/25

## 2025-03-18 ENCOUNTER — TREATMENT (OUTPATIENT)
Dept: OCCUPATIONAL THERAPY | Facility: CLINIC | Age: 75
End: 2025-03-18
Payer: MEDICARE

## 2025-03-18 DIAGNOSIS — M79.641 HAND PAIN, RIGHT: Primary | ICD-10-CM

## 2025-03-18 DIAGNOSIS — M25.641 DECREASED RANGE OF MOTION OF FINGER OF RIGHT HAND: ICD-10-CM

## 2025-03-18 PROCEDURE — 97035 APP MDLTY 1+ULTRASOUND EA 15: CPT | Mod: GO | Performed by: OCCUPATIONAL THERAPIST

## 2025-03-18 PROCEDURE — 97022 WHIRLPOOL THERAPY: CPT | Mod: GO | Performed by: OCCUPATIONAL THERAPIST

## 2025-03-18 PROCEDURE — 97110 THERAPEUTIC EXERCISES: CPT | Mod: GO | Performed by: OCCUPATIONAL THERAPIST

## 2025-03-25 ENCOUNTER — APPOINTMENT (OUTPATIENT)
Dept: PRIMARY CARE | Facility: CLINIC | Age: 75
End: 2025-03-25
Payer: MEDICARE

## 2025-03-25 VITALS
DIASTOLIC BLOOD PRESSURE: 73 MMHG | BODY MASS INDEX: 40.47 KG/M2 | OXYGEN SATURATION: 97 % | WEIGHT: 267 LBS | HEIGHT: 68 IN | HEART RATE: 70 BPM | TEMPERATURE: 97.1 F | SYSTOLIC BLOOD PRESSURE: 111 MMHG

## 2025-03-25 DIAGNOSIS — N18.32 CHRONIC KIDNEY DISEASE, STAGE 3B (MULTI): ICD-10-CM

## 2025-03-25 DIAGNOSIS — N52.9 ERECTILE DYSFUNCTION, UNSPECIFIED ERECTILE DYSFUNCTION TYPE: ICD-10-CM

## 2025-03-25 DIAGNOSIS — Z00.00 ROUTINE GENERAL MEDICAL EXAMINATION AT HEALTH CARE FACILITY: ICD-10-CM

## 2025-03-25 DIAGNOSIS — E11.69 TYPE 2 DIABETES MELLITUS WITH OTHER SPECIFIED COMPLICATION, WITH LONG-TERM CURRENT USE OF INSULIN: ICD-10-CM

## 2025-03-25 DIAGNOSIS — Z79.4 TYPE 2 DIABETES MELLITUS WITH OTHER SPECIFIED COMPLICATION, WITH LONG-TERM CURRENT USE OF INSULIN: ICD-10-CM

## 2025-03-25 DIAGNOSIS — Z71.89 ACP (ADVANCE CARE PLANNING): ICD-10-CM

## 2025-03-25 DIAGNOSIS — Z13.31 DEPRESSION SCREEN: ICD-10-CM

## 2025-03-25 DIAGNOSIS — Z00.00 ROUTINE GENERAL MEDICAL EXAMINATION AT A HEALTH CARE FACILITY: Primary | ICD-10-CM

## 2025-03-25 DIAGNOSIS — D64.9 ANEMIA, UNSPECIFIED TYPE: ICD-10-CM

## 2025-03-25 DIAGNOSIS — R51.9 ACUTE NONINTRACTABLE HEADACHE, UNSPECIFIED HEADACHE TYPE: ICD-10-CM

## 2025-03-25 PROBLEM — E66.01 CLASS 3 SEVERE OBESITY WITH BODY MASS INDEX (BMI) OF 40.0 TO 44.9 IN ADULT: Status: RESOLVED | Noted: 2023-02-15 | Resolved: 2025-03-25

## 2025-03-25 PROBLEM — J42 CHRONIC BRONCHITIS, UNSPECIFIED CHRONIC BRONCHITIS TYPE (MULTI): Status: RESOLVED | Noted: 2024-07-02 | Resolved: 2025-03-25

## 2025-03-25 PROBLEM — E66.813 CLASS 3 SEVERE OBESITY WITH BODY MASS INDEX (BMI) OF 40.0 TO 44.9 IN ADULT: Status: RESOLVED | Noted: 2023-02-15 | Resolved: 2025-03-25

## 2025-03-25 PROBLEM — L97.929 ULCER OF LEFT LOWER EXTREMITY: Status: RESOLVED | Noted: 2024-08-12 | Resolved: 2025-03-25

## 2025-03-25 PROCEDURE — 1036F TOBACCO NON-USER: CPT | Performed by: STUDENT IN AN ORGANIZED HEALTH CARE EDUCATION/TRAINING PROGRAM

## 2025-03-25 PROCEDURE — 99397 PER PM REEVAL EST PAT 65+ YR: CPT | Performed by: STUDENT IN AN ORGANIZED HEALTH CARE EDUCATION/TRAINING PROGRAM

## 2025-03-25 PROCEDURE — 1170F FXNL STATUS ASSESSED: CPT | Performed by: STUDENT IN AN ORGANIZED HEALTH CARE EDUCATION/TRAINING PROGRAM

## 2025-03-25 PROCEDURE — 3074F SYST BP LT 130 MM HG: CPT | Performed by: STUDENT IN AN ORGANIZED HEALTH CARE EDUCATION/TRAINING PROGRAM

## 2025-03-25 PROCEDURE — G0444 DEPRESSION SCREEN ANNUAL: HCPCS | Performed by: STUDENT IN AN ORGANIZED HEALTH CARE EDUCATION/TRAINING PROGRAM

## 2025-03-25 PROCEDURE — 1123F ACP DISCUSS/DSCN MKR DOCD: CPT | Performed by: STUDENT IN AN ORGANIZED HEALTH CARE EDUCATION/TRAINING PROGRAM

## 2025-03-25 PROCEDURE — 1158F ADVNC CARE PLAN TLK DOCD: CPT | Performed by: STUDENT IN AN ORGANIZED HEALTH CARE EDUCATION/TRAINING PROGRAM

## 2025-03-25 PROCEDURE — G0439 PPPS, SUBSEQ VISIT: HCPCS | Performed by: STUDENT IN AN ORGANIZED HEALTH CARE EDUCATION/TRAINING PROGRAM

## 2025-03-25 PROCEDURE — 3078F DIAST BP <80 MM HG: CPT | Performed by: STUDENT IN AN ORGANIZED HEALTH CARE EDUCATION/TRAINING PROGRAM

## 2025-03-25 PROCEDURE — 1159F MED LIST DOCD IN RCRD: CPT | Performed by: STUDENT IN AN ORGANIZED HEALTH CARE EDUCATION/TRAINING PROGRAM

## 2025-03-25 RX ORDER — SUMATRIPTAN SUCCINATE 50 MG/1
50 TABLET ORAL ONCE AS NEEDED
Qty: 27 TABLET | Refills: 3 | Status: SHIPPED | OUTPATIENT
Start: 2025-03-25 | End: 2026-03-25

## 2025-03-25 RX ORDER — SILDENAFIL 50 MG/1
50 TABLET, FILM COATED ORAL DAILY PRN
Qty: 12 TABLET | Refills: 3 | Status: SHIPPED | OUTPATIENT
Start: 2025-03-25 | End: 2026-03-25

## 2025-03-25 ASSESSMENT — ACTIVITIES OF DAILY LIVING (ADL)
BATHING: INDEPENDENT
DRESSING: INDEPENDENT
MANAGING_FINANCES: INDEPENDENT
DOING_HOUSEWORK: INDEPENDENT
GROCERY_SHOPPING: INDEPENDENT
TAKING_MEDICATION: INDEPENDENT

## 2025-03-25 ASSESSMENT — ENCOUNTER SYMPTOMS
DEPRESSION: 0
LOSS OF SENSATION IN FEET: 0
OCCASIONAL FEELINGS OF UNSTEADINESS: 0

## 2025-03-25 NOTE — PROGRESS NOTES
"Subjective   Reason for Visit: Jeffry Lynn is an 74 y.o. male here for a Medicare Wellness visit.          Reviewed all medications by prescribing practitioner or clinical pharmacist (such as prescriptions, OTCs, herbal therapies and supplements) and documented in the medical record.    HPI    DM: stable no issues continue meds    Ckd : stable no issues    Recent lost wife, active, going on trips    Patient Care Team:  Jose Will DO as PCP - General (Internal Medicine)  Corey Farris DO as Consulting Physician (Cardiology)     Review of Systems   All other systems reviewed and are negative.      Objective   Vitals:  /73 (BP Location: Left arm, Patient Position: Sitting)   Pulse 70   Temp 36.2 °C (97.1 °F)   Ht 1.727 m (5' 8\")   Wt 121 kg (267 lb)   SpO2 97%   BMI 40.60 kg/m²       Physical Exam  Constitutional:       Appearance: Normal appearance.   HENT:      Head: Normocephalic and atraumatic.      Right Ear: Tympanic membrane and ear canal normal.      Left Ear: Tympanic membrane and ear canal normal.      Mouth/Throat:      Mouth: Mucous membranes are moist.      Pharynx: Oropharynx is clear.   Eyes:      Extraocular Movements: Extraocular movements intact.      Conjunctiva/sclera: Conjunctivae normal.      Pupils: Pupils are equal, round, and reactive to light.   Cardiovascular:      Rate and Rhythm: Normal rate and regular rhythm.      Pulses: Normal pulses.      Heart sounds: Normal heart sounds.   Pulmonary:      Effort: Pulmonary effort is normal.      Breath sounds: Normal breath sounds.   Abdominal:      General: Abdomen is flat. Bowel sounds are normal.      Palpations: Abdomen is soft.   Musculoskeletal:         General: Normal range of motion.      Cervical back: Normal range of motion and neck supple.   Skin:     General: Skin is warm and dry.      Capillary Refill: Capillary refill takes 2 to 3 seconds.   Neurological:      General: No focal deficit present.      Mental Status: " He is alert and oriented to person, place, and time. Mental status is at baseline.   Psychiatric:         Mood and Affect: Mood normal.         Behavior: Behavior normal.         Thought Content: Thought content normal.         Judgment: Judgment normal.       Assessment & Plan  Routine general medical examination at a health care facility    Orders:  •  CBC and Auto Differential  •  Lipid Panel  •  Comprehensive Metabolic Panel  •  TSH with reflex to Free T4 if abnormal  •  Prostate Specific Antigen, Screen  •  Hemoglobin A1C    Depression screen         ACP (advance care planning)         Type 2 diabetes mellitus with other specified complication, with long-term current use of insulin         Chronic kidney disease, stage 3b (Multi)          1. Routine general medical examination at a health care facility (Primary)  Labs stable  refill    2. Depression screen  Denies    3. ACP (advance care planning)  Living will   Hcpoa  Full code    4. Type 2 diabetes mellitus with other specified complication, with long-term current use of insulin  st    5. Chronic kidney disease, stage 3b (Multi)  Stable check labs  Avoid nsaids    Tobacco/Alcohol/Opioid use, as well as Illicit Drug Use was screened for/reviewed and documented in Social Documentation section of the chart and medication list as appropriate    Depression Screening  Depression screening completed using the PHQ-2 questions, with results documented in the chart/encounter (~15min).  (See Rooming Screening section for documentation, or progress note for additional information)    Cardiac Risk Assessment  The ASCVD Risk score (Ivory DK, et al., 2019) failed to calculate for the following reasons:    The valid total cholesterol range is 130 to 320 mg/dL  Cardiovascular risk was discussed and, if needed, lifestyle modifications recommended, including nutritional choices, exercise, and elimination of habits contributing to risk. We agreed on a plan to reduce the current  cardiovascular risk based on above discussion as needed.     Aspirin use/disuse was discussed and documented in the Problem List of the medical record (under Cardiac Risk Counseling) after reviewing the updated guidelines below:  Consider low dose Aspirin ( mg) use if the benefit for cardiovascular disease prevention outweighs risk for bleeding complications.   In general, low dose ASA should be considered:  In patients WITHOUT prior MI/stroke/PAD (primary prevention):   a. Age <60: Use if 10-year cardiovascular disease risk >20%, with discussion of risks and benefits with patient  b. Age 60-<70: Use if 10-year cardiovascular disease risk >20% and low bleeding (e.g., gastrointestinal) risk, with discussion of risks and benefits with patient  c. Age >=70: Do not use    In patients WITH prior MI/stroke/PAD (secondary prevention):   Generally use unless extremely high bleeding (e.g., gastrointenstinal) risk, with discussion of risks and benefits with patient    Advance Directives Discussion  Advanced Care Planning (including a Living Will, Healthcare POA, as well as specific end of life choices and/or directives), was discussed with the patient and/or surrogate, voluntarily, and details of that discussion documented in the Problem List (under Advanced Directives Discussion) of the medical record.    (~16 min spent discussing above)     During the course of the visit the patient was educated and counseled about age appropriate screening and preventive services.   Completed preventive screenings were documented in the chart (see Routine Health Maintenance in Problem List) and orders were placed for outstanding screenings/procedures as documented in the Assessment and Plan.

## 2025-03-27 LAB
ALBUMIN SERPL-MCNC: 4.5 G/DL (ref 3.6–5.1)
ALP SERPL-CCNC: 100 U/L (ref 35–144)
ALT SERPL-CCNC: 14 U/L (ref 9–46)
ANION GAP SERPL CALCULATED.4IONS-SCNC: 12 MMOL/L (CALC) (ref 7–17)
AST SERPL-CCNC: 15 U/L (ref 10–35)
BASOPHILS # BLD AUTO: 62 CELLS/UL (ref 0–200)
BASOPHILS NFR BLD AUTO: 1 %
BILIRUB SERPL-MCNC: 1 MG/DL (ref 0.2–1.2)
BUN SERPL-MCNC: 26 MG/DL (ref 7–25)
CALCIUM SERPL-MCNC: 9.8 MG/DL (ref 8.6–10.3)
CHLORIDE SERPL-SCNC: 100 MMOL/L (ref 98–110)
CHOLEST SERPL-MCNC: 121 MG/DL
CHOLEST/HDLC SERPL: 3.4 (CALC)
CO2 SERPL-SCNC: 29 MMOL/L (ref 20–32)
CREAT SERPL-MCNC: 1.7 MG/DL (ref 0.7–1.28)
EGFRCR SERPLBLD CKD-EPI 2021: 42 ML/MIN/1.73M2
EOSINOPHIL # BLD AUTO: 136 CELLS/UL (ref 15–500)
EOSINOPHIL NFR BLD AUTO: 2.2 %
ERYTHROCYTE [DISTWIDTH] IN BLOOD BY AUTOMATED COUNT: 13.9 % (ref 11–15)
EST. AVERAGE GLUCOSE BLD GHB EST-MCNC: 180 MG/DL
EST. AVERAGE GLUCOSE BLD GHB EST-SCNC: 10 MMOL/L
GLUCOSE SERPL-MCNC: 155 MG/DL (ref 65–99)
HBA1C MFR BLD: 7.9 % OF TOTAL HGB
HCT VFR BLD AUTO: 41.9 % (ref 38.5–50)
HDLC SERPL-MCNC: 36 MG/DL
HGB BLD-MCNC: 13.6 G/DL (ref 13.2–17.1)
LDLC SERPL CALC-MCNC: 63 MG/DL (CALC)
LYMPHOCYTES # BLD AUTO: 1910 CELLS/UL (ref 850–3900)
LYMPHOCYTES NFR BLD AUTO: 30.8 %
MCH RBC QN AUTO: 29.4 PG (ref 27–33)
MCHC RBC AUTO-ENTMCNC: 32.5 G/DL (ref 32–36)
MCV RBC AUTO: 90.7 FL (ref 80–100)
MONOCYTES # BLD AUTO: 527 CELLS/UL (ref 200–950)
MONOCYTES NFR BLD AUTO: 8.5 %
NEUTROPHILS # BLD AUTO: 3565 CELLS/UL (ref 1500–7800)
NEUTROPHILS NFR BLD AUTO: 57.5 %
NONHDLC SERPL-MCNC: 85 MG/DL (CALC)
PLATELET # BLD AUTO: 257 THOUSAND/UL (ref 140–400)
PMV BLD REES-ECKER: 10.5 FL (ref 7.5–12.5)
POTASSIUM SERPL-SCNC: 3.6 MMOL/L (ref 3.5–5.3)
PROT SERPL-MCNC: 7.1 G/DL (ref 6.1–8.1)
PSA SERPL-MCNC: 1.75 NG/ML
RBC # BLD AUTO: 4.62 MILLION/UL (ref 4.2–5.8)
SODIUM SERPL-SCNC: 141 MMOL/L (ref 135–146)
TRIGL SERPL-MCNC: 138 MG/DL
TSH SERPL-ACNC: 2.95 MIU/L (ref 0.4–4.5)
WBC # BLD AUTO: 6.2 THOUSAND/UL (ref 3.8–10.8)

## 2025-03-28 NOTE — PROGRESS NOTES
OCCUPATIONAL THERAPY TREATMENT NOTE    Patient Name:  Jeffry Lynn   Patient MRN: [id number]  Date: 3/31/2025  Time Calculation  Start Time: 1145  Stop Time: 1225  Time Calculation (min): 40 min    Insurance:  Visit number: 7  12   Insurance Type: Payor: [facility] MEDICARE / Plan: [employer] MEDICARE / Product Type: *No Product type* /   Authorization or Plan of Care date Range: AUTH FOR 6 V 3/19/25-25   Copay:NA  Referred by: Jose Will DO        OT Therapeutic Procedures Time Entry  Manual Therapy Time Entry: 15  OT Modalities Time Entry  Ultrasound Time Entry: 10  Whirlpool Time Entry: 15                  General:  Reason for visit: right hand OA/pain  Referred by: Dr. Will    Current Problem:         Problem List Items Addressed This Visit             ICD-10-CM    Hand pain, right - Primary M79.641    Decreased range of motion of finger of right hand M25.641           Assessment:  Progress towards functional goals: Improved mobility in digits III and V  Response to interventions:  Improved passive flexion in IV digit this visit.  Slight tightness in the II digit MP joint, however, patient able to make a full composite fist after completion of treatment session today.  Justification for continued skilled care: To address remaining functional, objective and subjective deficits to allow them to return to full independence with ADLs. Assess effectiveness of HEP. Modify and progress home exercise program. Reduce pain to improve function. Reduce swelling to improve joint ROM and reduce muscular inhibition.    Plan:  Progress exercises as tolerated to improve overall UE strength and performance in daily activities , Continued education of techniques such as heat to decrease joint stiffness and muscle tightness., Manual therapy to  muscle tightness and improve joint mobility. , and Modalities as needed to address symptoms.    Subjective:  Patient reports his  "pain levels are at a 5/10 this morning with the rain and temperature change.       Progress towards functional goal:  Reduce swelling and Reduce pain level  Pain Location: R hand  Pain (0-10): 5;   HEP adherence / understanding: compliance with the instructed home exercises.    Precautions:  Fall Risk: None    Precautions listed: none    Therapy diagnoses:   1. Hand pain, right  Follow Up In Occupational Therapy      2. Decreased range of motion of finger of right hand            Objective:  Measured 2/10/25, 3/18/25     Treatment Performed: (\"NP\" = Not Performed)     Therapeutic Exercise: 15 MIN    Therapeutic Activities: NP    Manual Therapy: 15 min  -joint traction to all digits  - passive flexion and extension in all digits and composite fisting  Neuromuscular Re-Education: NP  -   Modalities: 25 min  - fluidotherapy with AROM 15 min  - US to volar aspect of hand (5 mins) and dorsal aspect of hand (5 mins)- 3Mhz, 1.0W/cm2, 10 min continuous     Education: Reviewed home exercise program.    "

## 2025-03-31 ENCOUNTER — TREATMENT (OUTPATIENT)
Dept: OCCUPATIONAL THERAPY | Facility: CLINIC | Age: 75
End: 2025-03-31
Payer: MEDICARE

## 2025-03-31 DIAGNOSIS — M79.641 HAND PAIN, RIGHT: Primary | ICD-10-CM

## 2025-03-31 DIAGNOSIS — M25.641 DECREASED RANGE OF MOTION OF FINGER OF RIGHT HAND: ICD-10-CM

## 2025-03-31 PROCEDURE — 97140 MANUAL THERAPY 1/> REGIONS: CPT | Mod: GO | Performed by: OCCUPATIONAL THERAPIST

## 2025-03-31 PROCEDURE — 97022 WHIRLPOOL THERAPY: CPT | Mod: GO | Performed by: OCCUPATIONAL THERAPIST

## 2025-03-31 PROCEDURE — 97035 APP MDLTY 1+ULTRASOUND EA 15: CPT | Mod: GO | Performed by: OCCUPATIONAL THERAPIST

## 2025-04-09 NOTE — PROGRESS NOTES
OCCUPATIONAL THERAPY TREATMENT NOTE    Patient Name:  Jeffry Lynn   Patient MRN: [id number]  Date: 4/10/2025  Time Calculation  Start Time: 832  Stop Time: 912  Time Calculation (min): 40 min    Insurance:  Visit number: 8 of 12   Insurance Type: Payor: [facility] MEDICARE / Plan: [employer] MEDICARE / Product Type: *No Product type* /   Authorization or Plan of Care date Range: AUTH FOR 6 V 3/19/25-25   Copay:NA  Referred by: Jose Will DO        OT Therapeutic Procedures Time Entry  Manual Therapy Time Entry: 15  OT Modalities Time Entry  Ultrasound Time Entry: 10  Whirlpool Time Entry: 15                  General:  Reason for visit: right hand OA/pain  Referred by: Dr. Will    Current Problem:         Problem List Items Addressed This Visit             ICD-10-CM    Hand pain, right - Primary M79.641    Decreased range of motion of finger of right hand M25.641           Assessment:  Progress towards functional goals: Improved mobility in digits III and V  Response to interventions:  Full passive flexion achieved in II and IV digits with OT stretches this visit.  Patient able to complete place and hold with no complaints of increased pain and full active fist achieved.  Justification for continued skilled care: To address remaining functional, objective and subjective deficits to allow them to return to full independence with ADLs. Assess effectiveness of HEP. Modify and progress home exercise program. Reduce pain to improve function. Reduce swelling to improve joint ROM and reduce muscular inhibition.    Plan:  Progress exercises as tolerated to improve overall UE strength and performance in daily activities , Continued education of techniques such as heat to decrease joint stiffness and muscle tightness., Manual therapy to  muscle tightness and improve joint mobility. , and Modalities as needed to address symptoms.    Subjective:  Patient reports over  "the past several days the swelling has reduced and he is able to make a full fist in the right hand easily.       Progress towards functional goal:  Reduce swelling and Reduce pain level  Pain Location: R hand  Pain (0-10): 5;   HEP adherence / understanding: compliance with the instructed home exercises.    Precautions:  Fall Risk: None    Precautions listed: none    Therapy diagnoses:   1. Hand pain, right  Follow Up In Occupational Therapy      2. Decreased range of motion of finger of right hand            Objective:  Measured 2/10/25, 3/18/25     Treatment Performed: (\"NP\" = Not Performed)     Therapeutic Exercise: 15 MIN    Therapeutic Activities: NP    Manual Therapy: 15 min  -joint traction to all digits  - passive flexion and extension in all digits and composite fisting  Neuromuscular Re-Education: NP  -   Modalities: 25 min  - fluidotherapy with AROM 15 min  - US to volar aspect of hand (5 mins) and dorsal aspect of hand (5 mins)- 3Mhz, 1.0W/cm2, 10 min continuous     Education: Reviewed home exercise program.    "

## 2025-04-10 ENCOUNTER — DOCUMENTATION (OUTPATIENT)
Dept: OCCUPATIONAL THERAPY | Facility: CLINIC | Age: 75
End: 2025-04-10
Payer: MEDICARE

## 2025-04-10 ENCOUNTER — TREATMENT (OUTPATIENT)
Dept: OCCUPATIONAL THERAPY | Facility: CLINIC | Age: 75
End: 2025-04-10
Payer: MEDICARE

## 2025-04-10 DIAGNOSIS — M79.641 HAND PAIN, RIGHT: Primary | ICD-10-CM

## 2025-04-10 DIAGNOSIS — M25.641 DECREASED RANGE OF MOTION OF FINGER OF RIGHT HAND: ICD-10-CM

## 2025-04-10 PROCEDURE — 97035 APP MDLTY 1+ULTRASOUND EA 15: CPT | Mod: GO | Performed by: OCCUPATIONAL THERAPIST

## 2025-04-10 PROCEDURE — 97140 MANUAL THERAPY 1/> REGIONS: CPT | Mod: GO | Performed by: OCCUPATIONAL THERAPIST

## 2025-04-10 PROCEDURE — 97022 WHIRLPOOL THERAPY: CPT | Mod: GO | Performed by: OCCUPATIONAL THERAPIST

## 2025-04-11 NOTE — PROGRESS NOTES
OCCUPATIONAL THERAPY TREATMENT NOTE    Patient Name:  Jeffry Lynn   Patient MRN: [id number]  Date: 4/14/2025  Time Calculation  Start Time: 1015  Stop Time: 1100  Time Calculation (min): 45 min    Insurance:  Visit number: 9 of 12   Insurance Type: Payor: [facility] MEDICARE / Plan: [employer] MEDICARE / Product Type: *No Product type* /   Authorization or Plan of Care date Range: AUTH FOR 6 V 3/19/25-5/14/25   Copay:NA  Referred by: Jose Will DO        OT Therapeutic Procedures Time Entry  Manual Therapy Time Entry: 20  OT Modalities Time Entry  Ultrasound Time Entry: 10  Whirlpool Time Entry: 15                  General:  Reason for visit: right hand OA/pain  Referred by: Dr. Will    Current Problem:         Problem List Items Addressed This Visit             ICD-10-CM    Hand pain, right - Primary M79.641    Decreased range of motion of finger of right hand M25.641           Assessment:  Progress towards functional goals: Improved mobility in digits III and V  Response to interventions:  Graston utilized to reduce swelling over digit II MCPJ. Therapist achieved full PROM in wrist and digits I and III-V. Decreased flexion and stiffness noted in digit II MCPJ. Patient notes decreased stiffness and achy pain at end of session. Will re-assess NV.   Justification for continued skilled care: To address remaining functional, objective and subjective deficits to allow them to return to full independence with ADLs. Assess effectiveness of HEP. Modify and progress home exercise program. Reduce pain to improve function. Reduce swelling to improve joint ROM and reduce muscular inhibition.    Plan:  Next appointment will be a reassessment.    Subjective:  Patient reports he woke up and his hand was achy and stiff. He is not sure what caused this stiffness. He played cards this weekend with no difficulties.     Progress towards functional goal:  Reduce swelling and Reduce pain  "level  Pain Location: R hand  Pain (0-10): 3;   HEP adherence / understanding: compliance with the instructed home exercises.    Precautions:  Fall Risk: None    Precautions listed: none    Therapy diagnoses:   1. Hand pain, right  Follow Up In Occupational Therapy      2. Decreased range of motion of finger of right hand            Objective:  Measured 2/10/25, 3/18/25     Treatment Performed: (\"NP\" = Not Performed)     Therapeutic Exercise: NP    Therapeutic Activities: NP    Manual Therapy: 20 min  -joint traction to all digits  - passive flexion and extension in all digits and composite fisting  - Graston to palm, thenar eminence, and digit II MCPJ  Neuromuscular Re-Education: NP  -   Modalities: 25 min  - fluidotherapy with AROM 15 min  - US to volar aspect of hand (5 mins) and dorsal aspect of hand (5 mins)- 3Mhz, 1.0W/cm2, 10 min continuous     Education: Reviewed home exercise program.    "

## 2025-04-14 ENCOUNTER — TREATMENT (OUTPATIENT)
Dept: OCCUPATIONAL THERAPY | Facility: CLINIC | Age: 75
End: 2025-04-14
Payer: MEDICARE

## 2025-04-14 DIAGNOSIS — M25.641 DECREASED RANGE OF MOTION OF FINGER OF RIGHT HAND: ICD-10-CM

## 2025-04-14 DIAGNOSIS — M79.641 HAND PAIN, RIGHT: Primary | ICD-10-CM

## 2025-04-14 PROCEDURE — 97022 WHIRLPOOL THERAPY: CPT | Mod: GO

## 2025-04-14 PROCEDURE — 97140 MANUAL THERAPY 1/> REGIONS: CPT | Mod: GO

## 2025-04-14 PROCEDURE — 97035 APP MDLTY 1+ULTRASOUND EA 15: CPT | Mod: GO

## 2025-04-18 NOTE — PROGRESS NOTES
OCCUPATIONAL THERAPY TREATMENT NOTE    Patient Name:  Jeffry Lynn   Patient MRN: [id number]  Date: 4/21/2025  Time Calculation  Start Time: 0851  Stop Time: 0931  Time Calculation (min): 40 min    Insurance:  Visit number: 10 of 12   Insurance Type: Payor: [facility] MEDICARE / Plan: [employer] MEDICARE / Product Type: *No Product type* /   Authorization or Plan of Care date Range: AUTH FOR 6 V 3/19/25-5/14/25   Copay:NA  Referred by: Jose Will DO OT Therapeutic Procedures Time Entry  Manual Therapy Time Entry: 5  Therapeutic Exercise Time Entry: 10  OT Modalities Time Entry  Ultrasound Time Entry: 10  Whirlpool Time Entry: 15                  General:  Reason for visit: right hand OA/pain  Referred by: Dr. Will    Current Problem:         Problem List Items Addressed This Visit           ICD-10-CM    Hand pain, right - Primary M79.641    Decreased range of motion of finger of right hand M25.641     Assessment:  Progress towards functional goals: Improved mobility in digits III and V and Improve  and pinch strength  Response to interventions:  Objective measurements were re-assessed indicating reduced AROM in digit II. Reduced wrist extension noted. Increased flexion noted in digit IV. Improved R /pinch strength. Improved QuickDASH score indicates increased participation in ADLs and IADLs. Patient will be placed on hold, as he will be out of town for 2-3 weeks. Patient to call and reschedule if the achy pain in the R hand persists. Therapist updated HEP to include forearm stretches to improve wrist ROM. Patient verbalizes full understanding of new exercises.    Justification for continued skilled care: To address remaining functional, objective and subjective deficits to allow them to return to full independence with ADLs. Assess effectiveness of HEP. Modify and progress home exercise program. Reduce pain to improve function. Reduce swelling to improve  "joint ROM and reduce muscular inhibition.    Plan:  Patient will be placed on hold for 3 weeks or until patient calls to schedule follow up. Will place auth for more visits if patient calls to reschedule.     Subjective:  Patient reports his R index finger is swollen in the palm and aches.      Progress towards functional goal:  Reduce swelling and Reduce pain level  Pain Location: R hand  Pain (0-10): 3;   HEP adherence / understanding: compliance with the instructed home exercises.    Precautions:  Fall Risk: None    Precautions listed: none    Therapy diagnoses:   1. Hand pain, right  Follow Up In Occupational Therapy      2. Decreased range of motion of finger of right hand            Objective:  Measured 2/10/25, 3/18/25, 4/21/25  Active range of motion:  Right Wrist:  - Flexion: 55 (no change)   - Extension: 55 (-15)  - Rd dev: 18 (no change)  - Ul dev: 30 (no change)     II Digit:  -12/65   0/88    0/60  SERVIN 201 (decline 32)     IV digit:  0/72  0/90  0/72   SERVIN 234 (decline 38)        Strength:  Right  65# (+18#)   Left  54# (-11#)   Right 3pt pinch 12# (+4#) with pain 4/10 in digit II  Left 3pt pinch 13# (+1#)      Outcome Measures:  OT Adult Other Outcome Measures  Other Outcome Measures: Quick Dash 23 (7 point improvement)  (27.27%)     Treatment Performed: (\"NP\" = Not Performed)     Therapeutic Exercise: 10 mins  - Re-assessed objective measurements  - Discussed being placed on hold   - Reviewed HEP exercises; Updated HEP to include forearm stretches    Therapeutic Activities: NP    Manual Therapy: 5 min  - passive flexion and extension in digits V and wrist     Neuromuscular Re-Education: NP  -   Modalities: 25 min  - fluidotherapy with AROM 15 min  - US to volar aspect of hand (5 mins) and dorsal aspect of hand (5 mins)- 3Mhz, 1.0W/cm2, 10 min continuous     Education: Added forearm stretches exercise(s) to HEP program Access Code [email or web address]   "

## 2025-04-21 ENCOUNTER — TREATMENT (OUTPATIENT)
Dept: OCCUPATIONAL THERAPY | Facility: CLINIC | Age: 75
End: 2025-04-21
Payer: MEDICARE

## 2025-04-21 DIAGNOSIS — M25.641 DECREASED RANGE OF MOTION OF FINGER OF RIGHT HAND: ICD-10-CM

## 2025-04-21 DIAGNOSIS — M79.641 HAND PAIN, RIGHT: Primary | ICD-10-CM

## 2025-04-21 PROCEDURE — 97110 THERAPEUTIC EXERCISES: CPT | Mod: GO

## 2025-04-21 PROCEDURE — 97035 APP MDLTY 1+ULTRASOUND EA 15: CPT | Mod: GO

## 2025-04-21 PROCEDURE — 97022 WHIRLPOOL THERAPY: CPT | Mod: GO

## 2025-05-05 DIAGNOSIS — E08.59 DIABETES MELLITUS DUE TO UNDERLYING CONDITION WITH OTHER CIRCULATORY COMPLICATIONS: ICD-10-CM

## 2025-05-06 RX ORDER — GLIMEPIRIDE 2 MG/1
2 TABLET ORAL
Qty: 90 TABLET | Refills: 3 | Status: SHIPPED | OUTPATIENT
Start: 2025-05-06

## 2025-05-20 ENCOUNTER — APPOINTMENT (OUTPATIENT)
Dept: OCCUPATIONAL THERAPY | Facility: CLINIC | Age: 75
End: 2025-05-20
Payer: MEDICARE

## 2025-05-20 DIAGNOSIS — M25.641 DECREASED RANGE OF MOTION OF FINGER OF RIGHT HAND: ICD-10-CM

## 2025-05-20 DIAGNOSIS — M79.641 HAND PAIN, RIGHT: Primary | ICD-10-CM

## 2025-05-27 ENCOUNTER — APPOINTMENT (OUTPATIENT)
Dept: OCCUPATIONAL THERAPY | Facility: CLINIC | Age: 75
End: 2025-05-27
Payer: MEDICARE

## 2025-05-27 DIAGNOSIS — M25.641 DECREASED RANGE OF MOTION OF FINGER OF RIGHT HAND: ICD-10-CM

## 2025-05-27 DIAGNOSIS — M79.641 HAND PAIN, RIGHT: Primary | ICD-10-CM

## 2025-06-12 ENCOUNTER — APPOINTMENT (OUTPATIENT)
Dept: CARDIOLOGY | Facility: CLINIC | Age: 75
End: 2025-06-12
Payer: MEDICARE

## 2025-06-15 DIAGNOSIS — I25.10 CORONARY ARTERY CALCIFICATION SEEN ON CAT SCAN: ICD-10-CM

## 2025-06-15 DIAGNOSIS — E78.00 PURE HYPERCHOLESTEROLEMIA: ICD-10-CM

## 2025-06-16 RX ORDER — ATORVASTATIN CALCIUM 40 MG/1
40 TABLET, FILM COATED ORAL DAILY
COMMUNITY
Start: 2025-06-16

## 2025-06-25 ENCOUNTER — OFFICE VISIT (OUTPATIENT)
Dept: CARDIOLOGY | Facility: CLINIC | Age: 75
End: 2025-06-25
Payer: MEDICARE

## 2025-06-25 VITALS
DIASTOLIC BLOOD PRESSURE: 60 MMHG | BODY MASS INDEX: 41.36 KG/M2 | WEIGHT: 272 LBS | OXYGEN SATURATION: 97 % | HEART RATE: 69 BPM | SYSTOLIC BLOOD PRESSURE: 122 MMHG

## 2025-06-25 DIAGNOSIS — I10 ESSENTIAL HYPERTENSION: ICD-10-CM

## 2025-06-25 DIAGNOSIS — R60.0 LEG EDEMA: ICD-10-CM

## 2025-06-25 DIAGNOSIS — E78.00 PURE HYPERCHOLESTEROLEMIA: ICD-10-CM

## 2025-06-25 DIAGNOSIS — G47.33 OBSTRUCTIVE SLEEP APNEA SYNDROME: ICD-10-CM

## 2025-06-25 DIAGNOSIS — I25.10 CORONARY ARTERY CALCIFICATION SEEN ON CAT SCAN: Primary | ICD-10-CM

## 2025-06-25 DIAGNOSIS — N18.32 CHRONIC KIDNEY DISEASE, STAGE 3B (MULTI): ICD-10-CM

## 2025-06-25 LAB
ATRIAL RATE: 69 BPM
P AXIS: 71 DEGREES
P OFFSET: 180 MS
P ONSET: 117 MS
PR INTERVAL: 212 MS
Q ONSET: 223 MS
QRS COUNT: 11 BEATS
QRS DURATION: 100 MS
QT INTERVAL: 416 MS
QTC CALCULATION(BAZETT): 445 MS
QTC FREDERICIA: 436 MS
R AXIS: 19 DEGREES
T AXIS: 30 DEGREES
T OFFSET: 431 MS
VENTRICULAR RATE: 69 BPM

## 2025-06-25 PROCEDURE — G2211 COMPLEX E/M VISIT ADD ON: HCPCS | Performed by: INTERNAL MEDICINE

## 2025-06-25 PROCEDURE — 99212 OFFICE O/P EST SF 10 MIN: CPT

## 2025-06-25 PROCEDURE — 3074F SYST BP LT 130 MM HG: CPT | Performed by: INTERNAL MEDICINE

## 2025-06-25 PROCEDURE — 99213 OFFICE O/P EST LOW 20 MIN: CPT | Performed by: INTERNAL MEDICINE

## 2025-06-25 PROCEDURE — 3078F DIAST BP <80 MM HG: CPT | Performed by: INTERNAL MEDICINE

## 2025-06-25 PROCEDURE — 1036F TOBACCO NON-USER: CPT | Performed by: INTERNAL MEDICINE

## 2025-06-25 PROCEDURE — 93005 ELECTROCARDIOGRAM TRACING: CPT | Performed by: INTERNAL MEDICINE

## 2025-06-25 PROCEDURE — 1159F MED LIST DOCD IN RCRD: CPT | Performed by: INTERNAL MEDICINE

## 2025-06-25 NOTE — PROGRESS NOTES
Chief Complaint:   6 month follow up  and Hypertension     History Of Present Illness:    Jeffry Lynn is a 74 y.o. male presenting with CV DZ.  Occasional swelling L foot-resolves with elevation    Patient denies CP/ SOB/palpitations/dizziness/lightheadedness/edema/claudication    No regular exercise but more active     Last Recorded Vitals:  Vitals:    06/25/25 1151 06/25/25 1207   BP: 124/50 122/60   BP Location: Left arm    Patient Position: Sitting    BP Cuff Size: Large adult    Pulse: 69    SpO2: 97%    Weight: 123 kg (272 lb)             Allergies:  Patient has no known allergies.    Outpatient Medications:  Current Outpatient Medications   Medication Instructions    aspirin 81 mg EC tablet 1 tablet, Daily    atorvastatin (LIPITOR) 40 mg, oral, Daily    blood sugar diagnostic (OneTouch Verio test strips) strip TEST TWICE DAILY AS NEEDED    dulaglutide (TRULICITY) 1.5 mg, subcutaneous, Once Weekly    esomeprazole (NEXIUM) 20 mg, Daily before breakfast    fluticasone (Flonase) 50 mcg/actuation nasal spray 1 spray, Each Nostril, Daily    furosemide (LASIX) 20 mg, oral, Daily    gabapentin (NEURONTIN) 600 mg, oral, 4 times daily    glimepiride (AMARYL) 2 mg, oral, Daily before breakfast    hydroCHLOROthiazide (HYDRODIURIL) 25 mg, oral, Daily    loratadine (Claritin) 10 mg tablet 1 tablet, Daily    metFORMIN XR (GLUCOPHAGE-XR) 500 mg, 2 times daily (morning and late afternoon)    metoprolol succinate XL (TOPROL-XL) 50 mg, oral, Daily    multivitamin-iron-folic acid (Centrum)  mg-mcg tablet tablet 1 tablet, Daily    OneTouch Delica Plus Lancet 33 gauge misc TEST TWICE DAILY AND AS NEEDED    OneTouch Verio Flex meter misc use as directed.    pioglitazone (Actos) 45 mg tablet TAKE 1 TABLET BY MOUTH ONCE  DAILY    rizatriptan (Maxalt) 10 mg tablet     sildenafil (VIAGRA) 50 mg, oral, Daily PRN    SUMAtriptan (IMITREX) 50 mg, oral, Once as needed, May repeat after 2 hours.    tamsulosin (Flomax) 0.4 mg 24 hr  capsule     topiramate (TOPAMAX) 100 mg, oral, 2 times daily       Physical Exam:  Constitutional:       Appearance: Healthy appearance. Not in distress. Obese.   Neck:      Vascular: No JVR. JVD normal.   Pulmonary:      Effort: Pulmonary effort is normal.      Breath sounds: Normal breath sounds. No wheezing. No rhonchi. No rales.   Chest:      Chest wall: Not tender to palpatation.   Cardiovascular:      PMI at left midclavicular line. Normal rate. Regular rhythm. Normal S1. Normal S2.       Murmurs: There is no murmur.      No gallop.  No click. No rub.   Pulses:     Intact distal pulses.   Edema:     Peripheral edema absent.   Abdominal:      General: Abdomen is protuberant. Bowel sounds are normal.      Palpations: Abdomen is soft.      Tenderness: There is no abdominal tenderness.   Musculoskeletal: Normal range of motion.         General: No tenderness. Skin:     General: Skin is warm and dry.   Neurological:      General: No focal deficit present.      Mental Status: Alert and oriented to person, place and time.          Last Labs:  CBC -  Lab Results   Component Value Date    WBC 6.2 03/27/2025    HGB 13.6 03/27/2025    HCT 41.9 03/27/2025    MCV 90.7 03/27/2025     03/27/2025       CMP -  Lab Results   Component Value Date    CALCIUM 9.8 03/27/2025    PHOS 2.9 12/21/2024    PROT 7.1 03/27/2025    ALBUMIN 4.5 03/27/2025    AST 15 03/27/2025    ALT 14 03/27/2025    ALKPHOS 100 03/27/2025    BILITOT 1.0 03/27/2025       LIPID PANEL -   Lab Results   Component Value Date    CHOL 121 03/27/2025    TRIG 138 03/27/2025    HDL 36 (L) 03/27/2025    CHHDL 3.4 03/27/2025    LDLF 74 03/02/2022    VLDL 29 08/19/2024    NHDL 85 03/27/2025       Ldl=63    RENAL FUNCTION PANEL -   Lab Results   Component Value Date    GLUCOSE 155 (H) 03/27/2025     03/27/2025    K 3.6 03/27/2025     03/27/2025    CO2 29 03/27/2025    ANIONGAP 12 03/27/2025    BUN 26 (H) 03/27/2025    CREATININE 1.70 (H) 03/27/2025     GFRMALE 35 (A) 08/24/2022    CALCIUM 9.8 03/27/2025    PHOS 2.9 12/21/2024    ALBUMIN 4.5 03/27/2025       Lab Results   Component Value Date    BNP 23 04/27/2019    HGBA1C 7.9 (H) 03/27/2025    HGBA1C 6.6 (A) 08/10/2023           Lab review: I have personally reviewed the laboratory result(s)       Problem List Items Addressed This Visit       Chronic kidney disease, stage 3b (Multi)    Overview   Cr=1.43 1/2020   Cr=1.56 3/2022 - recheck BMP   Cr=1.99 8/2022 - decreased olmesartan and olmesartan then stopped  Cr=1.5   Cr=1.7 3/2025  Seeing Nephrology          Coronary artery calcification seen on CAT scan - Primary    Overview   Extensive 4V cor calcification per 11/2020 chest CT   Pt with no definite angina/ischemic EKG changes  12/2023 John nuc stress test NL  On ASA / statin / beta blocker   Follow         Essential hypertension    Overview   SBP stable in office-generally OK at home  No ACE/ARB or increase in hydrochlorothiazide as has renal insuff  Follow         Relevant Orders    ECG 12 lead (Clinic Performed)    Obstructive sleep apnea syndrome    Overview   Moderate - wearing CPAP intermittently         Leg edema    Overview   Suspect more peripheral than central - 1/2021 echo with Nl LVEF   Clinically better- watch salt intake / elevate legs   Encourage use of compression socks   No increase in diuretic as Cr increased         Hyperlipidemia    Overview   Warrants HI statin with DM / HTN / CAC - on atorvastatin   3/2025 LDL=63          Weight loss  Stay active        Corey Farris,

## 2025-07-15 ENCOUNTER — OFFICE VISIT (OUTPATIENT)
Dept: SLEEP MEDICINE | Facility: CLINIC | Age: 75
End: 2025-07-15
Payer: MEDICARE

## 2025-07-15 VITALS
SYSTOLIC BLOOD PRESSURE: 135 MMHG | HEIGHT: 68 IN | WEIGHT: 276 LBS | HEART RATE: 61 BPM | BODY MASS INDEX: 41.83 KG/M2 | TEMPERATURE: 98 F | DIASTOLIC BLOOD PRESSURE: 71 MMHG | OXYGEN SATURATION: 97 %

## 2025-07-15 DIAGNOSIS — G47.33 OBSTRUCTIVE SLEEP APNEA SYNDROME: Primary | ICD-10-CM

## 2025-07-15 PROCEDURE — 99213 OFFICE O/P EST LOW 20 MIN: CPT | Performed by: PHYSICIAN ASSISTANT

## 2025-07-15 PROCEDURE — 3075F SYST BP GE 130 - 139MM HG: CPT | Performed by: PHYSICIAN ASSISTANT

## 2025-07-15 PROCEDURE — 1159F MED LIST DOCD IN RCRD: CPT | Performed by: PHYSICIAN ASSISTANT

## 2025-07-15 PROCEDURE — 1125F AMNT PAIN NOTED PAIN PRSNT: CPT | Performed by: PHYSICIAN ASSISTANT

## 2025-07-15 PROCEDURE — 1036F TOBACCO NON-USER: CPT | Performed by: PHYSICIAN ASSISTANT

## 2025-07-15 PROCEDURE — G2211 COMPLEX E/M VISIT ADD ON: HCPCS | Performed by: PHYSICIAN ASSISTANT

## 2025-07-15 PROCEDURE — 3078F DIAST BP <80 MM HG: CPT | Performed by: PHYSICIAN ASSISTANT

## 2025-07-15 ASSESSMENT — PAIN SCALES - GENERAL: PAINLEVEL_OUTOF10: 3

## 2025-07-15 ASSESSMENT — ENCOUNTER SYMPTOMS
DEPRESSION: 0
OCCASIONAL FEELINGS OF UNSTEADINESS: 0
LOSS OF SENSATION IN FEET: 0

## 2025-07-15 ASSESSMENT — LIFESTYLE VARIABLES
HOW OFTEN DO YOU HAVE SIX OR MORE DRINKS ON ONE OCCASION: NEVER
HOW MANY STANDARD DRINKS CONTAINING ALCOHOL DO YOU HAVE ON A TYPICAL DAY: 1 OR 2
HOW OFTEN DO YOU HAVE A DRINK CONTAINING ALCOHOL: 2-4 TIMES A MONTH
SKIP TO QUESTIONS 9-10: 1
AUDIT-C TOTAL SCORE: 2

## 2025-07-15 NOTE — PROGRESS NOTES
Patient: Clifford Lynn    [id number]  : 1950 -- AGE 74 y.o.    Provider: Anamika Chowdhury PA-C     Location [facility]   Service Date: 7/15/2025              [facility]  Followup Visit Note    HISTORY OF PRESENT ILLNESS     HISTORY OF PRESENT ILLNESS   Clifford Lynn is a 74 y.o. male with h/o sleep apnea, htn, multiple comorbidities  who presents to a [facility] for followup.     PAST SLEEP HISTORY     CLIFFORD has had a home sleep study in the past completed on 2020. The results were as follows:    AHI: 29 (59 central, 48 Obs, 181 hypopnea)   O2 Handy: 76%       Assessment and plan from last visit: 2025-   Essential hypertension I10        -elevated today  -has not yet taken his BP medication, will take it when he gets home with breakfast  -follow up closely with PCP            Obstructive sleep apnea syndrome - Primary G47.33       -was out of town around holidays; back to regular use  -rAhI controlled, experiences benefit  -update supply order  -avoid drowsy driving            Relevant Orders     Positive Airway Pressure (PAP) Therapy     Other insomnia G47.09       -due to some grief from loss of wife last summer  -reviewed insomnia tips  -not interested in any sleep aid            Elevated blood pressure reading R03.0       -asymptomatic  -close follow up with pcp  -encourage regular cpap use            Current History    On today's visit, the patient reports he is back to using cpap regularly. Took him some time to get back into the habit of it after his wife passed away last summer. Does feel sleep more consolidated with cpap and he is sleeping longer stretches, feels more rested with it as well. No snoring/pauses in breathing as he had before treatment to his knowledge. He is using N30i mask. Denies any issues getting drowsy at the wheel. Reports he has always been a shorter sleeper, but denies any  excessive daytime sleepiness. Estimates he is getting 5-6 hours of sleep most nights.     Sleep schedule:   Bed time: MN   Sleep latency: watching TV/movie -varies   Nocturnal Awakenings: usually none - back to sleep quickly if he does get up   Wake up: 6:30/7A   TST: 5-6 hours   Naps: rare at this time     ESS:  7  KEISHA:  16  FOSQ: 34    REVIEW OF SYSTEMS     REVIEW OF SYSTEMS  See HPI; all other ROS were reviewed and negative for compliant      ALLERGIES AND MEDICATIONS     ALLERGIES  Allergies[1]    MEDICATIONS: He has a current medication list which includes the following prescription(s): aspirin - Take 1 tablet (81 mg) by mouth once daily, atorvastatin - Take 1 tablet (40 mg) by mouth once daily. Do not fill before November 14, 2024, onetouch verio test strips - TEST TWICE DAILY AS NEEDED, dulaglutide - Inject 1.5 mg under the skin 1 (one) time per week, esomeprazole - Take 1 capsule (20 mg) by mouth once daily in the morning. Take before meals. Do not open capsule, fluticasone - ADMINISTER 1 SPRAY INTO EACH NOSTRIL ONCE DAILY, furosemide - Take 1 tablet (20 mg) by mouth once daily, gabapentin - TAKE 1 TABLET BY MOUTH 4 TIMES  DAILY, glimepiride - TAKE 1 TABLET BY MOUTH ONCE  DAILY IN THE MORNING BEFORE A  MEAL, hydrochlorothiazide - Take 1 tablet (25 mg) by mouth once daily. Do not fill before January 29, 2025, loratadine - Take 1 tablet (10 mg) by mouth once daily, metformin xr - Take 1 tablet (500 mg) by mouth 2 times daily (morning and late afternoon). Do not crush, chew, or split, metoprolol succinate xl - Take 1 tablet (50 mg) by mouth once daily. Do not fill before November 4, 2024, centrum - Take 1 tablet by mouth once daily, onetouch delica plus lancet - TEST TWICE DAILY AND AS NEEDED, onetouch verio flex meter - use as directed, pioglitazone - TAKE 1 TABLET BY MOUTH ONCE  DAILY, rizatriptan, sildenafil - Take 1 tablet (50 mg) by mouth once daily as needed for erectile dysfunction, sumatriptan - Take 1  tablet (50 mg) by mouth 1 time if needed for migraine. May repeat after 2 hours, tamsulosin, and topiramate - TAKE 1 TABLET BY MOUTH TWICE A DAY, and the following Facility-Administered Medications: tc-99m tetrofosmin and tc-99m tetrofosmin.    PAST MEDICAL HISTORY : He  has a past medical history of ANSELMO (acute kidney injury), Apnea, sleep, Arthritis, BPH (benign prostatic hyperplasia), CAD (coronary artery disease), Cholestasis, CKD (chronic kidney disease), stage III (Multi), Coronary artery calcification seen on CAT scan, Diabetic neuropathy (Multi), Dyslipidemia, Essential familial hypercholesterolemia, Essential hypertension, GERD (gastroesophageal reflux disease), History of acute prostatitis, History of deep venous thrombosis, History of melena, Hyperesthesia, Hypotension, Hypothyroidism, Knee arthropathy, Left shoulder tendinitis, Lumbar canal stenosis, Migraine with aura, not intractable, without status migrainosus, Multiple lung nodules, Obesity, morbid, BMI 40.0-49.9 (Multi), Obstructive sleep apnea, Paresthesia, Personal history of gout, Post-nasal drip, Rickets, active, Sialoadenitis of submandibular gland, Snores, SOBOE (shortness of breath on exertion), Spondylolisthesis of lumbar region, Type 2 diabetes mellitus, Unilateral primary osteoarthritis, left knee, Urine incontinence, and Vitamin D deficiency.    PAST SURGICAL HISTORY: He  has a past surgical history that includes Lumbar fusion; Knee surgery; Umbilical hernia repair; Tonsillectomy (1950); Colonoscopy; and Carpal tunnel release.     FAMILY HISTORY: No changes since previous visit. Otherwise non-contributory as charted.     SOCIAL HISTORY  He  reports that he quit smoking about 47 years ago. His smoking use included cigarettes. He has never used smokeless tobacco. He reports current alcohol use. He reports that he does not use drugs.       PHYSICAL EXAM     VITAL SIGNS: /71 (BP Location: Right arm, Patient Position: Sitting, BP Cuff  "Size: Adult)   Pulse 61   Temp 36.7 °C (98 °F)   Ht 1.727 m (5' 8\")   Wt 125 kg (276 lb)   SpO2 97%   BMI 41.97 kg/m²        PREVIOUS WEIGHTS:  Wt Readings from Last 3 Encounters:   07/15/25 125 kg (276 lb)   06/25/25 123 kg (272 lb)   03/25/25 121 kg (267 lb)       Constitutional: Alert and oriented, cooperative, no acute distress  Head: Normocephalic, atraumatic   Cranial Features: No abnormal craniofacial features  Neck: Supple. Trachea midline.  Pulmonary: Non-labored breathing, speaks in full sentences. No cough.    Cardiac: regular rate   Extremities: No clubbing, no edema  Neuromuscular: Cranial nerves grossly intact, no focal deficits      RESULTS/DATA     CARBON DIOXIDE (mmol/L)   Date Value   03/27/2025 29     Bicarbonate (mmol/L)   Date Value   12/21/2024 28   12/09/2024 26   08/19/2024 30       PAP Adherence      ASSESSMENT/PLAN     Mr. Lynn is a 74 y.o. male and he returns in followup to the [facility] for LORENA.    Problem List, Orders, Assessment, Recommendations:  Problem List Items Addressed This Visit           ICD-10-CM    Obstructive sleep apnea syndrome - Primary G47.33    -meeting compliance, experiencing good benefit from pap therapy  -update supply order with [facility]  -avoid drowsy driving            Relevant Orders    Positive Airway Pressure (PAP) Therapy       Disposition    Return to clinic in 12 months                [1] No Known Allergies    "

## 2025-08-14 DIAGNOSIS — G47.33 OBSTRUCTIVE SLEEP APNEA SYNDROME: ICD-10-CM

## 2025-08-14 DIAGNOSIS — J34.9 SINUS PROBLEM: ICD-10-CM

## 2025-08-14 RX ORDER — FLUTICASONE PROPIONATE 50 MCG
1 SPRAY, SUSPENSION (ML) NASAL DAILY
Qty: 16 ML | Refills: 1 | Status: SHIPPED | OUTPATIENT
Start: 2025-08-14

## 2025-08-27 DIAGNOSIS — I25.10 CORONARY ARTERY CALCIFICATION SEEN ON CAT SCAN: ICD-10-CM

## 2025-08-27 DIAGNOSIS — I10 ESSENTIAL HYPERTENSION: ICD-10-CM

## 2025-08-28 RX ORDER — METOPROLOL SUCCINATE 50 MG/1
50 TABLET, EXTENDED RELEASE ORAL DAILY
COMMUNITY
Start: 2025-08-28